# Patient Record
Sex: FEMALE | Race: OTHER | HISPANIC OR LATINO | Employment: FULL TIME | URBAN - METROPOLITAN AREA
[De-identification: names, ages, dates, MRNs, and addresses within clinical notes are randomized per-mention and may not be internally consistent; named-entity substitution may affect disease eponyms.]

---

## 2023-01-11 PROBLEM — J33.9 NASAL POLYP: Status: ACTIVE | Noted: 2023-01-11

## 2023-01-11 PROBLEM — J30.9 ALLERGIC RHINITIS: Status: ACTIVE | Noted: 2023-01-11

## 2023-01-11 PROBLEM — R10.13 DYSPEPSIA: Status: ACTIVE | Noted: 2023-01-11

## 2023-01-25 ENCOUNTER — HOSPITAL ENCOUNTER (OUTPATIENT)
Dept: ULTRASOUND IMAGING | Facility: HOSPITAL | Age: 53
Discharge: HOME | End: 2023-01-25
Attending: FAMILY MEDICINE

## 2023-01-25 DIAGNOSIS — R10.13 DYSPEPSIA: ICD-10-CM

## 2023-02-08 ENCOUNTER — OFFICE VISIT (OUTPATIENT)
Dept: FAMILY MEDICINE CLINIC | Facility: CLINIC | Age: 53
End: 2023-02-08

## 2023-02-08 VITALS
TEMPERATURE: 96.8 F | BODY MASS INDEX: 32.81 KG/M2 | WEIGHT: 192.2 LBS | OXYGEN SATURATION: 97 % | HEIGHT: 64 IN | SYSTOLIC BLOOD PRESSURE: 134 MMHG | DIASTOLIC BLOOD PRESSURE: 92 MMHG | RESPIRATION RATE: 17 BRPM | HEART RATE: 90 BPM

## 2023-02-08 DIAGNOSIS — J34.2 DEVIATED NASAL SEPTUM: ICD-10-CM

## 2023-02-08 DIAGNOSIS — E78.2 MIXED HYPERLIPIDEMIA: ICD-10-CM

## 2023-02-08 DIAGNOSIS — I71.21 ANEURYSM OF ASCENDING AORTA WITHOUT RUPTURE: Primary | ICD-10-CM

## 2023-02-08 DIAGNOSIS — R42 DIZZINESS: ICD-10-CM

## 2023-02-08 DIAGNOSIS — K76.0 HEPATIC STEATOSIS: ICD-10-CM

## 2023-02-08 DIAGNOSIS — I10 ESSENTIAL HYPERTENSION: ICD-10-CM

## 2023-02-08 DIAGNOSIS — R10.13 DYSPEPSIA: ICD-10-CM

## 2023-02-08 DIAGNOSIS — J30.9 ALLERGIC RHINITIS, UNSPECIFIED SEASONALITY, UNSPECIFIED TRIGGER: ICD-10-CM

## 2023-02-08 PROBLEM — J33.9 NASAL POLYP: Status: RESOLVED | Noted: 2023-01-11 | Resolved: 2023-02-08

## 2023-02-08 RX ORDER — DICYCLOMINE HYDROCHLORIDE 10 MG/1
10 CAPSULE ORAL
Qty: 30 CAPSULE | Refills: 1 | Status: SHIPPED | OUTPATIENT
Start: 2023-02-08

## 2023-02-08 RX ORDER — MECLIZINE HYDROCHLORIDE 25 MG/1
25 TABLET ORAL 3 TIMES DAILY PRN
Qty: 30 TABLET | Refills: 3 | Status: SHIPPED | OUTPATIENT
Start: 2023-02-08

## 2023-02-08 RX ORDER — EZETIMIBE 10 MG/1
10 TABLET ORAL DAILY
Qty: 30 TABLET | Refills: 1 | Status: SHIPPED | OUTPATIENT
Start: 2023-02-08

## 2023-02-08 RX ORDER — AMLODIPINE BESYLATE 5 MG/1
5 TABLET ORAL DAILY
Qty: 90 TABLET | Refills: 1 | Status: SHIPPED | OUTPATIENT
Start: 2023-02-08

## 2023-02-08 NOTE — ASSESSMENT & PLAN NOTE
-Epigastric pain shortly after food intake  -Right upper quadrant ultrasound was negative for gallstone etiology  -No significant improvement with Protonix  -Recommend obtaining H  pylori urea breath test(hold PPI 2 weeks prior to testing)  -We will refer to GI for persistent symptoms

## 2023-02-08 NOTE — PROGRESS NOTES
Name: Nelsy Malcolm      : 1970      MRN: [id number]  Encounter Provider: Tim Jang MD  Encounter Date: 2023   Encounter department: [address] [place] [address]     1  Aneurysm of ascending aorta without rupture  Assessment & Plan:  -Stable  -Echo back in  dilated aortic root and proximal ascending aorta measuring 4 1 cm   -Repeat surveillance in 2023  -Continue statin for lipid control      2  Essential hypertension  Assessment & Plan:  Blood pressure suboptimal today at 134/92  Continue amlodipine and metoprolol  Continue Dash diet  Recommend ambulatory blood pressure monitoring    Orders:  -     amLODIPine (NORVASC) 5 mg tablet; Take 1 tablet (5 mg total) by mouth daily    3  Mixed hyperlipidemia  Assessment & Plan:  -Significant fasting LDL of 216 with total cholesterol 290  -Extensive dietary counseling provided to patient  -Continue atorvastatin 80 mg  -We will add Zetia at 10 mg with a fasting LDL goal of less than 70    Orders:  -     ezetimibe (ZETIA) 10 mg tablet; Take 1 tablet (10 mg total) by mouth daily  -     Lipid Panel with Direct LDL reflex; Future; Expected date: 2023    4  Dyspepsia  Assessment & Plan:  -Epigastric pain shortly after food intake  -Right upper quadrant ultrasound was negative for gallstone etiology  -No significant improvement with Protonix  -Recommend obtaining H  pylori urea breath test(hold PPI 2 weeks prior to testing)  -We will refer to GI for persistent symptoms    Orders:  -     dicyclomine (BENTYL) 10 mg capsule; Take 1 capsule (10 mg total) by mouth 4 (four) times a day (before meals and at bedtime)    5  Dizziness  -     meclizine (ANTIVERT) 25 mg tablet; Take 1 tablet (25 mg total) by mouth 3 (three) times a day as needed for dizziness    6  Deviated nasal septum  Assessment & Plan:  Follows with ENT outpatient      7   Allergic rhinitis, unspecified seasonality, unspecified trigger  Assessment & Plan:  Continue intranasal antihistamine   Follow-up with ENT outpatient        8  Hepatic steatosis  Assessment & Plan:  Recent right upper quadrant ultrasound showed hepatic steatosis  Her LFTs are within normal limits  Recommend healthy diet, exercise and weight loss           Subjective      64-year-old female with a past medical history of hyperlipidemia, prediabetes, obesity and ascending aortic aneurysm who presents today for follow-up on her medical conditions  Patient continues to experience epigastric abdominal pain that is worsened with food intake  She states that her symptoms is also associated with bloating  She denies any fever, chills, nausea, vomiting, melena, hematemesis, and hematochezia  She was sent to get H  pylori test but unfortunately the test was not done  She did had recent labs done which was reviewed with her  Her cholesterol is significantly elevated  Patient confirmed that her testing was done under fasting condition  She reports that she has some work to do as far as her diet  She still eats a lot of fried food  Review of Systems   Constitutional: Negative for chills, diaphoresis, fatigue and fever  HENT: Positive for congestion  Negative for ear pain, postnasal drip, rhinorrhea, sinus pain, sneezing, sore throat, trouble swallowing and voice change  Eyes: Negative for visual disturbance  Respiratory: Negative for cough, shortness of breath and wheezing  Cardiovascular: Negative for chest pain, palpitations and leg swelling  Gastrointestinal: Positive for abdominal pain  Negative for abdominal distention, blood in stool, constipation, diarrhea and vomiting  Genitourinary: Negative for dysuria, hematuria and urgency  Musculoskeletal: Negative for arthralgias  Skin: Negative for rash  Neurological: Negative for syncope, weakness, numbness and headaches  Hematological: Negative for adenopathy     Psychiatric/Behavioral: Negative for agitation  Current Outpatient Medications on File Prior to Visit   Medication Sig   • atorvastatin (LIPITOR) 80 mg tablet Take 1 tablet (80 mg total) by mouth daily   • azelastine (ASTELIN) 0 1 % nasal spray 1 spray into each nostril 2 (two) times a day Use in each nostril as directed   • Cholecalciferol (VITAMIN D3 PO) Take by mouth in the morning 4/1/22 pt   • lidocaine (LMX) 4 % cream Apply topically as needed for mild pain   • loratadine (CLARITIN) 10 mg tablet Take 1 tablet (10 mg total) by mouth daily   • metFORMIN (GLUCOPHAGE-XR) 500 mg 24 hr tablet Take 500 mg by mouth daily with breakfast 4/1/22 Pt reports using daily with breakfast     • metoprolol tartrate (LOPRESSOR) 25 mg tablet Take 0 5 tablets (12 5 mg total) by mouth every 12 (twelve) hours   • pantoprazole (PROTONIX) 40 mg tablet Take 1 tablet (40 mg total) by mouth daily   • [DISCONTINUED] amLODIPine (NORVASC) 5 mg tablet Take 1 tablet (5 mg total) by mouth daily   • [DISCONTINUED] meclizine (ANTIVERT) 25 mg tablet Take 1 tablet (25 mg total) by mouth 3 (three) times a day as needed for dizziness       Objective     /92 (BP Location: Left arm, Patient Position: Sitting, Cuff Size: Standard)   Pulse 90   Temp (!) 96 8 °F (36 °C) (Temporal)   Resp 17   Ht 5' 4" (1 626 m)   Wt 87 2 kg (192 lb 3 2 oz)   LMP 12/15/2018 (Approximate) Comment: Hx of tubal ligation  SpO2 97%   BMI 32 99 kg/m²     Physical Exam  Vitals and nursing note reviewed  Constitutional:       General: She is not in acute distress  Appearance: Normal appearance  She is well-developed  She is not ill-appearing, toxic-appearing or diaphoretic  HENT:      Head: Normocephalic and atraumatic  Right Ear: External ear normal       Left Ear: External ear normal       Nose: Nose normal       Mouth/Throat:      Mouth: Mucous membranes are moist       Pharynx: No oropharyngeal exudate or posterior oropharyngeal erythema  Eyes:      General: No scleral icterus  Right eye: No discharge  Left eye: No discharge  Extraocular Movements: Extraocular movements intact  Conjunctiva/sclera: Conjunctivae normal    Neck:      Thyroid: No thyromegaly  Vascular: No JVD  Trachea: No tracheal deviation  Cardiovascular:      Rate and Rhythm: Normal rate and regular rhythm  Pulses:           Dorsalis pedis pulses are 2+ on the right side and 2+ on the left side  Posterior tibial pulses are 2+ on the right side and 2+ on the left side  Heart sounds: Normal heart sounds  No murmur heard  No friction rub  No gallop  Pulmonary:      Effort: Pulmonary effort is normal  No respiratory distress  Breath sounds: Normal breath sounds  No stridor  No wheezing or rhonchi  Abdominal:      General: Bowel sounds are normal  There is no distension  Palpations: Abdomen is soft  There is no mass  Tenderness: There is abdominal tenderness in the epigastric area  There is no guarding or rebound  Hernia: No hernia is present  Musculoskeletal:         General: Normal range of motion  Cervical back: Normal range of motion and neck supple  Right lower leg: No edema  Left lower leg: No edema  Feet:      Right foot:      Skin integrity: No ulcer, skin breakdown, erythema, warmth, callus or dry skin  Left foot:      Skin integrity: No ulcer, skin breakdown, erythema, warmth, callus or dry skin  Skin:     General: Skin is warm  Capillary Refill: Capillary refill takes less than 2 seconds  Findings: No lesion or rash  Neurological:      General: No focal deficit present  Mental Status: She is alert and oriented to person, place, and time  Cranial Nerves: No cranial nerve deficit  Motor: No weakness or abnormal muscle tone        Coordination: Coordination normal       Gait: Gait normal    Psychiatric:         Mood and Affect: Mood normal          Behavior: Behavior normal        Rick Jenae Rueda MD

## 2023-02-08 NOTE — ASSESSMENT & PLAN NOTE
-Stable  -Echo back in 2021 dilated aortic root and proximal ascending aorta measuring 4 1 cm   -Repeat surveillance in 5/2023  -Continue statin for lipid control

## 2023-02-08 NOTE — ASSESSMENT & PLAN NOTE
-Significant fasting LDL of 216 with total cholesterol 290  -Extensive dietary counseling provided to patient  -Continue atorvastatin 80 mg  -We will add Zetia at 10 mg with a fasting LDL goal of less than 70

## 2023-02-08 NOTE — ASSESSMENT & PLAN NOTE
Recent right upper quadrant ultrasound showed hepatic steatosis  Her LFTs are within normal limits  Recommend healthy diet, exercise and weight loss

## 2023-02-09 ENCOUNTER — APPOINTMENT (OUTPATIENT)
Dept: LAB | Facility: HOSPITAL | Age: 53
End: 2023-02-09

## 2023-02-09 DIAGNOSIS — J30.9 ALLERGIC RHINITIS, UNSPECIFIED SEASONALITY, UNSPECIFIED TRIGGER: Primary | ICD-10-CM

## 2023-02-09 DIAGNOSIS — J30.9 SPASMODIC RHINORRHEA: ICD-10-CM

## 2023-02-09 DIAGNOSIS — E11.9 DIABETES MELLITUS WITHOUT COMPLICATION (HCC): ICD-10-CM

## 2023-02-09 LAB
BUN SERPL-MCNC: 16 MG/DL (ref 5–25)
CREAT SERPL-MCNC: 0.96 MG/DL (ref 0.6–1.2)
GFR SERPL CREATININE-BSD FRML MDRD: 68 ML/MIN/1.73SQ M

## 2023-02-12 ENCOUNTER — HOSPITAL ENCOUNTER (EMERGENCY)
Facility: HOSPITAL | Age: 53
Discharge: HOME | End: 2023-02-12
Attending: EMERGENCY MEDICINE

## 2023-02-12 ENCOUNTER — APPOINTMENT (EMERGENCY)
Dept: CT IMAGING | Facility: HOSPITAL | Age: 53
End: 2023-02-12

## 2023-02-12 VITALS
RESPIRATION RATE: 16 BRPM | TEMPERATURE: 98.3 F | DIASTOLIC BLOOD PRESSURE: 82 MMHG | OXYGEN SATURATION: 99 % | BODY MASS INDEX: 32.35 KG/M2 | HEART RATE: 66 BPM | SYSTOLIC BLOOD PRESSURE: 136 MMHG | WEIGHT: 188.49 LBS

## 2023-02-12 DIAGNOSIS — R10.9 ABDOMINAL PAIN: Primary | ICD-10-CM

## 2023-02-12 LAB
ALBUMIN SERPL BCP-MCNC: 4.3 G/DL (ref 3.5–5)
ALP SERPL-CCNC: 121 U/L (ref 34–104)
ALT SERPL W P-5'-P-CCNC: 17 U/L (ref 7–52)
ANION GAP SERPL CALCULATED.3IONS-SCNC: 8 MMOL/L (ref 4–13)
AST SERPL W P-5'-P-CCNC: 15 U/L (ref 13–39)
BASOPHILS # BLD AUTO: 0.06 THOUSANDS/ÂΜL (ref 0–0.1)
BASOPHILS NFR BLD AUTO: 1 % (ref 0–1)
BILIRUB SERPL-MCNC: 0.29 MG/DL (ref 0.2–1)
BILIRUB UR QL STRIP: NEGATIVE
BUN SERPL-MCNC: 15 MG/DL (ref 5–25)
CALCIUM SERPL-MCNC: 9.2 MG/DL (ref 8.4–10.2)
CARDIAC TROPONIN I PNL SERPL HS: 3 NG/L
CHLORIDE SERPL-SCNC: 106 MMOL/L (ref 96–108)
CLARITY UR: CLEAR
CO2 SERPL-SCNC: 27 MMOL/L (ref 21–32)
COLOR UR: YELLOW
CREAT SERPL-MCNC: 0.88 MG/DL (ref 0.6–1.3)
EOSINOPHIL # BLD AUTO: 0.04 THOUSAND/ÂΜL (ref 0–0.61)
EOSINOPHIL NFR BLD AUTO: 1 % (ref 0–6)
ERYTHROCYTE [DISTWIDTH] IN BLOOD BY AUTOMATED COUNT: 13.6 % (ref 11.6–15.1)
GFR SERPL CREATININE-BSD FRML MDRD: 76 ML/MIN/1.73SQ M
GLUCOSE SERPL-MCNC: 106 MG/DL (ref 65–140)
GLUCOSE UR STRIP-MCNC: NEGATIVE MG/DL
HCT VFR BLD AUTO: 39.8 % (ref 34.8–46.1)
HGB BLD-MCNC: 12.5 G/DL (ref 11.5–15.4)
HGB UR QL STRIP.AUTO: NEGATIVE
IMM GRANULOCYTES # BLD AUTO: 0.04 THOUSAND/UL (ref 0–0.2)
IMM GRANULOCYTES NFR BLD AUTO: 1 % (ref 0–2)
KETONES UR STRIP-MCNC: NEGATIVE MG/DL
LEUKOCYTE ESTERASE UR QL STRIP: NEGATIVE
LIPASE SERPL-CCNC: 47 U/L (ref 11–82)
LYMPHOCYTES # BLD AUTO: 1.96 THOUSANDS/ÂΜL (ref 0.6–4.47)
LYMPHOCYTES NFR BLD AUTO: 28 % (ref 14–44)
MCH RBC QN AUTO: 27.9 PG (ref 26.8–34.3)
MCHC RBC AUTO-ENTMCNC: 31.4 G/DL (ref 31.4–37.4)
MCV RBC AUTO: 89 FL (ref 82–98)
MONOCYTES # BLD AUTO: 0.45 THOUSAND/ÂΜL (ref 0.17–1.22)
MONOCYTES NFR BLD AUTO: 6 % (ref 4–12)
NEUTROPHILS # BLD AUTO: 4.43 THOUSANDS/ÂΜL (ref 1.85–7.62)
NEUTS SEG NFR BLD AUTO: 63 % (ref 43–75)
NITRITE UR QL STRIP: NEGATIVE
NRBC BLD AUTO-RTO: 0 /100 WBCS
PH UR STRIP.AUTO: 5.5 [PH] (ref 4.5–8)
PLATELET # BLD AUTO: 286 THOUSANDS/UL (ref 149–390)
PMV BLD AUTO: 9.5 FL (ref 8.9–12.7)
POTASSIUM SERPL-SCNC: 3.5 MMOL/L (ref 3.5–5.3)
PROT SERPL-MCNC: 7.5 G/DL (ref 6.4–8.4)
PROT UR STRIP-MCNC: NEGATIVE MG/DL
RBC # BLD AUTO: 4.48 MILLION/UL (ref 3.81–5.12)
SODIUM SERPL-SCNC: 141 MMOL/L (ref 135–147)
SP GR UR STRIP.AUTO: >=1.03 (ref 1–1.03)
UROBILINOGEN UR QL STRIP.AUTO: 0.2 E.U./DL
WBC # BLD AUTO: 6.98 THOUSAND/UL (ref 4.31–10.16)

## 2023-02-12 RX ORDER — SUCRALFATE 1 G/1
1 TABLET ORAL ONCE
Status: COMPLETED | OUTPATIENT
Start: 2023-02-12 | End: 2023-02-12

## 2023-02-12 RX ORDER — FAMOTIDINE 20 MG/1
20 TABLET, FILM COATED ORAL 2 TIMES DAILY
Qty: 30 TABLET | Refills: 0 | Status: SHIPPED | OUTPATIENT
Start: 2023-02-12

## 2023-02-12 RX ORDER — ONDANSETRON 2 MG/ML
4 INJECTION INTRAMUSCULAR; INTRAVENOUS ONCE
Status: COMPLETED | OUTPATIENT
Start: 2023-02-12 | End: 2023-02-12

## 2023-02-12 RX ORDER — SUCRALFATE 1 G/1
1 TABLET ORAL
Qty: 60 TABLET | Refills: 0 | Status: SHIPPED | OUTPATIENT
Start: 2023-02-12 | End: 2023-02-27

## 2023-02-12 RX ORDER — FAMOTIDINE 20 MG/1
20 TABLET, FILM COATED ORAL ONCE
Status: COMPLETED | OUTPATIENT
Start: 2023-02-12 | End: 2023-02-12

## 2023-02-12 RX ADMIN — SUCRALFATE 1 G: 1 TABLET ORAL at 15:59

## 2023-02-12 RX ADMIN — IOHEXOL 100 ML: 350 INJECTION, SOLUTION INTRAVENOUS at 17:47

## 2023-02-12 RX ADMIN — ONDANSETRON 4 MG: 2 INJECTION INTRAMUSCULAR; INTRAVENOUS at 16:18

## 2023-02-12 RX ADMIN — FAMOTIDINE 20 MG: 20 TABLET ORAL at 15:59

## 2023-02-12 RX ADMIN — IOHEXOL 18 ML: 300 INJECTION, SOLUTION INTRAVENOUS at 17:47

## 2023-02-12 RX ADMIN — SODIUM CHLORIDE 1000 ML: 0.9 INJECTION, SOLUTION INTRAVENOUS at 16:16

## 2023-02-12 NOTE — DISCHARGE INSTRUCTIONS
Stop taking Protonix (medication prescribed by your family physician)  Start taking Pepcid twice a day and Carafate four times a day (30 minutes before meals and bedtime)  Prescriptions have been provided  Follow up with GI as you will likely need an EGD for further evaluation

## 2023-02-12 NOTE — ED PROVIDER NOTES
History  Chief Complaint   Patient presents with   • Abdominal Pain     Pt c/o generalized abd  Pain for the past x 1 week  Pt states pain increases after she eats  Pt denies cp/sob/n/v/d or fevers     A 78-year-old female with past medical history of ascending aortic aneurysm, breast cancer s/p radiation, diabetes, hyperlipidemia, hypertension and s/p gastric sleeve (2018); presents with mid epigastric abdominal pain that has gotten progressively worse for the past 2-3 weeks  Patient states pain occurs after eating, will last for a few hours before resolving  Pain is associated with abdominal distention and bloating  Patient reports nausea but denies vomiting  She has been having regular, nonbloody bowel movements  She also denies melena  Patient otherwise denies fever, chills, chest pain, shortness of breath, dysuria, peripheral edema and rashes  Patient was evaluated by her PCP for the symptoms, undergoing a right upper quadrant ultrasound (see results below) and started on Protonix however patient has not gained relief with the medication  She was recently referred to GI for further evaluation  Assessment and plan: Epigastric abdominal pain, postprandial in nature  Patient failing outpatient treatment with Protonix  Concern for gastritis vs PUD  We will check lab work for anemia, SUKHJINDER and electrolyte abnormality  Due to location of symptoms and comorbid conditions, will obtain one time troponin and EKG  Given prior bariatric surgery, will obtain CT scan for further evaluation of intra-abdominal pathology  Will treat symptomatically  History provided by:  Patient and medical records    RU US (1/25/23)  1 ) Hepatic steatosis  2 ) Remainder of the examination is normal     Prior to Admission Medications   Prescriptions Last Dose Informant Patient Reported? Taking?    Cholecalciferol (VITAMIN D3 PO)   Yes Yes   Sig: Take by mouth in the morning 4/1/22 pt   amLODIPine (NORVASC) 5 mg tablet   No Yes   Sig: Take 1 tablet (5 mg total) by mouth daily   atorvastatin (LIPITOR) 80 mg tablet   No No   Sig: Take 1 tablet (80 mg total) by mouth daily   azelastine (ASTELIN) 0 1 % nasal spray   No Yes   Si spray into each nostril 2 (two) times a day Use in each nostril as directed   dicyclomine (BENTYL) 10 mg capsule   No Yes   Sig: Take 1 capsule (10 mg total) by mouth 4 (four) times a day (before meals and at bedtime)   ezetimibe (ZETIA) 10 mg tablet   No Yes   Sig: Take 1 tablet (10 mg total) by mouth daily   lidocaine (LMX) 4 % cream   No Yes   Sig: Apply topically as needed for mild pain   loratadine (CLARITIN) 10 mg tablet More than a month  No No   Sig: Take 1 tablet (10 mg total) by mouth daily   meclizine (ANTIVERT) 25 mg tablet Past Month  No Yes   Sig: Take 1 tablet (25 mg total) by mouth 3 (three) times a day as needed for dizziness   metFORMIN (GLUCOPHAGE-XR) 500 mg 24 hr tablet   Yes Yes   Sig: Take 500 mg by mouth daily with breakfast 22 Pt reports using daily with breakfast     metoprolol tartrate (LOPRESSOR) 25 mg tablet   No Yes   Sig: Take 0 5 tablets (12 5 mg total) by mouth every 12 (twelve) hours   pantoprazole (PROTONIX) 40 mg tablet   No Yes   Sig: Take 1 tablet (40 mg total) by mouth daily      Facility-Administered Medications: None       Past Medical History:   Diagnosis Date   • Anemia     22 Pt reports hx of anemia - no current issues at this time    • Anesthesia     22 Pt reports her mother had reaction to anesthesia - reports her mother "didnt wake up for two weeks"    • Aortic aneurysm ([facility] [address] )    • Breast cancer ([facility] [address] ) 2017    left side   • Depression     22 Hx of depression - resolved per pt at this time    • Diabetes mellitus ([facility] [address] )    • History of chemotherapy 2017    chest radiation   • History of COVID-19     22 Pt reports COVID in    • Hyperlipidemia    • Hypertension    • Left wrist pain 2019   • Migraine    • Obesity     Post medical management with Phentermine/topiramate    • Prediabetes    • Sleep apnea     Hx of sleep apnea - using CPAP in the past but since gastric surgery no further issues    • Tendinitis     4/1/22 Pt reports right hand tendinitis        Past Surgical History:   Procedure Laterality Date   • ABDOMINOPLASTY N/A 4/6/2022    Procedure: ABDOMINOPLASTY;  Surgeon: Josh Davenport MD;  Location: [address] [place] [address];  Service: Plastics   • BREAST BIOPSY Left 12/06/2016    stereo   • BREAST LUMPECTOMY W/ NEEDLE LOCALIZATION Left 01/20/2017    RADIATION,HORMONE THERAPY  (DCIS LEFT BREAST)   • COLONOSCOPY     • GASTRECTOMY SLEEVE LAPAROSCOPIC     • ND EXCISION SKIN ABD INFRAUMBILICAL PANNICULECTOMY N/A 4/6/2022    Procedure: LIPECTOMY;  Surgeon: Josh Davenport MD;  Location: [address] [place] [address];  Service: Plastics   • ND MASTOPEXY Bilateral 10/6/2022    Procedure: MASTOPEXY BREAST;  Surgeon: Josh Davenport MD;  Location: [address] [place] [address];  Service: Plastics   • TUBAL LIGATION         Family History   Problem Relation Age of Onset   • Hyperlipidemia Mother    • Diabetes Mother    • Hypertension Mother    • Colon cancer Father    • Liver cancer Father    • Hypertension Sister    • Hypertension Brother    • No Known Problems Daughter    • No Known Problems Maternal Grandmother    • No Known Problems Maternal Grandfather    • Skin cancer Paternal Grandmother    • Lymphoma Paternal Grandfather      I have reviewed and agree with the history as documented      E-Cigarette/Vaping   • E-Cigarette Use Never User    • Comments Denies any former or current use       E-Cigarette/Vaping Substances     Social History     Tobacco Use   • Smoking status: Never   • Smokeless tobacco: Never   • Tobacco comments:     Denies former or current use   Vaping Use   • Vaping Use: Never used   Substance Use Topics   • Alcohol use: Not Currently     Comment: Pt reports former rare wine use - no current use at this time    • Drug use: Never     Comment: Denies any former or current use Review of Systems   Gastrointestinal: Positive for abdominal distention, abdominal pain and nausea  All other systems reviewed and are negative  Physical Exam  Physical Exam  General Appearance: alert and oriented, nad, non toxic appearing  Skin:  Warm, dry, intact  No cyanosis  HEENT: Atraumatic, normocephalic  No eye drainage  Normal hearing  Moist mucous membranes  Neck: Supple, trachea midline  Cardiac: RRR; no murmurs, rub, gallops  No pedal edema, 2+ pulses  Pulmonary: lungs CTAB; no wheezes, rales, rhonchi  Gastrointestinal: abdomen soft, epigastric to mid abdominal tenderness, nondistended; no guarding or rebound tenderness; good bowel sounds, no mass or bruits  Extremities:  No deformities    No calf tenderness, no clubbing  Neuro:  no focal motor or sensory deficits, CN 2-12 grossly intact  Psych:  Normal mood and affect, normal judgement and insight      Vital Signs  ED Triage Vitals [02/12/23 1417]   Temperature Pulse Respirations Blood Pressure SpO2   98 3 °F (36 8 °C) 93 18 146/68 100 %      Temp Source Heart Rate Source Patient Position - Orthostatic VS BP Location FiO2 (%)   Oral Monitor Sitting Right arm --      Pain Score       8           Vitals:    02/12/23 1417 02/12/23 1624 02/12/23 1836   BP: 146/68 131/83 136/82   Pulse: 93 77 66   Patient Position - Orthostatic VS: Sitting  Lying         Visual Acuity      ED Medications  Medications   sodium chloride 0 9 % bolus 1,000 mL (0 mL Intravenous Stopped 2/12/23 1837)   ondansetron (ZOFRAN) injection 4 mg (4 mg Intravenous Given 2/12/23 1618)   famotidine (PEPCID) tablet 20 mg (20 mg Oral Given 2/12/23 1559)   sucralfate (CARAFATE) tablet 1 g (1 g Oral Given 2/12/23 1559)   iohexol (OMNIPAQUE) 350 MG/ML injection (SINGLE-DOSE) 100 mL (100 mL Intravenous Given 2/12/23 1747)   iohexol (OMNIPAQUE) 300 mg/mL injection 18 mL (18 mL Oral Given 2/12/23 1747)       Diagnostic Studies  Results Reviewed     Procedure Component Value Units Date/Time    HS Troponin 0hr (reflex protocol) [[id number]]  (Normal) Collected: 02/12/23 1615    Lab Status: Final result Specimen: Blood from Arm, Right Updated: 02/12/23 1651     hs TnI 0hr 3 ng/L     Comprehensive metabolic panel [[id number]]  (Abnormal) Collected: 02/12/23 1615    Lab Status: Final result Specimen: Blood from Arm, Right Updated: 02/12/23 1649     Sodium 141 mmol/L      Potassium 3 5 mmol/L      Chloride 106 mmol/L      CO2 27 mmol/L      ANION GAP 8 mmol/L      BUN 15 mg/dL      Creatinine 0 88 mg/dL      Glucose 106 mg/dL      Calcium 9 2 mg/dL      AST 15 U/L      ALT 17 U/L      Alkaline Phosphatase 121 U/L      Total Protein 7 5 g/dL      Albumin 4 3 g/dL      Total Bilirubin 0 29 mg/dL      eGFR 76 ml/min/1 73sq m     Narrative:      [place] guidelines for Chronic Kidney Disease (CKD):   •  Stage 1 with normal or high GFR (GFR > 90 mL/min/1 73 square meters)  •  Stage 2 Mild CKD (GFR = 60-89 mL/min/1 73 square meters)  •  Stage 3A Moderate CKD (GFR = 45-59 mL/min/1 73 square meters)  •  Stage 3B Moderate CKD (GFR = 30-44 mL/min/1 73 square meters)  •  Stage 4 Severe CKD (GFR = 15-29 mL/min/1 73 square meters)  •  Stage 5 End Stage CKD (GFR <15 mL/min/1 73 square meters)  Note: GFR calculation is accurate only with a steady state creatinine    Lipase [[id number]]  (Normal) Collected: 02/12/23 1615    Lab Status: Final result Specimen: Blood from Arm, Right Updated: 02/12/23 1649     Lipase 47 u/L     CBC and differential [[id number]] Collected: 02/12/23 1615    Lab Status: Final result Specimen: Blood from Arm, Right Updated: 02/12/23 1622     WBC 6 98 Thousand/uL      RBC 4 48 Million/uL      Hemoglobin 12 5 g/dL      Hematocrit 39 8 %      MCV 89 fL      MCH 27 9 pg      MCHC 31 4 g/dL      RDW 13 6 %      MPV 9 5 fL      Platelets 697 Thousands/uL      nRBC 0 /100 WBCs      Neutrophils Relative 63 %      Immat GRANS % 1 %      Lymphocytes Relative 28 % Monocytes Relative 6 %      Eosinophils Relative 1 %      Basophils Relative 1 %      Neutrophils Absolute 4 43 Thousands/µL      Immature Grans Absolute 0 04 Thousand/uL      Lymphocytes Absolute 1 96 Thousands/µL      Monocytes Absolute 0 45 Thousand/µL      Eosinophils Absolute 0 04 Thousand/µL      Basophils Absolute 0 06 Thousands/µL     Urine Macroscopic, POC [[id number]] Collected: 02/12/23 1604    Lab Status: Final result Specimen: Urine Updated: 02/12/23 1606     Color, UA Yellow     Clarity, UA Clear     pH, UA 5 5     Leukocytes, UA Negative     Nitrite, UA Negative     Protein, UA Negative mg/dl      Glucose, UA Negative mg/dl      Ketones, UA Negative mg/dl      Urobilinogen, UA 0 2 E U /dl      Bilirubin, UA Negative     Occult Blood, UA Negative     Specific Gravity, UA >=1 030    Narrative:      CLINITEK RESULT                 CT abdomen pelvis with contrast   Final Result by Alex ePtersen MD (02/12 1807)      No acute abdominal or pelvic pathology  Status post sleeve gastrectomy with no evidence of bowel obstruction or inflammation  The gastric sleeve is not well distended, however otherwise within normal limits  Normal appendix  Additional findings as above  Workstation performed: [id number]                    Procedures  Procedures   ECG 12 Lead Documentation  Date/Time: today/date: 2/13/2023  Performed by: Vanna Estrada    ECG reviewed by me, the ED Provider: yes    Patient location:  ED   Previous ECG:  Compared to current, no change   Rate:  77  ECG rate assessment: normal    Rhythm: sinus rhythm    Ectopy:  none    QRS axis:  Normal  Intervals: normal   Q waves: None   ST segments:  Nonspecific changes  T waves: nonspecific changes      Impression: NSR with nonspecific ST and T wave changes          ED Course  ED Course as of 02/13/23 2049   Sun Feb 12, 2023   1700 Labs WNL   1728 Pt feeling better following pepcid and carafate  Updated on lab results    Pending CTAP    1825 CT abdomen pelvis with contrast  1 ) No acute abdominal or pelvic pathology  2 ) Status post sleeve gastrectomy with no evidence of bowel obstruction or inflammation  The gastric sleeve is not well distended, however otherwise within normal limits  Normal appendix  65 Work up within normal limits  Suspect underlying gastritis/PUD  Will discharge with pepcid and carafate, GI follow up for likely EGD             HEART Risk Score    Flowsheet Row Most Recent Value   Heart Score Risk Calculator    History 0 Filed at: 02/12/2023 1653   ECG 1 Filed at: 02/12/2023 1653   Age 1 Filed at: 02/12/2023 1653   Risk Factors 1 Filed at: 02/12/2023 1653   Troponin 0 Filed at: 02/12/2023 1653   HEART Score 3 Filed at: 02/12/2023 1653                        SBIRT 22yo+    Flowsheet Row Most Recent Value   SBIRT (23 yo +)    In order to provide better care to our patients, we are screening all of our patients for alcohol and drug use  Would it be okay to ask you these screening questions? No Filed at: 02/12/2023 1512                    MDM    Disposition  Final diagnoses:   Abdominal pain     Time reflects when diagnosis was documented in both MDM as applicable and the Disposition within this note     Time User Action Codes Description Comment    2/12/2023  6:26 PM Quirino Lennox Add [R10 9] Abdominal pain       ED Disposition     ED Disposition   Discharge    Condition   Stable    Date/Time   Sun Feb 12, 2023  6:26 PM    [facility] [place] discharge to home/self care                 Follow-up Information     Follow up With Specialties Details Why Contact Info Additional [facility] Schedule an appointment as soon as possible for a visit  For re-evaluation [address]  [address] [facility] [address]  Chuck Falk [facility] [place], [address], [address], [facility], [place], [address]   [phone]          Discharge Medication List as of 2/12/2023  6:30 PM      START taking these medications    Details   famotidine (PEPCID) 20 mg tablet Take 1 tablet (20 mg total) by mouth 2 (two) times a day, Starting Sun 2/12/2023, Print      sucralfate (CARAFATE) 1 g tablet Take 1 tablet (1 g total) by mouth 4 (four) times a day (before meals and at bedtime) for 15 days, Starting Sun 2/12/2023, Until Mon 2/27/2023, Print         CONTINUE these medications which have NOT CHANGED    Details   amLODIPine (NORVASC) 5 mg tablet Take 1 tablet (5 mg total) by mouth daily, Starting Wed 2/8/2023, Normal      azelastine (ASTELIN) 0 1 % nasal spray 1 spray into each nostril 2 (two) times a day Use in each nostril as directed, Starting Wed 1/11/2023, Normal      Cholecalciferol (VITAMIN D3 PO) Take by mouth in the morning 4/1/22 pt, Historical Med      dicyclomine (BENTYL) 10 mg capsule Take 1 capsule (10 mg total) by mouth 4 (four) times a day (before meals and at bedtime), Starting Wed 2/8/2023, Normal      ezetimibe (ZETIA) 10 mg tablet Take 1 tablet (10 mg total) by mouth daily, Starting Wed 2/8/2023, Normal      lidocaine (LMX) 4 % cream Apply topically as needed for mild pain, Starting Sun 1/2/2022, Normal      meclizine (ANTIVERT) 25 mg tablet Take 1 tablet (25 mg total) by mouth 3 (three) times a day as needed for dizziness, Starting Wed 2/8/2023, Normal      metFORMIN (GLUCOPHAGE-XR) 500 mg 24 hr tablet Take 500 mg by mouth daily with breakfast 4/1/22 Pt reports using daily with breakfast  , Starting u 6/10/2021, Historical Med      metoprolol tartrate (LOPRESSOR) 25 mg tablet Take 0 5 tablets (12 5 mg total) by mouth every 12 (twelve) hours, Starting Mon 10/24/2022, Normal      atorvastatin (LIPITOR) 80 mg tablet Take 1 tablet (80 mg total) by mouth daily, Starting Mon 10/10/2022, Until Sun 1/8/2023, Normal      loratadine (CLARITIN) 10 mg tablet Take 1 tablet (10 mg total) by mouth daily, Starting Wed 1/11/2023, Normal         STOP taking these medications       pantoprazole (PROTONIX) 40 mg tablet Comments:   Reason for Stopping:                   PDMP Review     None          ED Provider  Electronically Signed by           Vito Dee DO  02/13/23 2049

## 2023-02-12 NOTE — Clinical Note
Juju Ino was seen and treated in our emergency department on 2/12/2023  Diagnosis:     Mihaela Knott  may return to work on return date  She may return on this date: 02/14/2023         If you have any questions or concerns, please don't hesitate to call        Torin    ______________________________           _______________          _______________  Hospital Representative                              Date                                Time

## 2023-02-13 LAB
ATRIAL RATE: 77 BPM
P AXIS: 54 DEGREES
PR INTERVAL: 162 MS
QRS AXIS: 44 DEGREES
QRSD INTERVAL: 88 MS
QT INTERVAL: 404 MS
QTC INTERVAL: 457 MS
T WAVE AXIS: 44 DEGREES
VENTRICULAR RATE: 77 BPM

## 2023-02-14 LAB
A ALTERNATA IGE QN: <0.1 KUA/I
A FUMIGATUS IGE QN: <0.1 KUA/I
BERMUDA GRASS IGE QN: <0.1 KUA/I
BOXELDER IGE QN: <0.1 KUA/I
C HERBARUM IGE QN: <0.1 KUA/I
CAT DANDER IGE QN: <0.1 KUA/I
CMN PIGWEED IGE QN: <0.1 KUA/I
COMMON RAGWEED IGE QN: <0.1 KUA/I
COTTONWOOD IGE QN: <0.1 KUA/I
D FARINAE IGE QN: 0.31 KUA/I
D PTERONYSS IGE QN: 0.4 KUA/I
DOG DANDER IGE QN: <0.1 KUA/I
LONDON PLANE IGE QN: <0.1 KUA/I
MOUSE URINE PROT IGE QN: <0.1 KUA/I
MT JUNIPER IGE QN: <0.1 KUA/I
MUGWORT IGE QN: <0.1 KUA/I
P NOTATUM IGE QN: <0.1 KUA/I
ROACH IGE QN: 0.11 KUA/I
SHEEP SORREL IGE QN: <0.1 KUA/I
SILVER BIRCH IGE QN: <0.1 KUA/I
TIMOTHY IGE QN: <0.1 KUA/I
TOTAL IGE SMQN RAST: 87.1 KU/L (ref 0–113)
WALNUT IGE QN: <0.1 KUA/I
WHITE ASH IGE QN: <0.1 KUA/I
WHITE ELM IGE QN: <0.1 KUA/I
WHITE MULBERRY IGE QN: <0.1 KUA/I
WHITE OAK IGE QN: <0.1 KUA/I

## 2023-04-19 NOTE — H&P (VIEW-ONLY)
[facility] - Outpatient Consultation  Jitendra Parrish 47 y o  female MRN: [id number]  Encounter: [id number]          ASSESSMENT AND PLAN:      1  Abdominal pain  2  Periumbilical abdominal pain  3  Bilious vomiting with nausea  - omeprazole (PriLOSEC) 40 MG capsule; Take 1 capsule (40 mg total) by mouth daily  Dispense: 30 capsule; Refill: 5  Trial of PPI for symptom relief  EGD to evaluate for PUD, rule out h  Pylori infection      - EGD; Future  - omeprazole (PriLOSEC) 40 MG capsule; Take 1 capsule (40 mg total) by mouth daily  Dispense: 30 capsule; Refill: 5      4  Aneurysm of ascending aorta without rupture (HCC)  She was noticed to have AAA increasing in size to 4 7 cm  Refer to vascular  - Ambulatory Referral to Vascular Surgery; Future    ______________________________________________________________________    HPI: 56-year-old female referred by emergency room for evaluation of abdominal pain  Patient reported she has periumbilical pain in the past 1 months  Pain usually starts after she eats  She cannot associate specific food with her abdominal pain  Pain usually last for half an hour and will dissipate  Nothing will make the pain feel better  She also has nausea vomiting when she has the severe abdominal pain  She reported overall regular formed bowel movement  When her pain is severe she has diarrhea but usually resolves on its own  She went to the emergency room with findings of normal blood counts and normal liver and kidney function in the emergency room  On the CAT scan she was found to have nonobstructing urethral stones and an increasing size ascending aorta aneurysm  She denies family history of stomach cancer  She denies overall weight loss  Her abdominal ultrasound in January 2023 showed no gallstone  She has fatty liver  She denies smoking or alcohol abuse        REVIEW OF SYSTEMS:    CONSTITUTIONAL: Denies any fever, chills, rigors, and "weight loss  HEENT: No earache or tinnitus  Denies hearing loss or visual disturbances  CARDIOVASCULAR: No chest pain or palpitations  RESPIRATORY: Denies any cough, hemoptysis, shortness of breath or dyspnea on exertion  GASTROINTESTINAL: As noted in the History of Present Illness  GENITOURINARY: No problems with urination  Denies any hematuria or dysuria  NEUROLOGIC: No dizziness or vertigo, denies headaches  MUSCULOSKELETAL: Denies any muscle or joint pain  SKIN: Denies skin rashes or itching  ENDOCRINE: Denies excessive thirst  Denies intolerance to heat or cold  PSYCHOSOCIAL: Denies depression or anxiety  Denies any recent memory loss         Historical Information   Past Medical History:   Diagnosis Date   • Anemia     4/1/22 Pt reports hx of anemia - no current issues at this time    • Anesthesia     4/1/22 Pt reports her mother had reaction to anesthesia - reports her mother \"didnt wake up for two weeks\"    • Aortic aneurysm [facility])    • Breast cancer ([facility] [address] ) 01/20/2017    left side   • Depression     4/1/22 Hx of depression - resolved per pt at this time    • [facility]    • History of chemotherapy 2017    chest radiation   • History of COVID-19     4/1/22 Pt reports COVID in 2021   • Hyperlipidemia    • Hypertension    • Left wrist pain 7/22/2019   • Migraine    • Obesity     Post medical management with Phentermine/topiramate    • Prediabetes    • Sleep apnea     Hx of sleep apnea - using CPAP in the past but since gastric surgery no further issues    • Tendinitis     4/1/22 Pt reports right hand tendinitis      Past Surgical History:   Procedure Laterality Date   • ABDOMINOPLASTY N/A 4/6/2022    Procedure: ABDOMINOPLASTY;  Surgeon: Angélica Soto MD;  Location: [address] [place] [address];  Service: Plastics   • BREAST BIOPSY Left 12/06/2016    stereo   • BREAST LUMPECTOMY W/ NEEDLE LOCALIZATION Left 01/20/2017    RADIATION,HORMONE THERAPY  (DCIS LEFT BREAST)   • COLONOSCOPY     • GASTRECTOMY " SLEEVE LAPAROSCOPIC     • AK EXCISION SKIN ABD INFRAUMBILICAL PANNICULECTOMY N/A 4/6/2022    Procedure: LIPECTOMY;  Surgeon: Lady Herve MD;  Location: [address] [place] [address] OR;  Service: Plastics   • AK MASTOPEXY Bilateral 10/6/2022    Procedure: MASTOPEXY BREAST;  Surgeon: Lady Herve MD;  Location: [address] [place] [address] OR;  Service: Plastics   • TUBAL LIGATION       Social History   Social History     Substance and Sexual Activity   Alcohol Use Not Currently    Comment: Pt reports former rare wine use - no current use at this time      Social History     Substance and Sexual Activity   Drug Use Never    Comment: Denies any former or current use      Social History     Tobacco Use   Smoking Status Never   Smokeless Tobacco Never   Tobacco Comments    Denies former or current use     Family History   Problem Relation Age of Onset   • Hyperlipidemia Mother    • Diabetes Mother    • Hypertension Mother    • Colon cancer Father    • Liver cancer Father    • Hypertension Sister    • Hypertension Brother    • No Known Problems Daughter    • No Known Problems Maternal Grandmother    • No Known Problems Maternal Grandfather    • Skin cancer Paternal Grandmother    • Lymphoma Paternal Grandfather        Meds/Allergies       Current Outpatient Medications:   •  acetaminophen (TYLENOL) 500 mg tablet  •  amLODIPine (NORVASC) 5 mg tablet  •  azelastine (ASTELIN) 0 1 % nasal spray  •  buPROPion (WELLBUTRIN XL) 150 mg 24 hr tablet  •  Cholecalciferol (VITAMIN D3 PO)  •  dicyclomine (BENTYL) 10 mg capsule  •  ezetimibe (ZETIA) 10 mg tablet  •  famotidine (PEPCID) 20 mg tablet  •  ibuprofen (MOTRIN) 600 mg tablet  •  lidocaine (LMX) 4 % cream  •  loratadine (CLARITIN) 10 mg tablet  •  meclizine (ANTIVERT) 25 mg tablet  •  metFORMIN (GLUCOPHAGE-XR) 500 mg 24 hr tablet  •  metoprolol tartrate (LOPRESSOR) 25 mg tablet  •  omeprazole (PriLOSEC) 40 MG capsule  •  ondansetron (ZOFRAN-ODT) 4 mg disintegrating tablet  •  oxyCODONE (Roxicodone) 5 immediate "release tablet  •  atorvastatin (LIPITOR) 80 mg tablet  •  methylPREDNISolone 4 MG tablet therapy pack  •  sucralfate (CARAFATE) 1 g tablet  •  tamsulosin (FLOMAX) 0 4 mg  •  topiramate (TOPAMAX) 50 MG tablet    No Known Allergies        Objective     Blood pressure 124/70, pulse 96, height 5' 4\" (1 626 m), weight 85 5 kg (188 lb 6 4 oz), last menstrual period 12/15/2018, SpO2 99 %, not currently breastfeeding  Body mass index is 32 34 kg/m²  PHYSICAL EXAM:      General Appearance:   Alert, cooperative, no distress   HEENT:   Normocephalic, atraumatic, anicteric      Neck:  Supple, symmetrical, trachea midline   Lungs:   Clear to auscultation bilaterally; no rales, rhonchi or wheezing; respirations unlabored    Heart[de-identified]   Regular rate and rhythm; no murmur, rub, or gallop  Abdomen:   Soft, non-tender, non-distended; normal bowel sounds; no masses, no organomegaly    Genitalia:   Deferred    Rectal:   Deferred    Extremities:  No cyanosis, clubbing or edema    Pulses:  2+ and symmetric    Skin:  No jaundice, rashes, or lesions    Lymph nodes:  No palpable cervical lymphadenopathy        Lab Results:   No visits with results within 1 Day(s) from this visit     Latest known visit with results is:   Admission on 04/14/2023, Discharged on 04/14/2023   Component Date Value   • WBC 04/14/2023 6 27    • RBC 04/14/2023 4 67    • Hemoglobin 04/14/2023 13 2    • Hematocrit 04/14/2023 40 7    • MCV 04/14/2023 87    • MCH 04/14/2023 28 3    • MCHC 04/14/2023 32 4    • RDW 04/14/2023 13 2    • MPV 04/14/2023 9 9    • Platelets 61/14/2802 353    • nRBC 04/14/2023 0    • Neutrophils Relative 04/14/2023 43    • Immat GRANS % 04/14/2023 1    • Lymphocytes Relative 04/14/2023 47 (H)    • Monocytes Relative 04/14/2023 7    • Eosinophils Relative 04/14/2023 1    • Basophils Relative 04/14/2023 1    • Neutrophils Absolute 04/14/2023 2 67    • Immature Grans Absolute 04/14/2023 0 05    • Lymphocytes Absolute 04/14/2023 2 95    • " Monocytes Absolute 04/14/2023 0 46    • Eosinophils Absolute 04/14/2023 0 09    • Basophils Absolute 04/14/2023 0 05    • Sodium 04/14/2023 138    • Potassium 04/14/2023 3 4 (L)    • Chloride 04/14/2023 103    • CO2 04/14/2023 24    • ANION GAP 04/14/2023 11    • BUN 04/14/2023 14    • Creatinine 04/14/2023 0 97    • Glucose 04/14/2023 118    • Calcium 04/14/2023 10 0    • AST 04/14/2023 22    • ALT 04/14/2023 24    • Alkaline Phosphatase 04/14/2023 123 (H)    • Total Protein 04/14/2023 8 1    • Albumin 04/14/2023 4 6    • Total Bilirubin 04/14/2023 0 49    • eGFR 04/14/2023 67    • Color, UA 04/14/2023 Yellow    • Clarity, UA 04/14/2023 Clear    • pH, UA 04/14/2023 5 0    • Leukocytes, UA 04/14/2023 Trace (A)    • Nitrite, UA 04/14/2023 Negative    • Protein, UA 04/14/2023 Negative    • Glucose, UA 04/14/2023 Negative    • Ketones, UA 04/14/2023 Negative    • Urobilinogen, UA 04/14/2023 0 2    • Bilirubin, UA 04/14/2023 Negative    • Occult Blood, UA 04/14/2023 Large (A)    • Specific Gravity, UA 04/14/2023 >=1 030    • RBC, UA 04/14/2023 20-30 (A)    • WBC, UA 04/14/2023 2-4    • Epithelial Cells 04/14/2023 Occasional    • Bacteria, UA 04/14/2023 Occasional    • hs TnI 0hr 04/14/2023 3    • Ventricular Rate 04/14/2023 59    • Atrial Rate 04/14/2023 59    • AL Interval 04/14/2023 162    • QRSD Interval 04/14/2023 88    • QT Interval 04/14/2023 466    • QTC Interval 04/14/2023 461    • P Axis 04/14/2023 67    • QRS Axis 04/14/2023 53    • T Wave Axis 04/14/2023 66    • hs TnI 2hr 04/14/2023 3    • Delta 2hr hsTnI 04/14/2023 0    • Ventricular Rate 04/14/2023 89    • Atrial Rate 04/14/2023 89    • AL Interval 04/14/2023 164    • QRSD Interval 04/14/2023 84    • QT Interval 04/14/2023 352    • QTC Interval 04/14/2023 428    • P Axis 04/14/2023 62    • QRS Axis 04/14/2023 52    • T Wave Axis 04/14/2023 57          Radiology Results:   CT chest abdomen pelvis w contrast    Result Date: 4/14/2023  Narrative: CT CHEST, ABDOMEN AND PELVIS WITH IV CONTRAST INDICATION: Right flank pain, history of aortic aneurysm  COMPARISON:  CT abdomen pelvis 2/12/2023, CT chest 5/21/2022 TECHNIQUE: CT examination of the chest, abdomen and pelvis was performed  Multiplanar 2D reformatted images were created from the source data  Please note the study was not ordered as a dissection study, therefore noncontrast imaging was not performed  Radiation dose length product (DLP) for this visit:  653 mGy-cm  This examination, like all CT scans performed in the [facility], was performed utilizing techniques to minimize radiation dose exposure, including the use of iterative reconstruction and automated exposure control  IV Contrast:  100 mL of iohexol (OMNIPAQUE) Enteric Contrast: Enteric contrast was not administered  FINDINGS: CHEST LUNGS:  4 mm nodular density seen along the undersurface right minor fissure is unchanged, likely benign intrapulmonary lymph node  Calcified left lower lobe granuloma  Minor dependent hypoventilatory changes without confluent infiltrates  No central endobronchial lesions  PLEURA:  Unremarkable  HEART/GREAT VESSELS: 4 7 cm ascending aortic aneurysm  No evidence of dissection  The mid to distal aortic arch and descending thoracic aorta are normal caliber  MEDIASTINUM AND JUAN A:  Unremarkable  Small calcified lymph node left hilum  CHEST WALL AND LOWER NECK:  Unremarkable  ABDOMEN LIVER/BILIARY TREE:  Liver is diffusely decreased in density consistent with fatty change  No CT evidence of suspicious hepatic mass  Normal hepatic contours  No biliary dilatation  GALLBLADDER:  No calcified gallstones  No pericholecystic inflammatory change  SPLEEN:  Unremarkable  PANCREAS:  Unremarkable  ADRENAL GLANDS:  Unremarkable  KIDNEYS/URETERS:  3 mm right ureteral calculus at the UVJ causing mild hydroureteronephrosis  Mild right perinephric edema, may be reactive  No patchy enhancement to suggest pyelonephritis  The left kidney is unremarkable  STOMACH AND BOWEL:  Sleeve gastrectomy  The small bowel is normal caliber  No focal inflammatory changes or fluid collections are identified  APPENDIX:  No findings to suggest appendicitis  ABDOMINOPELVIC CAVITY:  No ascites  No pneumoperitoneum  No lymphadenopathy  VESSELS:  Atherosclerotic changes abdominal aorta which is normal caliber  PELVIS REPRODUCTIVE ORGANS:  Unremarkable for patient's age  URINARY BLADDER:  Unremarkable  ABDOMINAL WALL/INGUINAL REGIONS:  Ventral anterior pelvic wall scarring  OSSEOUS STRUCTURES:  No acute fracture or destructive osseous lesion  Mild multiple degenerative changes of the spine  Grade 1 spondylolisthesis L4 on L5 secondary to facet arthropathy  Impression: 1  Mild right hydroureteronephrosis secondary to a 3 mm calculus at the UVJ  2  4 7 cm ascending aortic aneurysm, increased from 4 4 cm previously  No evidence of dissection  Management recommendation is for nonemergent cardiothoracic surgery consultation  3  Fatty liver   Workstation performed: [id number]

## 2023-04-21 RX ORDER — SODIUM CHLORIDE 9 MG/ML
125 INJECTION, SOLUTION INTRAVENOUS CONTINUOUS
Status: CANCELLED | OUTPATIENT
Start: 2023-04-21

## 2023-04-24 ENCOUNTER — HOSPITAL ENCOUNTER (OUTPATIENT)
Dept: GASTROENTEROLOGY | Facility: MEDICAL CENTER | Age: 53
Setting detail: OUTPATIENT SURGERY
Discharge: HOME | End: 2023-04-24
Attending: INTERNAL MEDICINE

## 2023-04-24 ENCOUNTER — ANESTHESIA EVENT (OUTPATIENT)
Dept: GASTROENTEROLOGY | Facility: MEDICAL CENTER | Age: 53
End: 2023-04-24

## 2023-04-24 ENCOUNTER — ANESTHESIA (OUTPATIENT)
Dept: GASTROENTEROLOGY | Facility: MEDICAL CENTER | Age: 53
End: 2023-04-24

## 2023-04-24 ENCOUNTER — TELEPHONE (OUTPATIENT)
Dept: UROLOGY | Facility: MEDICAL CENTER | Age: 53
End: 2023-04-24

## 2023-04-24 VITALS
DIASTOLIC BLOOD PRESSURE: 81 MMHG | OXYGEN SATURATION: 97 % | HEART RATE: 68 BPM | SYSTOLIC BLOOD PRESSURE: 119 MMHG | HEIGHT: 64 IN | BODY MASS INDEX: 32.18 KG/M2 | RESPIRATION RATE: 20 BRPM | TEMPERATURE: 97.5 F | WEIGHT: 188.49 LBS

## 2023-04-24 DIAGNOSIS — R10.33 PERIUMBILICAL ABDOMINAL PAIN: ICD-10-CM

## 2023-04-24 RX ORDER — PROPOFOL 10 MG/ML
INJECTION, EMULSION INTRAVENOUS AS NEEDED
Status: DISCONTINUED | OUTPATIENT
Start: 2023-04-24 | End: 2023-04-24

## 2023-04-24 RX ORDER — SODIUM CHLORIDE 9 MG/ML
125 INJECTION, SOLUTION INTRAVENOUS CONTINUOUS
Status: DISCONTINUED | OUTPATIENT
Start: 2023-04-24 | End: 2023-04-28 | Stop reason: HOSPADM

## 2023-04-24 RX ORDER — LIDOCAINE HYDROCHLORIDE 20 MG/ML
INJECTION, SOLUTION EPIDURAL; INFILTRATION; INTRACAUDAL; PERINEURAL AS NEEDED
Status: DISCONTINUED | OUTPATIENT
Start: 2023-04-24 | End: 2023-04-24

## 2023-04-24 RX ADMIN — PROPOFOL 120 MG: 10 INJECTION, EMULSION INTRAVENOUS at 09:54

## 2023-04-24 RX ADMIN — SODIUM CHLORIDE 125 ML/HR: 0.9 INJECTION, SOLUTION INTRAVENOUS at 09:36

## 2023-04-24 RX ADMIN — PROPOFOL 30 MG: 10 INJECTION, EMULSION INTRAVENOUS at 09:56

## 2023-04-24 RX ADMIN — LIDOCAINE HYDROCHLORIDE 100 MG: 20 INJECTION, SOLUTION EPIDURAL; INFILTRATION; INTRACAUDAL; PERINEURAL at 09:54

## 2023-04-24 RX ADMIN — PROPOFOL 20 MG: 10 INJECTION, EMULSION INTRAVENOUS at 10:00

## 2023-04-24 RX ADMIN — PROPOFOL 30 MG: 10 INJECTION, EMULSION INTRAVENOUS at 09:58

## 2023-04-24 NOTE — ANESTHESIA PREPROCEDURE EVALUATION
Procedure:  EGD    Relevant Problems   ANESTHESIA (within normal limits)      CARDIO   (+) Ascending aortic aneurysm (HCC)   (+) Essential hypertension   (+) Mixed hyperlipidemia      GI/HEPATIC   (+) Hepatic steatosis      MUSCULOSKELETAL   (+) Chronic midline low back pain without sciatica      NEURO/PSYCH   (+) Chronic midline low back pain without sciatica   (+) History of diabetes mellitus      Nervous and Auditory   (+) Benign paroxysmal positional vertigo due to bilateral vestibular disorder      Other   (+) Ductal carcinoma in situ (DCIS) of breast   (+) Obesity   (+) Status post laparoscopic sleeve gastrectomy        Physical Exam    Airway    Mallampati score: II  TM Distance: <3 FB  Neck ROM: full     Dental   No notable dental hx     Cardiovascular  Rhythm: regular, Rate: normal, Cardiovascular exam normal    Pulmonary  Pulmonary exam normal Breath sounds clear to auscultation,     Other Findings        Anesthesia Plan  ASA Score- 2     Anesthesia Type- IV sedation with anesthesia with ASA Monitors  Additional Monitors:   Airway Plan:           Plan Factors-    Chart reviewed  EKG reviewed  Existing labs reviewed  Patient summary reviewed  Patient is not a current smoker  Induction- intravenous  Postoperative Plan-     Informed Consent- Anesthetic plan and risks discussed with patient

## 2023-04-24 NOTE — ANESTHESIA POSTPROCEDURE EVALUATION
"Post-Op Assessment Note    CV Status:  Stable    Pain management: adequate     Mental Status:  Alert and awake   Hydration Status:  Euvolemic   PONV Controlled:  Controlled   Airway Patency:  Patent   Two or more mitigation strategies used for obstructive sleep apnea   Post Op Vitals Reviewed: Yes      Staff: Anesthesiologist         No notable events documented      BP      Temp      Pulse     Resp      SpO2      /81   Pulse 68   Temp 97 5 °F (36 4 °C) (Temporal)   Resp 20   Ht 5' 4\" (1 626 m)   Wt 85 5 kg (188 lb 7 9 oz)   LMP 12/15/2018 (Approximate) Comment: Hx of tubal ligation  SpO2 97%   BMI 32 35 kg/m²     "

## 2023-04-24 NOTE — TELEPHONE ENCOUNTER
Patient seen by Marshall Dickson at [address] [facility]    Patient called stating she was at ER 04/14/23 with left flank pain and she is to see Urology  She is having some pain   She has a ct scan and it showed     Mild right hydroureteronephrosis secondary to a 3 mm calculus at the UVJ  She would like to know how to proceed          She can be reached at [phone]

## 2023-04-25 ENCOUNTER — TELEPHONE (OUTPATIENT)
Dept: CARDIOLOGY CLINIC | Facility: CLINIC | Age: 53
End: 2023-04-25

## 2023-04-25 NOTE — TELEPHONE ENCOUNTER
Patient called yesterday, states she was seen in the ER and they advised that her aortic aneurysm was larger and wanted her to see a thoracic surgeon  She scheduled for 6/8/23, asking if this appointment is ok? Spoke with Dr Sánchez Prasad, states that appointment is fine, any symptoms she should go to ER  Left message for patient, advised above recommendations

## 2023-05-15 ENCOUNTER — TELEPHONE (OUTPATIENT)
Dept: GASTROENTEROLOGY | Facility: MEDICAL CENTER | Age: 53
End: 2023-05-15

## 2023-05-15 DIAGNOSIS — A04.8 H. PYLORI INFECTION: Primary | ICD-10-CM

## 2023-05-15 RX ORDER — TETRACYCLINE HYDROCHLORIDE 500 MG/1
500 CAPSULE ORAL EVERY 6 HOURS
Qty: 56 CAPSULE | Refills: 0 | Status: SHIPPED | OUTPATIENT
Start: 2023-05-15 | End: 2023-05-29

## 2023-05-15 RX ORDER — METRONIDAZOLE 250 MG/1
250 TABLET ORAL EVERY 6 HOURS
Qty: 56 TABLET | Refills: 0 | Status: SHIPPED | OUTPATIENT
Start: 2023-05-15 | End: 2023-05-29

## 2023-05-15 RX ORDER — BISMUTH SUBSALICYLATE 262 MG/1
262 TABLET, CHEWABLE ORAL EVERY 6 HOURS
Qty: 30 TABLET | Refills: 0 | Status: SHIPPED | OUTPATIENT
Start: 2023-05-15 | End: 2023-05-29

## 2023-05-15 RX ORDER — OMEPRAZOLE 40 MG/1
40 CAPSULE, DELAYED RELEASE ORAL
Qty: 28 CAPSULE | Refills: 0 | Status: SHIPPED | OUTPATIENT
Start: 2023-05-15 | End: 2023-05-29

## 2023-05-15 NOTE — TELEPHONE ENCOUNTER
----- Message from Antonio Hobbs sent at 5/15/2023 12:31 PM EDT -----    ----- Message -----  From: Rivka Alberto MD  Sent: 5/15/2023   9:11 AM EDT  To: [facility]    Please call patient :  Biopsies showed H  Pylori infection  Please have the clinical team start patient on H  PYlori treatment and follow up with stool test and then with Dr Jl Saenz  Thank you

## 2023-05-15 NOTE — TELEPHONE ENCOUNTER
Pt NKA, pt already taking omeprazole 40 mg daily, pt takes a statin medication  Quad therapy & stool test ordered for patient and sent to pharmacy  [facility] for patient to call back to review treatment instructions:    -Omeprazole 40 mg (acid reducing) two times daily for 14 days  Take 20-30 minutes prior to eating  It is more effective on an empty stomach    -Tetracycline 500 mg (antibiotic) four times daily for 14 days  Take at same time as Metronidazole (Flagyl)  -Metronidazole 250 mg (antibiotic) four times daily for 14 days  Take at same time as tetracycline  -Pepto bismol 262 mg (chewable tablet form) four times daily for 14 days  Take approx 15-20 minutes before the antibiotics to decrease stomach upset  Please keep in mind:  -Do NOT start your medication until you have all four of them  It is important all medication is started together at the same time  -You can take antibiotic medication together in the morning, afternoon, evening, and bedtime with food to decrease stomach upset  -Please stop the omeprazole you currently take and only take the one we have sent in to your pharmacy  -No alcohol for 14 days while being treated  It is common to experience lack of appetite and diarrhea during treatment  It is important to stay hydrated by drinking plenty of liquids (at least 64 oz daily) such as water, Gatorade, juice  You can manage the symptoms of nausea and diarrhea by following diet recommendations for symptom management  Clear liquids and advancing depending on symptoms to a BLAND diet  Do not eat anything fried, fatty, greasy or spicy  Retesting:  -one month after FINISHING your treatment, please complete a H pylori stool test  You can go to any [facility]'s lab to  the supplies  They will instruct you how to complete stool test  Once complete, drop stool test back off at the lab  It is important you are off omeprazole two weeks prior to ensure accuracy of results

## 2023-05-15 NOTE — LETTER
May 19, 2023     [address]    Dear Jimi Part,     Your stomach biopsies showed H  Pylori infection  We have prescribed a specific course of treatment for this  Please review the instructions and call [phone] with any questions or concerns  I have listed below the H  Pylori treatment regimen that we sent to your pharmacy    -Omeprazole 40 mg (acid reducing) two times daily for 14 days  Take 20-30 minutes prior to eating  It is more effective on an empty stomach    -Tetracycline 500 mg (antibiotic) four times daily for 14 days  Take at same time as Metronidazole (Flagyl)  -Metronidazole 250 mg (antibiotic) four times daily for 14 days  Take at same time as tetracycline  -Pepto bismol 262 mg (chewable tablet form) four times daily for 14 days  Take approx 15-20 minutes before the antibiotics to decrease stomach upset  Please keep in mind:  -Do NOT start your medication until you have all four of them  It is important all medication is started together at the same time  -You can take antibiotic medication together in the morning, afternoon, evening, and bedtime with food to decrease stomach upset  -Please stop the omeprazole you currently take and only take the one we have sent in to your pharmacy  -No alcohol for 14 days while being treated  It is common to experience lack of appetite and diarrhea during treatment  It is important to stay hydrated by drinking plenty of liquids (at least 64 oz daily) such as water, Gatorade, juice  You can manage the symptoms of nausea and diarrhea by following diet recommendations for symptom management  Clear liquids and advancing depending on symptoms to a BLAND diet  Do not eat anything fried, fatty, greasy or spicy  Retesting:  -one month after FINISHING your treatment, please complete a H pylori stool test  You can go to any [facility]'s lab to  the supplies   They will instruct you how to complete stool test  Once complete, drop stool test back off at the lab  It is important you are off omeprazole two weeks prior to ensure accuracy of results     Thank you,   Amos Sepulveda, [facility]

## 2023-05-16 ENCOUNTER — TELEPHONE (OUTPATIENT)
Dept: GASTROENTEROLOGY | Facility: MEDICAL CENTER | Age: 53
End: 2023-05-16

## 2023-05-19 NOTE — TELEPHONE ENCOUNTER
LVM #2 advising patient to call back for H pylori treatment instruction, advised of prescriptions sent to her pharmacy  Letter created and sent

## 2023-05-23 ENCOUNTER — OFFICE VISIT (OUTPATIENT)
Dept: FAMILY MEDICINE CLINIC | Facility: CLINIC | Age: 53
End: 2023-05-23

## 2023-05-23 VITALS
DIASTOLIC BLOOD PRESSURE: 78 MMHG | WEIGHT: 188 LBS | HEIGHT: 64 IN | TEMPERATURE: 97.7 F | BODY MASS INDEX: 32.1 KG/M2 | OXYGEN SATURATION: 98 % | RESPIRATION RATE: 16 BRPM | HEART RATE: 104 BPM | SYSTOLIC BLOOD PRESSURE: 120 MMHG

## 2023-05-23 DIAGNOSIS — E55.9 VITAMIN D DEFICIENCY: ICD-10-CM

## 2023-05-23 DIAGNOSIS — K29.70 HELICOBACTER PYLORI GASTRITIS: ICD-10-CM

## 2023-05-23 DIAGNOSIS — I10 ESSENTIAL HYPERTENSION: Primary | ICD-10-CM

## 2023-05-23 DIAGNOSIS — E78.2 MIXED HYPERLIPIDEMIA: ICD-10-CM

## 2023-05-23 DIAGNOSIS — B96.81 HELICOBACTER PYLORI GASTRITIS: ICD-10-CM

## 2023-05-23 RX ORDER — EZETIMIBE 10 MG/1
10 TABLET ORAL DAILY
Qty: 90 TABLET | Refills: 1 | Status: SHIPPED | OUTPATIENT
Start: 2023-05-23

## 2023-05-23 RX ORDER — MELATONIN
1000 DAILY
Qty: 90 TABLET | Refills: 1 | Status: SHIPPED | OUTPATIENT
Start: 2023-05-23

## 2023-05-23 NOTE — ASSESSMENT & PLAN NOTE
-Chronic intermittent epigastric pain and bloating  -Recently diagnosed with H  Pylori  -Patient evaluated by GI and started on treatment  -Patient has not started antibiotics and PPI yet  -Recommend completing antibiotics and PPI for 14 days

## 2023-05-23 NOTE — ASSESSMENT & PLAN NOTE
Well-controlled  Blood pressure goal less than 140/90  Continue amlodipine and metoprolol  DASH diet discussed with patient

## 2023-05-23 NOTE — PROGRESS NOTES
Name: Delphine Jaimes      : 1970      MRN: [id number]  Encounter Provider: Chapincito Hawk MD  Encounter Date: 2023   Encounter department: [address] [place] [address]     1  Essential hypertension  Assessment & Plan:  Well-controlled  Blood pressure goal less than 140/90  Continue amlodipine and metoprolol  DASH diet discussed with patient    Orders:  -     Hemoglobin A1C; Future    2  Mixed hyperlipidemia  Assessment & Plan:  Significant family history of hypercholesterol with mom/sister -  -Her most recent  and total cholesterol of 290  -Extensive dietary counseling provided  -Zetia added for additional LDL lowering  -Goal of LDL less than 70  -Continue max dose of atorvastatin 80 mg    Orders:  -     ezetimibe (ZETIA) 10 mg tablet; Take 1 tablet (10 mg total) by mouth daily  -     Lipid Panel with Direct LDL reflex; Future    3  Helicobacter pylori gastritis  Assessment & Plan:  -Chronic intermittent epigastric pain and bloating  -Recently diagnosed with H  Pylori  -Patient evaluated by GI and started on treatment  -Patient has not started antibiotics and PPI yet  -Recommend completing antibiotics and PPI for 14 days      4  Vitamin D deficiency  -     cholecalciferol (VITAMIN D3) 1,000 units tablet; Take 1 tablet (1,000 Units total) by mouth in the morning 22 pt         Subjective      60-year-old female with a past medical history of hyperlipidemia, prediabetes, hypertension, and ascending aortic aneurysm who presents today for follow-up on her medical conditions  Patient reports that she recently had surgery for de Quervain's tendinitis  She is making good recovery  She was recently diagnosed with H  pylori for her gastritis  She has been experiencing chronic epigastric pain and abdominal bloating  Patient was provided antibiotics and PPI for which she has not yet picked up from the pharmacy    She has been trying to make some modifications to her diet to lower her cholesterol level  She has a strong family history of high cholesterol in her mother and sister  Review of Systems   Constitutional: Negative for chills, diaphoresis, fatigue and fever  HENT: Negative for congestion, ear pain, postnasal drip, rhinorrhea, sinus pain, sneezing, sore throat, trouble swallowing and voice change  Eyes: Negative for visual disturbance  Respiratory: Negative for cough, shortness of breath and wheezing  Cardiovascular: Negative for chest pain, palpitations and leg swelling  Gastrointestinal: Positive for abdominal pain  Negative for abdominal distention, blood in stool, constipation, diarrhea and vomiting  Genitourinary: Negative for dysuria, hematuria and urgency  Musculoskeletal: Negative for arthralgias  Skin: Negative for rash  Neurological: Negative for syncope, weakness, numbness and headaches  Hematological: Negative for adenopathy  Psychiatric/Behavioral: Negative for agitation         Current Outpatient Medications on File Prior to Visit   Medication Sig   • amLODIPine (NORVASC) 5 mg tablet Take 1 tablet (5 mg total) by mouth daily   • atorvastatin (LIPITOR) 80 mg tablet Take 1 tablet (80 mg total) by mouth daily   • famotidine (PEPCID) 20 mg tablet Take 1 tablet (20 mg total) by mouth 2 (two) times a day   • ibuprofen (MOTRIN) 600 mg tablet Take 1 tablet (600 mg total) by mouth every 6 (six) hours as needed for mild pain   • loratadine (CLARITIN) 10 mg tablet Take 1 tablet (10 mg total) by mouth daily   • metFORMIN (GLUCOPHAGE-XR) 500 mg 24 hr tablet Take 500 mg by mouth daily with breakfast 4/1/22 Pt reports using daily with breakfast     • [DISCONTINUED] Cholecalciferol (VITAMIN D3 PO) Take by mouth in the morning 4/1/22 pt   • acetaminophen (TYLENOL) 500 mg tablet Take 1 tablet (500 mg total) by mouth every 6 (six) hours as needed for mild pain   • azelastine (ASTELIN) 0 1 % nasal spray 1 spray into each "nostril 2 (two) times a day Use in each nostril as directed   • bismuth subsalicylate (PEPTO BISMOL) 262 MG chewable tablet Chew 1 tablet (262 mg total) every 6 (six) hours for 14 days   • dicyclomine (BENTYL) 10 mg capsule Take 1 capsule (10 mg total) by mouth 4 (four) times a day (before meals and at bedtime)   • lidocaine (LMX) 4 % cream Apply topically as needed for mild pain   • meclizine (ANTIVERT) 25 mg tablet Take 1 tablet (25 mg total) by mouth 3 (three) times a day as needed for dizziness   • metoprolol tartrate (LOPRESSOR) 25 mg tablet Take 0 5 tablets (12 5 mg total) by mouth every 12 (twelve) hours   • metroNIDAZOLE (FLAGYL) 250 mg tablet Take 1 tablet (250 mg total) by mouth every 6 (six) hours for 14 days   • omeprazole (PriLOSEC) 40 MG capsule Take 1 capsule (40 mg total) by mouth daily   • omeprazole (PriLOSEC) 40 MG capsule Take 1 capsule (40 mg total) by mouth 2 (two) times a day before meals for 14 days   • ondansetron (ZOFRAN-ODT) 4 mg disintegrating tablet Take 1 tablet (4 mg total) by mouth every 6 (six) hours as needed for nausea or vomiting   • tetracycline (ACHROMYCIN,SUMYCIN) 500 MG capsule Take 1 capsule (500 mg total) by mouth every 6 (six) hours for 14 days   • [DISCONTINUED] buPROPion (WELLBUTRIN XL) 150 mg 24 hr tablet Take 150 mg by mouth daily   • [DISCONTINUED] methylPREDNISolone 4 MG tablet therapy pack Follow package directions (Patient not taking: Reported on 4/19/2023)   • [DISCONTINUED] tamsulosin (FLOMAX) 0 4 mg Take 1 capsule (0 4 mg total) by mouth daily with dinner for 10 days (Patient not taking: Reported on 4/19/2023)   • [DISCONTINUED] topiramate (TOPAMAX) 50 MG tablet Take 50 mg by mouth 2 (two) times a day (Patient not taking: Reported on 4/19/2023)       Objective     /78 (BP Location: Left arm, Patient Position: Sitting, Cuff Size: Large)   Pulse 104   Temp 97 7 °F (36 5 °C) (Temporal)   Resp 16   Ht 5' 4\" (1 626 m)   Wt 85 3 kg (188 lb)   LMP 12/15/2018 " (Approximate) Comment: Hx of tubal ligation  SpO2 98%   BMI 32 27 kg/m²     Physical Exam  Vitals and nursing note reviewed  Constitutional:       General: She is not in acute distress  Appearance: Normal appearance  She is well-developed  She is not ill-appearing, toxic-appearing or diaphoretic  HENT:      Head: Normocephalic and atraumatic  Right Ear: External ear normal       Left Ear: External ear normal       Nose: Nose normal       Mouth/Throat:      Mouth: Mucous membranes are moist       Pharynx: No oropharyngeal exudate or posterior oropharyngeal erythema  Eyes:      General: No scleral icterus  Right eye: No discharge  Left eye: No discharge  Extraocular Movements: Extraocular movements intact  Conjunctiva/sclera: Conjunctivae normal    Neck:      Thyroid: No thyromegaly  Vascular: No JVD  Trachea: No tracheal deviation  Cardiovascular:      Rate and Rhythm: Normal rate and regular rhythm  Pulses:           Dorsalis pedis pulses are 2+ on the right side and 2+ on the left side  Posterior tibial pulses are 2+ on the right side and 2+ on the left side  Heart sounds: Normal heart sounds  No murmur heard  No friction rub  No gallop  Pulmonary:      Effort: Pulmonary effort is normal  No respiratory distress  Breath sounds: Normal breath sounds  No stridor  No wheezing or rhonchi  Abdominal:      General: Bowel sounds are normal  There is no distension  Palpations: Abdomen is soft  There is no mass  Tenderness: There is abdominal tenderness (Epigastric) in the epigastric area  There is no guarding or rebound  Hernia: No hernia is present  Musculoskeletal:         General: Normal range of motion  Cervical back: Normal range of motion and neck supple  Feet:      Right foot:      Skin integrity: No ulcer, skin breakdown, erythema, warmth, callus or dry skin        Left foot:      Skin integrity: No ulcer, skin breakdown, erythema, warmth, callus or dry skin  Skin:     General: Skin is warm  Capillary Refill: Capillary refill takes less than 2 seconds  Findings: No rash  Neurological:      General: No focal deficit present  Mental Status: She is alert and oriented to person, place, and time  Cranial Nerves: No cranial nerve deficit  Motor: No weakness or abnormal muscle tone        Gait: Gait normal    Psychiatric:         Mood and Affect: Mood normal          Behavior: Behavior normal        Laverne Adair MD

## 2023-05-23 NOTE — ASSESSMENT & PLAN NOTE
Significant family history of hypercholesterol with mom/sister -  -Her most recent  and total cholesterol of 290  -Extensive dietary counseling provided  -Zetia added for additional LDL lowering  -Goal of LDL less than 70  -Continue max dose of atorvastatin 80 mg

## 2023-05-25 ENCOUNTER — HOSPITAL ENCOUNTER (OUTPATIENT)
Dept: CT IMAGING | Facility: HOSPITAL | Age: 53
End: 2023-05-25
Attending: INTERNAL MEDICINE

## 2023-05-25 ENCOUNTER — OFFICE VISIT (OUTPATIENT)
Dept: UROLOGY | Facility: CLINIC | Age: 53
End: 2023-05-25

## 2023-05-25 VITALS
BODY MASS INDEX: 31.58 KG/M2 | WEIGHT: 185 LBS | HEART RATE: 106 BPM | OXYGEN SATURATION: 98 % | SYSTOLIC BLOOD PRESSURE: 112 MMHG | DIASTOLIC BLOOD PRESSURE: 84 MMHG | HEIGHT: 64 IN

## 2023-05-25 DIAGNOSIS — N20.1 RIGHT URETERAL STONE: Primary | ICD-10-CM

## 2023-05-25 DIAGNOSIS — N30.90 CYSTITIS: ICD-10-CM

## 2023-05-25 DIAGNOSIS — I71.21 ASCENDING AORTIC ANEURYSM (HCC): ICD-10-CM

## 2023-05-25 LAB
SL AMB  POCT GLUCOSE, UA: NORMAL
SL AMB LEUKOCYTE ESTERASE,UA: NORMAL
SL AMB POCT BILIRUBIN,UA: NORMAL
SL AMB POCT BLOOD,UA: NORMAL
SL AMB POCT CLARITY,UA: CLEAR
SL AMB POCT COLOR,UA: YELLOW
SL AMB POCT KETONES,UA: NORMAL
SL AMB POCT NITRITE,UA: NORMAL
SL AMB POCT PH,UA: 5
SL AMB POCT SPECIFIC GRAVITY,UA: 1.02
SL AMB POCT URINE PROTEIN: NORMAL
SL AMB POCT UROBILINOGEN: 0.2

## 2023-05-25 RX ORDER — OXYCODONE HYDROCHLORIDE 5 MG/1
5 TABLET ORAL EVERY 6 HOURS PRN
Qty: 5 TABLET | Refills: 0 | Status: SHIPPED | OUTPATIENT
Start: 2023-05-25

## 2023-05-25 RX ORDER — TAMSULOSIN HYDROCHLORIDE 0.4 MG/1
0.4 CAPSULE ORAL
Qty: 30 CAPSULE | Refills: 0 | Status: SHIPPED | OUTPATIENT
Start: 2023-05-25

## 2023-05-25 NOTE — PATIENT INSTRUCTIONS
Dietary Management of Kidney Stone Disease    The dietary recommendations for most people who make kidney stones (especially the most common calcium oxalate stones) are uncomplicated and are not too tedious or bland  Most importantly, the following recommendations also promote better health for a variety of reasons  FLUIDS:  The single most important change for the majority patients is the need to greatly increase fluid intake  You should at least produce two liters (about two quarts) of urine each day  Depending on the heat outdoors and your level of physical activity, this usually means consuming ten, 10 ounce glasses (100 ounces) of fluid per day  Water is always a good choice, but other drinks including tea, coffee, soda, and juice are also allowed as long as no one beverage becomes the sole source of fluid  CALCIUM:  There is excellent evidence that calcium should not be avoided, but instead moderated  A range of 600 to 1,100 mg of calcium per day, especially consumed at meals is probably a reasonable target  (i e  2-3 dairy servings per day) This might include small servings of yogurt, milk or ice cream   This amount helps avoid over-absorption of oxalate from the digestive tract and also allows for healthy bone maintenance  SODIUM (SALT): Too much salt in your diet (both from the shaker and in the prepared foods that we buy) is bad for your blood pressure, bad for your heart, and also increases the amount of calcium in your urine  A reasonable sodium restriction to 2,000-2,500mg/day (about the amount in one teaspoon) is an excellent target  You should get into the habit of reading the “Nutrients” labels on all the foods that you eat and watch out for the foods that have a high sodium content (snack foods, smoked or processed foods, caned foods)  Fresh and frozen foods usually have the least amount of sodium      PROTEIN:  High protein diets from animal meat (beef, chicken, pork, fish) also increases the rate of kidney stone formation and is equally unhealthy for your heart  All patients should moderate their meat intake to 3-7 ounces per day, and particularly stay away from red meat protein  OXALATE:  Most stone-formers should avoid heavy intake of oxalate-rich foods  These include green roughage (spinach, mustard, kale), strawberries, chocolate, tea, iced tea, and nuts  In addition, heavy, excess doses of Vitamin C can also produce surges in urinary oxalate levels and should be avoided  ** THESE FOODS ARE HIGH IN OXALATE - TRY TO LIMIT HOW MUCH OF THESE YOU EAT:  Coke and Pepsi  Nuts  Dark Leafy Greens:     Spinach and Kale, Rhubarb, Chard  Asparagus  Beets  Sweet potatoes   Blueberries, Strawberries   Dark tea (Green tea is okay)  Tofu      DRINK 3 QUARTS (96 Oz ) LIQUIDS EACH DAY - ALL LIQUIDS COUNT        ADD LEMON JUICE 3 OZ  DAILY - Fresh squeezed or lemon juice concentrate - Not MinuteMaid, [place]  [place] ([facility] [place], Crystal Lite, etc         ** Recipe for SHI'S OLDYAS TYME LEMONADE:         One ounce of lemon juice, glass of water, sweetener of your choice    Coffee is okay! Cranberry juice is good to prevent infections, but does not help for stones  BARE-BONES RECOMMENDATIONS:  Fluids, fluids, fluids  Low salt diet (your primary care doctor will love you)  Moderate red meat intake  Moderate calcium (dairy products), especially with meals

## 2023-05-25 NOTE — PROGRESS NOTES
"  Assessment and plan:     Right ureteral stone  · 3 mm right UVJ calculus with mild hydronephrosis on imaging 4/14/2023  · Schedule CT stone study for ongoing flank pain  · Reviewed AUA dietary recommendations and hydration goals  · Follow-up 1 year, sooner pending results of imaging  · We discussed surgical removal of the kidney stone if there is still present, will order CT 1 week prior to OR if she requires surgical intervention for ongoing ureteral calculi  · ER precautions for fevers or severe pain      HEMANT Rogel    History of Present Illness     Lynette Josey is a 47 y o  female known to our practice for history of cystitis  She presented to the emergency department 4/14/2023 for right-sided flank pain radiating to the right abdomen  She was also having nausea and vomiting  She reports intermittent ongoing right flank pain  She last had pain on Sunday  She has not seen a stone pass  She had prior kidney stones, her last one was 1 year ago  She reports passing the stone  She sometimes feels like she has to urinate but cannot  She is not on flomax, she has h  Pylori and is on 2 antibiotics  She also recently had surgery to her right wrist  Sometimes she only drinks 1-2 bottles of water  When she works she cannot drink water      Laboratory     Lab Results   Component Value Date    BUN 14 04/14/2023    CREATININE 0 97 04/14/2023       No components found for: \"GFR\"    Lab Results   Component Value Date    CALCIUM 10 0 04/14/2023     04/14/2023    CO2 24 04/14/2023    GLUCOSE 105 (H) 05/11/2018    K 3 4 (L) 04/14/2023     05/11/2018       Lab Results   Component Value Date    HCT 40 7 04/14/2023    HGB 13 2 04/14/2023    MCV 87 04/14/2023     04/14/2023    WBC 6 27 04/14/2023       No results found for: \"PSA\"    Recent Results (from the past 1 hour(s))   POCT urine dip    Collection Time: 05/25/23  3:21 PM   Result Value Ref Range    LEUKOCYTE ESTERASE,UA -     NITRITE,UA -  " SL AMB POCT UROBILINOGEN 0 2     POCT URINE PROTEIN -      PH,UA 5 0     BLOOD,UA -     SPECIFIC GRAVITY,UA 1 020     KETONES,UA -     BILIRUBIN,UA -     GLUCOSE, UA -      COLOR,UA yellow     CLARITY,UA clear        @RESULT(URINEMICROSCOPIC)@    @RESULT(URINECULTURE)@    Radiology   CT CHEST, ABDOMEN AND PELVIS WITH IV CONTRAST 4/14/2023     INDICATION: Right flank pain, history of aortic aneurysm      COMPARISON:  CT abdomen pelvis 2/12/2023, CT chest 5/21/2022     TECHNIQUE: CT examination of the chest, abdomen and pelvis was performed  Multiplanar 2D reformatted images were created from the source data  Please note the study was not ordered as a dissection study, therefore noncontrast imaging was not performed      Radiation dose length product (DLP) for this visit:  653 mGy-cm  This examination, like all CT scans performed in the [facility], was performed utilizing techniques to minimize radiation dose exposure, including the use of iterative   reconstruction and automated exposure control      IV Contrast:  100 mL of iohexol (OMNIPAQUE)  Enteric Contrast: Enteric contrast was not administered      FINDINGS:     CHEST     LUNGS:  4 mm nodular density seen along the undersurface right minor fissure is unchanged, likely benign intrapulmonary lymph node  Calcified left lower lobe granuloma        Minor dependent hypoventilatory changes without confluent infiltrates      No central endobronchial lesions      PLEURA:  Unremarkable      HEART/GREAT VESSELS: 4 7 cm ascending aortic aneurysm  No evidence of dissection  The mid to distal aortic arch and descending thoracic aorta are normal caliber      MEDIASTINUM AND JUAN A:  Unremarkable  Small calcified lymph node left hilum      CHEST WALL AND LOWER NECK:  Unremarkable      ABDOMEN     LIVER/BILIARY TREE:  Liver is diffusely decreased in density consistent with fatty change  No CT evidence of suspicious hepatic mass  Normal hepatic contours  No biliary dilatation      GALLBLADDER:  No calcified gallstones  No pericholecystic inflammatory change      SPLEEN:  Unremarkable      PANCREAS:  Unremarkable      ADRENAL GLANDS:  Unremarkable      KIDNEYS/URETERS:  3 mm right ureteral calculus at the UVJ causing mild hydroureteronephrosis  Mild right perinephric edema, may be reactive  No patchy enhancement to suggest pyelonephritis      The left kidney is unremarkable      STOMACH AND BOWEL:  Sleeve gastrectomy      The small bowel is normal caliber      No focal inflammatory changes or fluid collections are identified      APPENDIX:  No findings to suggest appendicitis      ABDOMINOPELVIC CAVITY:  No ascites  No pneumoperitoneum  No lymphadenopathy      VESSELS:  Atherosclerotic changes abdominal aorta which is normal caliber      PELVIS     REPRODUCTIVE ORGANS:  Unremarkable for patient's age      URINARY BLADDER:  Unremarkable      ABDOMINAL WALL/INGUINAL REGIONS:  Ventral anterior pelvic wall scarring      OSSEOUS STRUCTURES:  No acute fracture or destructive osseous lesion  Mild multiple degenerative changes of the spine  Grade 1 spondylolisthesis L4 on L5 secondary to facet arthropathy         IMPRESSION:     1  Mild right hydroureteronephrosis secondary to a 3 mm calculus at the UVJ      2  4 7 cm ascending aortic aneurysm, increased from 4 4 cm previously  No evidence of dissection  Management recommendation is for nonemergent cardiothoracic surgery consultation      3  Fatty liver  Review of Systems     Review of Systems   Constitutional: Negative for activity change, appetite change, chills, fatigue, fever and unexpected weight change  HENT: Negative for facial swelling  Eyes: Negative for discharge  Respiratory: Negative  Negative for cough and shortness of breath  Cardiovascular: Negative for chest pain and leg swelling  Gastrointestinal: Negative    Negative for abdominal distention, abdominal pain, constipation, diarrhea, nausea and vomiting  Endocrine: Negative  Genitourinary: Positive for flank pain  Negative for decreased urine volume, difficulty urinating, dysuria, enuresis, frequency, genital sores, hematuria and urgency  Musculoskeletal: Negative for back pain and myalgias  Skin: Negative for pallor and rash  Allergic/Immunologic: Negative  Negative for immunocompromised state  Neurological: Negative for facial asymmetry and speech difficulty  Psychiatric/Behavioral: Negative for agitation and confusion  Allergies     No Known Allergies    Physical Exam     Physical Exam  Vitals reviewed  Constitutional:       General: She is not in acute distress  Appearance: Normal appearance  She is not ill-appearing, toxic-appearing or diaphoretic  HENT:      Head: Normocephalic and atraumatic  Eyes:      General: No scleral icterus  Cardiovascular:      Rate and Rhythm: Normal rate  Pulmonary:      Effort: Pulmonary effort is normal  No respiratory distress  Abdominal:      General: Abdomen is flat  There is no distension  Palpations: Abdomen is soft  Tenderness: There is abdominal tenderness  There is right CVA tenderness  There is no left CVA tenderness, guarding or rebound  Musculoskeletal:         General: No swelling  Cervical back: Normal range of motion  Skin:     General: Skin is warm and dry  Coloration: Skin is not jaundiced or pale  Findings: No rash  Neurological:      General: No focal deficit present  Mental Status: She is alert and oriented to person, place, and time  Gait: Gait normal    Psychiatric:         Mood and Affect: Mood normal          Behavior: Behavior normal          Thought Content:  Thought content normal          Judgment: Judgment normal          Vital Signs     Vitals:    05/25/23 1513   BP: 112/84   BP Location: Left arm   Patient Position: Sitting   Cuff Size: Adult   Pulse: (!) 106   SpO2: 98%   Weight: 83 9 kg (185 lb)   Height: "5' 4\" (1 626 m)       Current Medications       Current Outpatient Medications:   •  acetaminophen (TYLENOL) 500 mg tablet, Take 1 tablet (500 mg total) by mouth every 6 (six) hours as needed for mild pain, Disp: 60 tablet, Rfl: 0  •  amLODIPine (NORVASC) 5 mg tablet, Take 1 tablet (5 mg total) by mouth daily, Disp: 90 tablet, Rfl: 1  •  azelastine (ASTELIN) 0 1 % nasal spray, 1 spray into each nostril 2 (two) times a day Use in each nostril as directed, Disp: 30 mL, Rfl: 1  •  bismuth subsalicylate (PEPTO BISMOL) 262 MG chewable tablet, Chew 1 tablet (262 mg total) every 6 (six) hours for 14 days, Disp: 30 tablet, Rfl: 0  •  cholecalciferol (VITAMIN D3) 1,000 units tablet, Take 1 tablet (1,000 Units total) by mouth in the morning 4/1/22 pt, Disp: 90 tablet, Rfl: 1  •  dicyclomine (BENTYL) 10 mg capsule, Take 1 capsule (10 mg total) by mouth 4 (four) times a day (before meals and at bedtime), Disp: 30 capsule, Rfl: 1  •  ezetimibe (ZETIA) 10 mg tablet, Take 1 tablet (10 mg total) by mouth daily, Disp: 90 tablet, Rfl: 1  •  famotidine (PEPCID) 20 mg tablet, Take 1 tablet (20 mg total) by mouth 2 (two) times a day, Disp: 30 tablet, Rfl: 0  •  ibuprofen (MOTRIN) 600 mg tablet, Take 1 tablet (600 mg total) by mouth every 6 (six) hours as needed for mild pain, Disp: 60 tablet, Rfl: 0  •  lidocaine (LMX) 4 % cream, Apply topically as needed for mild pain, Disp: 30 g, Rfl: 0  •  loratadine (CLARITIN) 10 mg tablet, Take 1 tablet (10 mg total) by mouth daily, Disp: 30 tablet, Rfl: 0  •  meclizine (ANTIVERT) 25 mg tablet, Take 1 tablet (25 mg total) by mouth 3 (three) times a day as needed for dizziness, Disp: 30 tablet, Rfl: 3  •  metoprolol tartrate (LOPRESSOR) 25 mg tablet, Take 0 5 tablets (12 5 mg total) by mouth every 12 (twelve) hours, Disp: 90 tablet, Rfl: 1  •  metroNIDAZOLE (FLAGYL) 250 mg tablet, Take 1 tablet (250 mg total) by mouth every 6 (six) hours for 14 days, Disp: 56 tablet, Rfl: 0  •  omeprazole (PriLOSEC) " "40 MG capsule, Take 1 capsule (40 mg total) by mouth daily, Disp: 30 capsule, Rfl: 5  •  omeprazole (PriLOSEC) 40 MG capsule, Take 1 capsule (40 mg total) by mouth 2 (two) times a day before meals for 14 days, Disp: 28 capsule, Rfl: 0  •  ondansetron (ZOFRAN-ODT) 4 mg disintegrating tablet, Take 1 tablet (4 mg total) by mouth every 6 (six) hours as needed for nausea or vomiting, Disp: 20 tablet, Rfl: 0  •  tamsulosin (FLOMAX) 0 4 mg, Take 1 capsule (0 4 mg total) by mouth daily with dinner, Disp: 30 capsule, Rfl: 0  •  tetracycline (ACHROMYCIN,SUMYCIN) 500 MG capsule, Take 1 capsule (500 mg total) by mouth every 6 (six) hours for 14 days, Disp: 56 capsule, Rfl: 0  •  atorvastatin (LIPITOR) 80 mg tablet, Take 1 tablet (80 mg total) by mouth daily, Disp: 90 tablet, Rfl: 2  •  metFORMIN (GLUCOPHAGE-XR) 500 mg 24 hr tablet, Take 500 mg by mouth daily with breakfast 4/1/22 Pt reports using daily with breakfast   (Patient not taking: Reported on 5/25/2023), Disp: , Rfl:     Active Problems     Patient Active Problem List   Diagnosis   • Ductal carcinoma in situ (DCIS) of breast   • Essential hypertension   • Obesity   • Uterine fibroid   • History of diabetes mellitus   • Status post laparoscopic sleeve gastrectomy   • Chronic midline low back pain without sciatica   • Ascending aortic aneurysm (HCC)   • Annual physical exam   • Benign paroxysmal positional vertigo due to bilateral vestibular disorder   • Mixed hyperlipidemia   • Sprain of right wrist   • Lipodystrophy   • Vitamin D deficiency   • Ptosis of breast   • Dyspepsia   • Allergic rhinitis   • Deviated nasal septum   • Hepatic steatosis   • Helicobacter pylori gastritis   • Right ureteral stone       Past Medical History     Past Medical History:   Diagnosis Date   • Anemia     4/1/22 Pt reports hx of anemia - no current issues at this time    • Anesthesia     4/1/22 Pt reports her mother had reaction to anesthesia - reports her mother \"didnt wake up for two " "weeks\"    • Aortic aneurysm ([facility] )    • [facility] ) 01/20/2017    left side   • Depression     4/1/22 Hx of depression - resolved per pt at this time    • Diabetes mellitus ([facility] )    • GERD (gastroesophageal reflux disease)    • History of chemotherapy 2017    chest radiation   • History of COVID-19     4/1/22 Pt reports COVID in 2021   • Hyperlipidemia    • Hypertension    • Left wrist pain 07/22/2019   • Migraine    • Obesity     Post medical management with Phentermine/topiramate    • Prediabetes    • Sleep apnea     Hx of sleep apnea - using CPAP in the past but since gastric surgery no further issues    • Tendinitis     4/1/22 Pt reports right hand tendinitis        Surgical History     Past Surgical History:   Procedure Laterality Date   • ABDOMINOPLASTY N/A 4/6/2022    Procedure: ABDOMINOPLASTY;  Surgeon: Jhonny Esquivel MD;  Location: [address] [place] [address];  Service: Plastics   • BREAST BIOPSY Left 12/06/2016    stereo   • BREAST LUMPECTOMY W/ NEEDLE LOCALIZATION Left 01/20/2017    RADIATION,HORMONE THERAPY  (DCIS LEFT BREAST)   • COLONOSCOPY     • GASTRECTOMY SLEEVE LAPAROSCOPIC     • MD EXCISION SKIN ABD INFRAUMBILICAL PANNICULECTOMY N/A 4/6/2022    Procedure: LIPECTOMY;  Surgeon: Jhonny Esquivel MD;  Location: [address] [place] [address] OR;  Service: Plastics   • MD MASTOPEXY Bilateral 10/6/2022    Procedure: MASTOPEXY BREAST;  Surgeon: Jhonny Esquivel MD;  Location: [address] [place] [address];  Service: Plastics   • TUBAL LIGATION         Family History     Family History   Problem Relation Age of Onset   • Hyperlipidemia Mother    • Diabetes Mother    • Hypertension Mother    • Colon cancer Father    • Liver cancer Father    • Hypertension Sister    • Hypertension Brother    • No Known Problems Daughter    • No Known Problems Maternal Grandmother    • No Known Problems Maternal Grandfather    • Skin cancer Paternal Grandmother    • Lymphoma Paternal Grandfather        Social History     Social History     Social History     Tobacco Use " Smoking Status Never   Smokeless Tobacco Never   Tobacco Comments    Denies former or current use       Past Surgical History:   Procedure Laterality Date   • ABDOMINOPLASTY N/A 4/6/2022    Procedure: ABDOMINOPLASTY;  Surgeon: Zoya Araujo MD;  Location: [address] [place] [address];  Service: Plastics   • BREAST BIOPSY Left 12/06/2016    stereo   • BREAST LUMPECTOMY W/ NEEDLE LOCALIZATION Left 01/20/2017    RADIATION,HORMONE THERAPY  (DCIS LEFT BREAST)   • COLONOSCOPY     • GASTRECTOMY SLEEVE LAPAROSCOPIC     • WA EXCISION SKIN ABD INFRAUMBILICAL PANNICULECTOMY N/A 4/6/2022    Procedure: LIPECTOMY;  Surgeon: Zoya Araujo MD;  Location: [address] [place] [address];  Service: Plastics   • WA MASTOPEXY Bilateral 10/6/2022    Procedure: MASTOPEXY BREAST;  Surgeon: Zoya Araujo MD;  Location: [address] [place] [address];  Service: Plastics   • TUBAL LIGATION           The following portions of the patient's history were reviewed and updated as appropriate: allergies, current medications, past family history, past medical history, past social history, past surgical history and problem list    Please note :  Voice dictation software has been used to create this document  There may be inadvertent transcription errors      [id number] [id number] [place]

## 2023-05-25 NOTE — ASSESSMENT & PLAN NOTE
· 3 mm right UVJ calculus with mild hydronephrosis on imaging 4/14/2023  · Schedule CT stone study for ongoing flank pain  · Reviewed AUA dietary recommendations and hydration goals  · Follow-up 1 year, sooner pending results of imaging  · We discussed surgical removal of the kidney stone if there is still present, will order CT 1 week prior to OR if she requires surgical intervention for ongoing ureteral calculi  · ER precautions for fevers or severe pain

## 2023-05-28 LAB — BACTERIA UR CULT: ABNORMAL

## 2023-05-31 ENCOUNTER — TELEPHONE (OUTPATIENT)
Dept: UROLOGY | Facility: CLINIC | Age: 53
End: 2023-05-31

## 2023-05-31 DIAGNOSIS — N30.90 CYSTITIS: Primary | ICD-10-CM

## 2023-05-31 RX ORDER — SULFAMETHOXAZOLE AND TRIMETHOPRIM 800; 160 MG/1; MG/1
1 TABLET ORAL EVERY 12 HOURS SCHEDULED
Qty: 14 TABLET | Refills: 0 | Status: SHIPPED | OUTPATIENT
Start: 2023-05-31 | End: 2023-06-08

## 2023-05-31 NOTE — RESULT ENCOUNTER NOTE
Please let patient know urine culture is coming back positive  Bactrim was sent to her pharmacy    She should hydrate with at least 2 L of water a day

## 2023-05-31 NOTE — TELEPHONE ENCOUNTER
Tried calling patient but was unable to leave a VM due to the patient's voicebox being full  Will try calling again later  ----- Message from [address] [place] [address] sent at 5/31/2023  1:01 PM EDT -----  Please let patient know urine culture is coming back positive  Bactrim was sent to her pharmacy    She should hydrate with at least 2 L of water a day

## 2023-06-01 NOTE — TELEPHONE ENCOUNTER
Tried calling patient but was unable to leave a VM due to the patient's voicebox being full  Will try calling again later

## 2023-06-02 NOTE — TELEPHONE ENCOUNTER
I have not tried the [facility] because they are not on the communication form

## 2023-06-02 NOTE — TELEPHONE ENCOUNTER
Please call patient and/or her emergency contact and ask that pt return our call to review her care  Thanks    When pt returns call please notify her that the [facility] are the pharmacy for her and for review of CT results (she is scheduled to have CT scan tonight at 7pm)

## 2023-06-05 NOTE — PROGRESS NOTES
Cardiology Office Note  MD Toro Cruz MD Herman Lowenstein, DO, MD Ivana Laguna DO, Guillermina Mcgarry DO, [facility] [place]  ----------------------------------------------------------------  [address] [place] Qu[place]ga [birth year] 47 y o  female MRN: [id number]  Unit/Bed#:  Encounter: [id number]      History of Present Illness: It was a pleasure to see Prince Henry in the office today for follow up CV evaluation  She has a past medical history of hypertension, dyslipidemia, ascending aortic aneurysm most recently at 4 3 cm in October 2020 and left-sided breast cancer with radiation therapy it is been in remission since 2017  She is here today to establish care  Since January 2021, the patient has been experiencing a chest discomfort in the middle of her chest   The discomfort is sharp in nature radiating into her left neck and jaw  She occasionally has palpitations, but denied any lightheadedness or loss of consciousness  She has no personal history of cardiovascular disease of which she is aware  She does state that her mother has had heart artery disease in her 76s  Since our encounter in February 2021, the patient has had no further episodes of chest discomfort or palpitations  Overall, she has felt back to her usual state of health  She underwent echocardiogram and stress test in April 2021  Echocardiogram demonstrated normal left ventricular function without any severe valvular disease  The stress test was found to be negative for myocardial ischemia with good overall functional capacity  Due to the patient's known history of thoracic aortic aneurysm, she was sent for CT scan of the chest to evaluate progression in May 2023  Scan demonstrated aorta was now 4 5 cm  She is here today to discuss the results  She denies any chest pain, pressure, tightness or squeezing    She denies any significant "shortness of breath, lower extremity swelling, orthopnea or paroxysmal nocturnal dyspnea  Denies lightheadedness or dizziness  Review of Systems:  Review of Systems   Constitutional: Negative for decreased appetite, fever, weight gain and weight loss  HENT: Negative for congestion and sore throat  Eyes: Negative for visual disturbance  Cardiovascular: Negative for chest pain, dyspnea on exertion, leg swelling, near-syncope and palpitations  Respiratory: Negative for cough and shortness of breath  Hematologic/Lymphatic: Negative for bleeding problem  Skin: Negative for rash  Musculoskeletal: Negative for myalgias and neck pain  Gastrointestinal: Negative for abdominal pain and nausea  Neurological: Negative for light-headedness and weakness  Psychiatric/Behavioral: Negative for depression         Past Medical History:   Diagnosis Date   • Anemia     4/1/22 Pt reports hx of anemia - no current issues at this time    • Anesthesia     4/1/22 Pt reports her mother had reaction to anesthesia - reports her mother \"didnt wake up for two weeks\"    • Aortic aneurysm [facility])    • [facility] ) 01/20/2017    left side   • Depression     4/1/22 Hx of depression - resolved per pt at this time    • Diabetes mellitus ([facility] )    • GERD (gastroesophageal reflux disease)    • History of chemotherapy 2017    chest radiation   • History of COVID-19     4/1/22 Pt reports COVID in 2021   • Hyperlipidemia    • Hypertension    • Left wrist pain 07/22/2019   • Migraine    • Obesity     Post medical management with Phentermine/topiramate    • Prediabetes    • Sleep apnea     Hx of sleep apnea - using CPAP in the past but since gastric surgery no further issues    • Tendinitis     4/1/22 Pt reports right hand tendinitis        Past Surgical History:   Procedure Laterality Date   • ABDOMINOPLASTY N/A 04/06/2022    Procedure: ABDOMINOPLASTY;  Surgeon: Jorgito Mahmood MD;  Location:  MAIN OR;  Service: Plastics   • " BREAST BIOPSY Left 12/06/2016    stereo   • BREAST LUMPECTOMY W/ NEEDLE LOCALIZATION Left 01/20/2017    RADIATION,HORMONE THERAPY  (DCIS LEFT BREAST)   • COLONOSCOPY     • GASTRECTOMY SLEEVE LAPAROSCOPIC     • TN EXCISION SKIN ABD INFRAUMBILICAL PANNICULECTOMY N/A 04/06/2022    Procedure: LIPECTOMY;  Surgeon: Kwasi Muniz MD;  Location:  MAIN OR;  Service: Plastics   • TN MASTOPEXY Bilateral 10/06/2022    Procedure: MASTOPEXY BREAST;  Surgeon: Kwasi Muniz MD;  Location: [facility] MAIN OR;  Service: Plastics   • TUBAL LIGATION     • WRIST SURGERY Right        Social History     Socioeconomic History   • Marital status: Single     Spouse name: Not on file   • Number of children: Not on file   • Years of education: Not on file   • Highest education level: Not on file   Occupational History   • Not on file   Tobacco Use   • Smoking status: Never   • Smokeless tobacco: Never   • Tobacco comments:     Denies former or current use   Vaping Use   • Vaping Use: Never used   Substance and Sexual Activity   • Alcohol use: Not Currently     Comment: Pt reports former rare wine use - no current use at this time    • Drug use: Never     Comment: Denies any former or current use    • Sexual activity: Not Currently     Partners: Male     Birth control/protection: Female Sterilization     Comment: Denies any chest pain or shortness of breath with activity   Other Topics Concern   • Not on file   Social History Narrative   • Not on file     Social Determinants of Health     Financial Resource Strain: Low Risk  (5/2/2022)    Overall Financial Resource Strain (CARDIA)    • Difficulty of Paying Living Expenses: Not hard at all   Food Insecurity: No Food Insecurity (5/2/2022)    Hunger Vital Sign    • Worried About Running Out of Food in the Last Year: Never true    • Ran Out of Food in the Last Year: Never true   Transportation Needs: No Transportation Needs (5/2/2022)    PRAPARE - Transportation    • Lack of Transportation (Medical): No    • Lack of Transportation (Non-Medical):  No   Physical Activity: Not on file   Stress: Not on file   Social Connections: Not on file   Intimate Partner Violence: Not on file   Housing Stability: Not on file       Family History   Problem Relation Age of Onset   • Hyperlipidemia Mother    • Diabetes Mother    • Hypertension Mother    • Colon cancer Father    • Liver cancer Father    • Hypertension Sister    • Hypertension Brother    • No Known Problems Daughter    • No Known Problems Maternal Grandmother    • No Known Problems Maternal Grandfather    • Skin cancer Paternal Grandmother    • Lymphoma Paternal Grandfather        No Known Allergies      Current Outpatient Medications:   •  acetaminophen (TYLENOL) 500 mg tablet, Take 1 tablet (500 mg total) by mouth every 6 (six) hours as needed for mild pain, Disp: 60 tablet, Rfl: 0  •  amLODIPine (NORVASC) 10 mg tablet, Take 1 tablet (10 mg total) by mouth daily, Disp: 90 tablet, Rfl: 3  •  atorvastatin (LIPITOR) 80 mg tablet, Take 1 tablet (80 mg total) by mouth daily, Disp: 90 tablet, Rfl: 2  •  azelastine (ASTELIN) 0 1 % nasal spray, 1 spray into each nostril 2 (two) times a day Use in each nostril as directed, Disp: 30 mL, Rfl: 1  •  cholecalciferol (VITAMIN D3) 1,000 units tablet, Take 1 tablet (1,000 Units total) by mouth in the morning 4/1/22 pt, Disp: 90 tablet, Rfl: 1  •  dicyclomine (BENTYL) 10 mg capsule, Take 1 capsule (10 mg total) by mouth 4 (four) times a day (before meals and at bedtime), Disp: 30 capsule, Rfl: 1  •  ezetimibe (ZETIA) 10 mg tablet, Take 1 tablet (10 mg total) by mouth daily, Disp: 90 tablet, Rfl: 1  •  famotidine (PEPCID) 20 mg tablet, Take 1 tablet (20 mg total) by mouth 2 (two) times a day, Disp: 30 tablet, Rfl: 0  •  FeroSul 325 (65 Fe) MG tablet, , Disp: , Rfl:   •  ibuprofen (MOTRIN) 600 mg tablet, Take 1 tablet (600 mg total) by mouth every 6 (six) hours as needed for mild pain, Disp: 60 tablet, Rfl: 0  •  lidocaine (LMX) 4 % "cream, Apply topically as needed for mild pain, Disp: 30 g, Rfl: 0  •  loratadine (CLARITIN) 10 mg tablet, Take 1 tablet (10 mg total) by mouth daily, Disp: 30 tablet, Rfl: 0  •  meclizine (ANTIVERT) 25 mg tablet, Take 1 tablet (25 mg total) by mouth 3 (three) times a day as needed for dizziness, Disp: 30 tablet, Rfl: 3  •  metFORMIN (GLUCOPHAGE-XR) 500 mg 24 hr tablet, Take 500 mg by mouth daily with breakfast 4/1/22 Pt reports using daily with breakfast , Disp: , Rfl:   •  metoprolol tartrate (LOPRESSOR) 25 mg tablet, Take 0 5 tablets (12 5 mg total) by mouth every 12 (twelve) hours, Disp: 90 tablet, Rfl: 1  •  metroNIDAZOLE (METROGEL) 0 75 % vaginal gel, Insert 1 application   into the vagina daily at bedtime for 5 days, Disp: 5 g, Rfl: 0  •  omeprazole (PriLOSEC) 40 MG capsule, Take 1 capsule (40 mg total) by mouth daily, Disp: 30 capsule, Rfl: 5  •  ondansetron (ZOFRAN-ODT) 4 mg disintegrating tablet, Take 1 tablet (4 mg total) by mouth every 6 (six) hours as needed for nausea or vomiting, Disp: 20 tablet, Rfl: 0  •  oxyCODONE (Roxicodone) 5 immediate release tablet, Take 1 tablet (5 mg total) by mouth every 6 (six) hours as needed for moderate pain or severe pain Max Daily Amount: 20 mg, Disp: 5 tablet, Rfl: 0  •  sulfamethoxazole-trimethoprim (BACTRIM DS) 800-160 mg per tablet, Take 1 tablet by mouth every 12 (twelve) hours for 7 days, Disp: 14 tablet, Rfl: 0  •  tamsulosin (FLOMAX) 0 4 mg, Take 1 capsule (0 4 mg total) by mouth daily with dinner, Disp: 30 capsule, Rfl: 0  •  omeprazole (PriLOSEC) 40 MG capsule, Take 1 capsule (40 mg total) by mouth 2 (two) times a day before meals for 14 days, Disp: 28 capsule, Rfl: 0    Vitals:    06/08/23 0800   BP: 152/98   Pulse: 72   SpO2: 98%   Weight: 83 9 kg (185 lb)   Height: 5' 4\" (1 626 m)       PHYSICAL EXAMINATION:  Gen: Awake, Alert, NAD  Head/eyes: AT/NC, pupils equal and round, Anicteric  ENT: mmm  Neck: Supple, No elevated JVP, trachea midline  Resp: CTA " bilaterally no w/r/r  CV: RRR +S1, S2, No m/r/g  Abd: Soft, obese, NT/ND + BS  Ext: no LE edema bilaterally  Neuro: Follows commands, moves all extermities  Psych: Appropriate affect, happy mood, pleasant attitude, non-combative  Skin: warm; no rash, erythema or venous stasis changes on exposed skin    --------------------------------------------------------------------------------  TREADMILL STRESS  No results found for this or any previous visit    --------------------------------------------------------------------------------  NUCLEAR STRESS TEST: No results found for this or any previous visit  No results found for this or any previous visit     --------------------------------------------------------------------------------  CATH:  No results found for this or any previous visit   --------------------------------------------------------------------------------  ECHO:   Results for orders placed during the hospital encounter of 19   Echo complete with contrast if indicated    Adiel Curtis [address] [place] [address]  [phone]    Transthoracic Echocardiogram  2D, M-mode, Doppler, and Color Doppler    Study date:  2019    Patient: Nadya Rainey  MR number: [id number]  Account number: [de-identified]  : 1970  Age: 51 years  Gender: Female  Status: Outpatient  Location: [address] [place] [address] [facility] [place]  Height: 63 in  Weight: 186 6 lb  BP: 134/ 86 mmHg    Indications: Ascending aortic aneurysm seen on CT 6/3/2019; proximal ascending aorta not visualized on CT      Diagnoses: I71 2 - Thoracic aortic aneurysm, without rupture    Sonographer:  José Antonio Villagomez BS, RDCS, RVT, RDMS  Interpreting Physician:  Freddie Collier MD  Primary Physician:  Zion Francisco  Referring Physician:  Ana Vanegas PA-C  Group:  [facility]  Cardiology Fellow:  Justina Willett DO    SUMMARY    LEFT VENTRICLE:  Systolic function was normal  Ejection fraction was estimated to be 60 %  Although no diagnostic regional wall motion abnormality was identified, this possibility cannot be completely excluded on the basis of this study  Wall thickness was mildly increased  The changes were consistent with concentric remodeling (increased wall thickness with normal wall mass)  MITRAL VALVE:  There was trace regurgitation  AORTIC VALVE:  There was trace regurgitation  TRICUSPID VALVE:  There was mild regurgitation  Pulmonary artery systolic pressure was within the normal range  AORTA:  The root exhibited normal size  There was mild dilatation of the ascending aorta  The ascending aortic AP dimension was 41 mm  HISTORY: PRIOR HISTORY: Dm, HTN  Ascending aortic aneurysm seen on CT 6/3/2019 done for headache  PROCEDURE: The study was performed in the [address] [facility] [place]  This was a routine study  The transthoracic approach was used  The study included complete 2D imaging, M-mode, complete spectral Doppler, and color Doppler  Images were obtained from the parasternal, apical, subcostal, and suprasternal notch acoustic windows  Image quality was good  LEFT VENTRICLE: Size was normal  Systolic function was normal  Ejection fraction was estimated to be 60 %  Although no diagnostic regional wall motion abnormality was identified, this possibility cannot be completely excluded on the basis  of this study  Wall thickness was mildly increased  The changes were consistent with concentric remodeling (increased wall thickness with normal wall mass)  DOPPLER: Left ventricular diastolic function parameters were normal     RIGHT VENTRICLE: The size was normal  Systolic function was normal  Wall thickness was normal     LEFT ATRIUM: Size was normal     RIGHT ATRIUM: Size was normal     MITRAL VALVE: Valve structure was normal  There was normal leaflet separation  DOPPLER: The transmitral velocity was within the normal range  There was no evidence for stenosis  There was trace regurgitation  AORTIC VALVE: The valve was trileaflet  Leaflets exhibited normal thickness and normal cuspal separation  DOPPLER: Transaortic velocity was within the normal range  There was no evidence for stenosis  There was trace regurgitation  TRICUSPID VALVE: The valve structure was normal  There was normal leaflet separation  DOPPLER: The transtricuspid velocity was within the normal range  There was no evidence for stenosis  There was mild regurgitation  Pulmonary artery  systolic pressure was within the normal range  PULMONIC VALVE: Leaflets exhibited normal thickness, no calcification, and normal cuspal separation  DOPPLER: The transpulmonic velocity was within the normal range  There was no significant regurgitation  PERICARDIUM: There was no pericardial effusion  AORTA: The root exhibited normal size  There was mild dilatation of the ascending aorta  SYSTEMIC VEINS: IVC: The inferior vena cava was normal in size and course  Respirophasic changes were normal     PULMONARY VEINS: DOPPLER: Doppler flow pattern was normal in the pulmonary vein(s)      MEASUREMENT TABLES    2D MEASUREMENTS  Aorta   (Reference normals)  AAo AP diam   41 mm   (--)    SYSTEM MEASUREMENT TABLES    2D  %FS: 34 78 %  Ao Diam: 3 39 cm  EDV(Teich): 73 08 ml  EF(Cube): 72 26 %  EF(Teich): 64 48 %  ESV(Cube): 18 75 ml  ESV(Teich): 25 96 ml  IVSd: 1 11 cm  LA Area: 14 4 cm2  LA Diam: 3 3 cm  LVEDV MOD A4C: 61 33 ml  LVEF MOD A4C: 60 09 %  LVESV MOD A4C: 24 48 ml  LVIDd: 4 07 cm  LVIDs: 2 66 cm  LVLd A4C: 7 98 cm  LVLs A4C: 6 63 cm  LVPWd: 1 1 cm  RA Area: 13 7 cm2  RV Diam : 2 83 cm  SI(Cube): 25 97 ml/m2  SI(Teich): 25 07 ml/m2  SV MOD A4C: 36 85 ml  SV(Cube): 48 83 ml  SV(Teich): 47 13 ml    CW  AV Vmax: 1 46 m/s  AV maxP 53 mmHg  TR Vmax: 2 08 m/s  TR maxP 25 mmHg    MM  TAPSE: 1 81 cm    PW  E': 0 12 m/s  E/E': 6 09  LVOT Vmax: 1 1 m/s  LVOT maxP 83 "mmHg  MV A [place]: 0 69 m/s  MV Dec [place]: 2 83 m/s2  MV DecT: 267 76 ms  MV E [place]: 0 74 m/s  MV E/A Ratio: 1 07    [facility] Accredited Echocardiography Laboratory    Prepared and electronically signed by    Shayla Gan MD  Signed 17-Jun-2019 15:30:15       No results found for this or any previous visit   --------------------------------------------------------------------------------  HOLTER  No results found for this or any previous visit  No results found for this or any previous visit   --------------------------------------------------------------------------------  CAROTIDS  No results found for this or any previous visit    --------------------------------------------------------------------------------  ECGs:  No results found for this visit on 06/08/23  Lab Results   Component Value Date    HCT 40 7 04/14/2023    HGB 13 2 04/14/2023    MCV 87 04/14/2023     04/14/2023    WBC 6 27 04/14/2023      Lab Results   Component Value Date    BUN 14 04/14/2023    CALCIUM 10 0 04/14/2023     04/14/2023    CO2 24 04/14/2023    CREATININE 0 97 04/14/2023    GLUC 118 04/14/2023    K 3 4 (L) 04/14/2023    SODIUM 138 04/14/2023      Lab Results   Component Value Date    HGBA1C 5 5 05/25/2022      No results found for: \"CHOL\"  Lab Results   Component Value Date    HDL 42 (L) 01/16/2023    HDL 53 05/25/2022    HDL 45 (L) 03/03/2022     Lab Results   Component Value Date    LDLCALC 216 (H) 01/16/2023    LDLCALC 207 (H) 05/25/2022    LDLCALC 201 (H) 03/03/2022     Lab Results   Component Value Date    TRIG 161 (H) 01/16/2023    TRIG 133 05/25/2022    TRIG 108 03/03/2022     No results found for: \"CHOLHDL\"   Lab Results   Component Value Date    INR 0 89 09/13/2022    INR 0 98 03/03/2022    INR 0 91 06/19/2019    PROTIME 12 3 09/13/2022    PROTIME 12 6 03/03/2022    PROTIME 12 4 06/19/2019        1   Aneurysm of ascending aorta without rupture [facility])  -     Ambulatory referral to Cardiovascular " Surgery; Future    2  Abnormal ECG    3  Essential hypertension  -     amLODIPine (NORVASC) 10 mg tablet; Take 1 tablet (10 mg total) by mouth daily    4  Dyslipidemia        IMPRESSION:  · Precordial chest pain atypical for angina  · Abnormal ECG  · Exercise stress test is negative for myocardial ischemia, ET 9 min, 91% MPHR, 10 1 METs, resting hypertensive BP, April 2021  · Ascending aortic aneurysm 4 4 cm by CT chest, May 2022  · 4 3 cm by CT chest, October 2020  · 4 5 cm by CT chest, May 2023  · Hypertension  · LVEF 55%, abnormal septal motion, grade 1 diastolic dysfunction, AV sclerosis, trace MR/TR, mild ascending aortic and aortic root dilatation 4 1 cm, April 2021  · Dyslipidemia with probable familial hypercholesterolemia w/  mg/dL, HDL 42 mg/dL,  mg/dL, January 2023  · Prediabetes  · Aortic atherosclerosis by CT chest, May 2022  · ASCVD 2 5%  April 2021  · Left sized breast CA with radiation therapy in remission, 2017  · Obesity    PLAN:  It was a pleasure to see Fidelina Cifuentes in the office today for follow-up CV evaluation  She is here today to review the results of her CT scan of the chest   Her CT scan demonstrated there is been some mild progression of her ascending aortic dilatation now up to 4 5 cm  I have shared with her this news  She has no chest pain concerning for angina and no signs or symptoms of heart failure  She examines to be euvolemic in the office today  Blood pressure remains elevated with systolic pressure in the 632Q on repeat  She is tolerating her current medications without any reported adverse effects  She does admit to some dietary indiscretion with both foods high in salt and cholesterol  She has been able to perform greater than 4 METS on a daily basis without significant exertional symptoms  Based on her clinical presentation, I have the following recommendations:    1    Due to her history of atherosclerosis along with prediabetes, hypertension, recommend the "initiation of [place] for lipid lowering as her LDL is 260 mg/dL despite the use of maximum dose high intensity statin and Zetia  We will have the office see if she is able to have the medication by authorization  Otherwise, we will consider Repatha/Praluent  2   Continue high intensity statin and Zetia  Goal LDL is less than 70 mg/dL  LDL has been trending upward despite both these medication  3   Recommend heart healthy diet low in sodium and carbohydrate  Would recommend a diet including the Mediterranean diet to help with lipid lowering  4   Increase amlodipine to 10 mg daily for improved blood pressure control  Continue metoprolol 25 mg twice daily  5   We will bring her back in 2 weeks for a nursing blood pressure check to reevaluate her blood pressure on the increased dose of amlodipine  6   I will give her a referral for cardiothoracic surgery with her now moderate aortic dilatation for comanagement/screening  7   We will follow-up with her for blood pressure check and then in 6 months to reevaluate her cholesterol  As always, please do not hesitate to call with any questions  Portions of the record may have been created with voice recognition software  Occasional wrong word or \"sound a like\" substitutions may have occurred due to the inherent limitations of voice recognition software  Read the chart carefully and recognize, using context, where substitutions have occurred      Signed: Hazel Chowdary DO, FACC, ALVIN, FACP  "

## 2023-06-05 NOTE — TELEPHONE ENCOUNTER
"Patient's voicemail box is full and cannot leave a message  Voicemail left with emergency contact to have Florencia please contact the office  Patient \"no show\" for CT scheduled for Friday 6/2/2023    "

## 2023-06-05 NOTE — TELEPHONE ENCOUNTER
Pt called I did inform her of the antibiotics  She did  get the antibiotic    She is going to call central scheduling to reschedule the CT

## 2023-06-06 ENCOUNTER — ANNUAL EXAM (OUTPATIENT)
Dept: GYNECOLOGY | Facility: CLINIC | Age: 53
End: 2023-06-06
Payer: COMMERCIAL

## 2023-06-06 VITALS — DIASTOLIC BLOOD PRESSURE: 96 MMHG | SYSTOLIC BLOOD PRESSURE: 140 MMHG

## 2023-06-06 DIAGNOSIS — D05.12 DUCTAL CARCINOMA IN SITU (DCIS) OF LEFT BREAST: ICD-10-CM

## 2023-06-06 DIAGNOSIS — B96.89 BV (BACTERIAL VAGINOSIS): ICD-10-CM

## 2023-06-06 DIAGNOSIS — R30.0 BURNING WITH URINATION: ICD-10-CM

## 2023-06-06 DIAGNOSIS — B37.31 VAGINAL YEAST INFECTION: ICD-10-CM

## 2023-06-06 DIAGNOSIS — Z98.84 STATUS POST LAPAROSCOPIC SLEEVE GASTRECTOMY: ICD-10-CM

## 2023-06-06 DIAGNOSIS — B96.81 HELICOBACTER PYLORI GASTRITIS: ICD-10-CM

## 2023-06-06 DIAGNOSIS — Z98.890 HISTORY OF BILATERAL BREAST REDUCTION SURGERY: ICD-10-CM

## 2023-06-06 DIAGNOSIS — Z98.51 STATUS POST TUBAL LIGATION: ICD-10-CM

## 2023-06-06 DIAGNOSIS — Z86.39 HISTORY OF DIABETES MELLITUS: ICD-10-CM

## 2023-06-06 DIAGNOSIS — E78.2 MIXED HYPERLIPIDEMIA: ICD-10-CM

## 2023-06-06 DIAGNOSIS — Z01.419 WOMEN'S ANNUAL ROUTINE GYNECOLOGICAL EXAMINATION: ICD-10-CM

## 2023-06-06 DIAGNOSIS — N76.0 BV (BACTERIAL VAGINOSIS): ICD-10-CM

## 2023-06-06 DIAGNOSIS — K29.70 HELICOBACTER PYLORI GASTRITIS: ICD-10-CM

## 2023-06-06 LAB
SL AMB  POCT GLUCOSE, UA: NORMAL
SL AMB LEUKOCYTE ESTERASE,UA: NORMAL
SL AMB POCT BILIRUBIN,UA: NORMAL
SL AMB POCT BLOOD,UA: NORMAL
SL AMB POCT CLARITY,UA: CLEAR
SL AMB POCT COLOR,UA: YELLOW
SL AMB POCT KETONES,UA: NORMAL
SL AMB POCT NITRITE,UA: NORMAL
SL AMB POCT PH,UA: 5
SL AMB POCT SPECIFIC GRAVITY,UA: 1.03
SL AMB POCT URINE PROTEIN: NORMAL
SL AMB POCT UROBILINOGEN: 0.2

## 2023-06-06 PROCEDURE — 99396 PREV VISIT EST AGE 40-64: CPT | Performed by: OBSTETRICS & GYNECOLOGY

## 2023-06-06 PROCEDURE — 81002 URINALYSIS NONAUTO W/O SCOPE: CPT | Performed by: OBSTETRICS & GYNECOLOGY

## 2023-06-06 RX ORDER — FERROUS SULFATE 325(65) MG
TABLET ORAL
COMMUNITY
Start: 2023-05-31

## 2023-06-06 RX ORDER — METRONIDAZOLE 7.5 MG/G
1 GEL VAGINAL
Qty: 5 G | Refills: 0 | Status: SHIPPED | OUTPATIENT
Start: 2023-06-06 | End: 2023-06-11

## 2023-06-06 RX ORDER — FLUCONAZOLE 150 MG/1
150 TABLET ORAL ONCE
Qty: 1 TABLET | Refills: 1 | Status: SHIPPED | OUTPATIENT
Start: 2023-06-06 | End: 2023-06-06

## 2023-06-06 NOTE — PROGRESS NOTES
Assessment   Annual well woman exam  Asymptomatic menopausal state  History of ductal carcinoma in situ, left breast  Status postlumpectomy left breast  Status post bilateral breast reduction  Status post tubal ligation  Status post abdominoplasty  History of mixed hyperlipidemia  Status post laparoscopic sleeve gastrectomy  History of a sending aortic aneurysm  Helicobacter pyloric gastritis, on double antibiotics  Bacterial vaginosis  Vaginal irritation        Plan   [employer] vaginal for BV  Fluconazole for vaginal yeast infection  Normal Pap smear in , next Pap due in    All questions answered  Subjective here for annual exam     Em Suazo is a 47 y o  female  4 para 4 who presents for annual exam   She no longer has menstrual cycles, asymptomatic menopausal state  Does complain of vaginal irritation and dysuria  UA CNS obtained  Wet mount positive for clue cells consistent with BV  Vaginal inflammation will be treated with fluconazole  The patient reports that there is not domestic violence in her life  Current contraception: post menopausal status  History of abnormal Pap smear: no  Family history of uterine or ovarian cancer: no  Regular self breast exam: yes  History of abnormal mammogram: no  Family history of breast cancer: no  History of abnormal lipids: no  Menstrual History:  OB History        4    Para   4    Term   4            AB        Living   4       SAB        IAB        Ectopic        Multiple        Live Births   3                Menarche age: 16  Patient's last menstrual period was 12/15/2018 (approximate)  Review of Systems  Pertinent items are noted in HPI        Objective no acute distress   /96 (Patient Position: Sitting, Cuff Size: Standard)   LMP 12/15/2018 (Approximate) Comment: Hx of tubal ligation    General:   alert and oriented, in no acute distress, alert, appears stated age and cooperative   Heart: regular rate and rhythm, S1, S2 normal, no murmur, click, rub or gallop   Lungs: clear to auscultation bilaterally   Abdomen: soft, non-tender, without masses or organomegaly   Vulva: normal, Bartholin's, Urethra, [place]'s normal, female escutcheon   Vagina: normal mucosa, thin grey discharge, wet prep done, clue cells present consistent with bacterial vaginosis  Cervix: anteverted, multiparous appearance, no cervical motion tenderness and no lesions   Uterus: normal size, anteverted, mobile, non-tender, normal shape and consistency   Adnexa: normal adnexa and no mass, fullness, tenderness   Bilateral breast exam in the sitting and supine position with chaperone present, no visible or palpable breast lesions identified  No breast masses noted  No supraclavicular or axillary lymphadenopathy noted  No nipple discharge  Reviewed self-breast exam techniques     Rectal exam,  deferred

## 2023-06-08 ENCOUNTER — OFFICE VISIT (OUTPATIENT)
Dept: CARDIOLOGY CLINIC | Facility: CLINIC | Age: 53
End: 2023-06-08
Payer: COMMERCIAL

## 2023-06-08 VITALS
HEART RATE: 72 BPM | SYSTOLIC BLOOD PRESSURE: 152 MMHG | HEIGHT: 64 IN | DIASTOLIC BLOOD PRESSURE: 98 MMHG | OXYGEN SATURATION: 98 % | WEIGHT: 185 LBS | BODY MASS INDEX: 31.58 KG/M2

## 2023-06-08 DIAGNOSIS — E78.01 FAMILIAL HYPERCHOLESTEROLEMIA: ICD-10-CM

## 2023-06-08 DIAGNOSIS — I10 ESSENTIAL HYPERTENSION: Primary | ICD-10-CM

## 2023-06-08 DIAGNOSIS — E78.5 DYSLIPIDEMIA: ICD-10-CM

## 2023-06-08 DIAGNOSIS — I10 ESSENTIAL HYPERTENSION: ICD-10-CM

## 2023-06-08 DIAGNOSIS — R94.31 ABNORMAL ECG: ICD-10-CM

## 2023-06-08 DIAGNOSIS — I71.21 ANEURYSM OF ASCENDING AORTA WITHOUT RUPTURE (HCC): Primary | ICD-10-CM

## 2023-06-08 PROCEDURE — 99214 OFFICE O/P EST MOD 30 MIN: CPT | Performed by: INTERNAL MEDICINE

## 2023-06-08 RX ORDER — AMLODIPINE BESYLATE 10 MG/1
10 TABLET ORAL DAILY
Qty: 90 TABLET | Refills: 3 | Status: SHIPPED | OUTPATIENT
Start: 2023-06-08 | End: 2023-09-06

## 2023-06-10 ENCOUNTER — HOSPITAL ENCOUNTER (OUTPATIENT)
Dept: CT IMAGING | Facility: HOSPITAL | Age: 53
Discharge: HOME | End: 2023-06-10
Payer: COMMERCIAL

## 2023-06-10 DIAGNOSIS — N20.1 RIGHT URETERAL STONE: ICD-10-CM

## 2023-06-10 PROCEDURE — 74176 CT ABD & PELVIS W/O CONTRAST: CPT

## 2023-06-10 PROCEDURE — G1004 CDSM NDSC: HCPCS

## 2023-06-22 ENCOUNTER — NURSE TRIAGE (OUTPATIENT)
Age: 53
End: 2023-06-22

## 2023-06-22 NOTE — TELEPHONE ENCOUNTER
Regarding: SYMPTOM CALL  ----- Message from Kayla Velazco sent at 6/22/2023  3:52 PM EDT -----  Pt calling stating she finished her medication and upon finishing she is experiencing stomach pain and a lot of bloating  Pt asking if she can be prescribed medication for her bloating

## 2023-06-22 NOTE — TELEPHONE ENCOUNTER
Reason for Disposition  • Second attempt to contact caller AND no contact made  Phone number verified      Protocols used: NO CONTACT OR DUPLICATE CONTACT CALL-ADULT-[place]

## 2023-06-23 ENCOUNTER — TELEPHONE (OUTPATIENT)
Dept: CARDIAC SURGERY | Facility: CLINIC | Age: 53
End: 2023-06-23

## 2023-06-26 ENCOUNTER — HOSPITAL ENCOUNTER (OUTPATIENT)
Dept: MAMMOGRAPHY | Facility: CLINIC | Age: 53
Discharge: HOME | End: 2023-06-26
Payer: COMMERCIAL

## 2023-06-26 DIAGNOSIS — Z12.31 ENCOUNTER FOR SCREENING MAMMOGRAM FOR MALIGNANT NEOPLASM OF BREAST: ICD-10-CM

## 2023-06-26 PROCEDURE — 77063 BREAST TOMOSYNTHESIS BI: CPT

## 2023-06-26 PROCEDURE — 77067 SCR MAMMO BI INCL CAD: CPT

## 2023-07-20 ENCOUNTER — TELEPHONE (OUTPATIENT)
Dept: FAMILY MEDICINE CLINIC | Facility: CLINIC | Age: 53
End: 2023-07-20

## 2023-07-20 NOTE — TELEPHONE ENCOUNTER
07/20/23    first attempt to contact patient. no answer    Could not leave message    Mail Box Full      If pt contact office, please assist pt with rescheduling 08/23/23 appt, pcp won’t be available.

## 2023-07-28 ENCOUNTER — TELEPHONE (OUTPATIENT)
Dept: OTHER | Facility: OTHER | Age: 53
End: 2023-07-28

## 2023-07-28 NOTE — TELEPHONE ENCOUNTER
07/28/23    second % FINAL attempt to contact patient. no answer    Could not leave message     Mail Box Full     Appt Canceled       If pt contact office, please assist pt with rescheduling 08/23/23 appt, pcp won’t be available.        Note: letter to contact office and reschedule appt has been sent

## 2023-07-30 ENCOUNTER — APPOINTMENT (EMERGENCY)
Dept: CT IMAGING | Facility: HOSPITAL | Age: 53
End: 2023-07-30
Payer: COMMERCIAL

## 2023-07-30 ENCOUNTER — HOSPITAL ENCOUNTER (EMERGENCY)
Facility: HOSPITAL | Age: 53
Discharge: HOME | End: 2023-07-30
Attending: EMERGENCY MEDICINE | Admitting: EMERGENCY MEDICINE
Payer: COMMERCIAL

## 2023-07-30 VITALS
HEART RATE: 65 BPM | BODY MASS INDEX: 31.47 KG/M2 | RESPIRATION RATE: 20 BRPM | WEIGHT: 184.3 LBS | DIASTOLIC BLOOD PRESSURE: 77 MMHG | TEMPERATURE: 97.6 F | SYSTOLIC BLOOD PRESSURE: 128 MMHG | OXYGEN SATURATION: 100 % | HEIGHT: 64 IN

## 2023-07-30 DIAGNOSIS — R07.9 CHEST PAIN WITH LOW RISK FOR CARDIAC ETIOLOGY: Primary | ICD-10-CM

## 2023-07-30 LAB
ANION GAP SERPL CALCULATED.3IONS-SCNC: 7 MMOL/L
ATRIAL RATE: 69 BPM
BASOPHILS # BLD AUTO: 0.05 THOUSANDS/ÂΜL (ref 0–0.1)
BASOPHILS NFR BLD AUTO: 1 % (ref 0–1)
BUN SERPL-MCNC: 13 MG/DL (ref 5–25)
CALCIUM SERPL-MCNC: 9.9 MG/DL (ref 8.4–10.2)
CARDIAC TROPONIN I PNL SERPL HS: <2 NG/L
CHLORIDE SERPL-SCNC: 104 MMOL/L (ref 96–108)
CO2 SERPL-SCNC: 28 MMOL/L (ref 21–32)
CREAT SERPL-MCNC: 1.03 MG/DL (ref 0.6–1.3)
EOSINOPHIL # BLD AUTO: 0.06 THOUSAND/ÂΜL (ref 0–0.61)
EOSINOPHIL NFR BLD AUTO: 1 % (ref 0–6)
ERYTHROCYTE [DISTWIDTH] IN BLOOD BY AUTOMATED COUNT: 13 % (ref 11.6–15.1)
GFR SERPL CREATININE-BSD FRML MDRD: 62 ML/MIN/1.73SQ M
GLUCOSE SERPL-MCNC: 104 MG/DL (ref 65–140)
HCT VFR BLD AUTO: 42.4 % (ref 34.8–46.1)
HGB BLD-MCNC: 13.4 G/DL (ref 11.5–15.4)
IMM GRANULOCYTES # BLD AUTO: 0.04 THOUSAND/UL (ref 0–0.2)
IMM GRANULOCYTES NFR BLD AUTO: 1 % (ref 0–2)
LYMPHOCYTES # BLD AUTO: 2.04 THOUSANDS/ÂΜL (ref 0.6–4.47)
LYMPHOCYTES NFR BLD AUTO: 33 % (ref 14–44)
MCH RBC QN AUTO: 28 PG (ref 26.8–34.3)
MCHC RBC AUTO-ENTMCNC: 31.6 G/DL (ref 31.4–37.4)
MCV RBC AUTO: 89 FL (ref 82–98)
MONOCYTES # BLD AUTO: 0.52 THOUSAND/ÂΜL (ref 0.17–1.22)
MONOCYTES NFR BLD AUTO: 8 % (ref 4–12)
NEUTROPHILS # BLD AUTO: 3.46 THOUSANDS/ÂΜL (ref 1.85–7.62)
NEUTS SEG NFR BLD AUTO: 56 % (ref 43–75)
NRBC BLD AUTO-RTO: 0 /100 WBCS
P AXIS: 38 DEGREES
PLATELET # BLD AUTO: 298 THOUSANDS/UL (ref 149–390)
PMV BLD AUTO: 9.5 FL (ref 8.9–12.7)
POTASSIUM SERPL-SCNC: 3.8 MMOL/L (ref 3.5–5.3)
PR INTERVAL: 152 MS
QRS AXIS: 32 DEGREES
QRSD INTERVAL: 90 MS
QT INTERVAL: 410 MS
QTC INTERVAL: 439 MS
RBC # BLD AUTO: 4.78 MILLION/UL (ref 3.81–5.12)
SODIUM SERPL-SCNC: 139 MMOL/L (ref 135–147)
T WAVE AXIS: 28 DEGREES
VENTRICULAR RATE: 69 BPM
WBC # BLD AUTO: 6.17 THOUSAND/UL (ref 4.31–10.16)

## 2023-07-30 PROCEDURE — 99285 EMERGENCY DEPT VISIT HI MDM: CPT | Performed by: EMERGENCY MEDICINE

## 2023-07-30 PROCEDURE — 84484 ASSAY OF TROPONIN QUANT: CPT | Performed by: EMERGENCY MEDICINE

## 2023-07-30 PROCEDURE — 85025 COMPLETE CBC W/AUTO DIFF WBC: CPT | Performed by: EMERGENCY MEDICINE

## 2023-07-30 PROCEDURE — 74174 CTA ABD&PLVS W/CONTRAST: CPT

## 2023-07-30 PROCEDURE — 96361 HYDRATE IV INFUSION ADD-ON: CPT

## 2023-07-30 PROCEDURE — 99285 EMERGENCY DEPT VISIT HI MDM: CPT

## 2023-07-30 PROCEDURE — 80048 BASIC METABOLIC PNL TOTAL CA: CPT | Performed by: EMERGENCY MEDICINE

## 2023-07-30 PROCEDURE — G1004 CDSM NDSC: HCPCS

## 2023-07-30 PROCEDURE — 96360 HYDRATION IV INFUSION INIT: CPT

## 2023-07-30 PROCEDURE — 93005 ELECTROCARDIOGRAM TRACING: CPT

## 2023-07-30 PROCEDURE — 36415 COLL VENOUS BLD VENIPUNCTURE: CPT | Performed by: EMERGENCY MEDICINE

## 2023-07-30 PROCEDURE — 93010 ELECTROCARDIOGRAM REPORT: CPT

## 2023-07-30 PROCEDURE — 71275 CT ANGIOGRAPHY CHEST: CPT

## 2023-07-30 RX ORDER — NAPROXEN 500 MG/1
500 TABLET ORAL EVERY 12 HOURS PRN
Qty: 15 TABLET | Refills: 0 | Status: SHIPPED | OUTPATIENT
Start: 2023-07-30

## 2023-07-30 RX ADMIN — SODIUM CHLORIDE 1000 ML: 0.9 INJECTION, SOLUTION INTRAVENOUS at 10:56

## 2023-07-30 RX ADMIN — IOHEXOL 100 ML: 350 INJECTION, SOLUTION INTRAVENOUS at 11:35

## 2023-07-30 NOTE — ED PROVIDER NOTES
History  Chief Complaint   Patient presents with   • Chest Pain     Midsternal chest pain with left arm tingling starting last night at 250.      61-year-old female with history of upper cholesterolemia, breast cancer currently felt to be in remission since 2017, 4.5 cm ascending aortic aneurysm with most recent CT May 2023 presents to the ED with sharp right-sided chest pain. She states she had this since last night and its been constant. Deep breathing makes it worse. The pain radiates to the right neck but also she is experiencing some tingling sensation in the left arm without weakness. She states the pain does cause her to feel short of breath. No diaphoresis. She did recently see cardiology. She has no known coronary artery disease. She is supposed to follow-up with cardiothoracic surgery regarding monitoring her aneurysm but has not yet made that appointment.       History provided by:  Patient and medical records   used: No    Chest Pain  Pain location:  R chest  Pain quality: sharp    Pain radiates to:  Neck  Pain radiates to the back: no    Pain severity:  Unable to specify  Onset quality:  Gradual  Duration:  12 hours  Timing:  Constant  Progression:  Unchanged  Chronicity:  New  Context: breathing and at rest    Relieved by:  None tried  Worsened by:  Deep breathing  Ineffective treatments:  None tried  Associated symptoms: numbness (Left arm) and shortness of breath    Associated symptoms: no abdominal pain, no altered mental status, no anorexia, no anxiety, no back pain, no claudication, no cough, no diaphoresis, no dizziness, no fatigue, no fever, no headache, no lower extremity edema, no nausea, no near-syncope, no orthopnea, no palpitations, no PND, no syncope, not vomiting and no weakness    Risk factors: aortic disease and high cholesterol    Risk factors: no coronary artery disease, no prior DVT/PE, no smoking and no surgery        Prior to Admission Medications Prescriptions Last Dose Informant Patient Reported? Taking?    FeroSul 325 (65 Fe) MG tablet  Self Yes No   acetaminophen (TYLENOL) 500 mg tablet  Self No No   Sig: Take 1 tablet (500 mg total) by mouth every 6 (six) hours as needed for mild pain   amLODIPine (NORVASC) 10 mg tablet   No No   Sig: Take 1 tablet (10 mg total) by mouth daily   atorvastatin (LIPITOR) 80 mg tablet  Self No No   Sig: Take 1 tablet (80 mg total) by mouth daily   azelastine (ASTELIN) 0.1 % nasal spray  Self No No   Si spray into each nostril 2 (two) times a day Use in each nostril as directed   cholecalciferol (VITAMIN D3) 1,000 units tablet  Self No No   Sig: Take 1 tablet (1,000 Units total) by mouth in the morning 22 pt   dicyclomine (BENTYL) 10 mg capsule  Self No No   Sig: Take 1 capsule (10 mg total) by mouth 4 (four) times a day (before meals and at bedtime)   ezetimibe (ZETIA) 10 mg tablet  Self No No   Sig: Take 1 tablet (10 mg total) by mouth daily   famotidine (PEPCID) 20 mg tablet  Self No No   Sig: Take 1 tablet (20 mg total) by mouth 2 (two) times a day   ibuprofen (MOTRIN) 600 mg tablet  Self No No   Sig: Take 1 tablet (600 mg total) by mouth every 6 (six) hours as needed for mild pain   lidocaine (LMX) 4 % cream  Self No No   Sig: Apply topically as needed for mild pain   loratadine (CLARITIN) 10 mg tablet  Self No No   Sig: Take 1 tablet (10 mg total) by mouth daily   meclizine (ANTIVERT) 25 mg tablet  Self No No   Sig: Take 1 tablet (25 mg total) by mouth 3 (three) times a day as needed for dizziness   metFORMIN (GLUCOPHAGE-XR) 500 mg 24 hr tablet  Self Yes No   Sig: Take 500 mg by mouth daily with breakfast 22 Pt reports using daily with breakfast.   metoprolol tartrate (LOPRESSOR) 25 mg tablet  Self No No   Sig: Take 0.5 tablets (12.5 mg total) by mouth every 12 (twelve) hours   omeprazole (PriLOSEC) 40 MG capsule  Self No No   Sig: Take 1 capsule (40 mg total) by mouth daily   omeprazole (PriLOSEC) 40 MG capsule   No No   Sig: Take 1 capsule (40 mg total) by mouth 2 (two) times a day before meals for 14 days   ondansetron (ZOFRAN-ODT) 4 mg disintegrating tablet  Self No No   Sig: Take 1 tablet (4 mg total) by mouth every 6 (six) hours as needed for nausea or vomiting   oxyCODONE (Roxicodone) 5 immediate release tablet  Self No No   Sig: Take 1 tablet (5 mg total) by mouth every 6 (six) hours as needed for moderate pain or severe pain Max Daily Amount: 20 mg   tamsulosin (FLOMAX) 0.4 mg  Self No No   Sig: Take 1 capsule (0.4 mg total) by mouth daily with dinner      Facility-Administered Medications Last Administration Doses Remaining   Inclisiran Sodium (LEQVIO) subcutaneous injection 284 mg None recorded 1          Past Medical History:   Diagnosis Date   • Anemia     4/1/22 Pt reports hx of anemia - no current issues at this time    • Anesthesia     4/1/22 Pt reports her mother had reaction to anesthesia - reports her mother "didnt wake up for two weeks"    • Aortic aneurysm ([address])    • Breast cancer ([address]) 01/20/2017    left side   • Depression     4/1/22 Hx of depression - resolved per pt at this time    • Diabetes mellitus ([address])    • GERD (gastroesophageal reflux disease)    • History of chemotherapy 2017    chest radiation   • History of COVID-19     4/1/22 Pt reports COVID in 2021   • Hyperlipidemia    • Hypertension    • Left wrist pain 07/22/2019   • Migraine    • Obesity     Post medical management with Phentermine/topiramate    • Prediabetes    • Sleep apnea     Hx of sleep apnea - using CPAP in the past but since gastric surgery no further issues    • Tendinitis     4/1/22 Pt reports right hand tendinitis        Past Surgical History:   Procedure Laterality Date   • ABDOMINOPLASTY N/A 04/06/2022    Procedure: ABDOMINOPLASTY;  Surgeon: Renata Cruz MD;  Location: [address] [place] [address] MAIN OR;  Service: Plastics   • BREAST BIOPSY Left 12/06/2016    stereo   • BREAST LUMPECTOMY W/ NEEDLE LOCALIZATION Left 01/20/2017 RADIATION,HORMONE THERAPY  (DCIS LEFT BREAST)   • COLONOSCOPY     • GASTRECTOMY SLEEVE LAPAROSCOPIC     • MS EXCISION SKIN ABD INFRAUMBILICAL PANNICULECTOMY N/A 04/06/2022    Procedure: LIPECTOMY;  Surgeon: Roxann Hall MD;  Location: [address] [place] [address] OR;  Service: Plastics   • MS MASTOPEXY Bilateral 10/06/2022    Procedure: MASTOPEXY BREAST;  Surgeon: Roxann Hall MD;  Location: [address] [place] [address] OR;  Service: Plastics   • REDUCTION MAMMAPLASTY     • TUBAL LIGATION     • WRIST SURGERY Right        Family History   Problem Relation Age of Onset   • Hyperlipidemia Mother    • Diabetes Mother    • Hypertension Mother    • Colon cancer Father    • Liver cancer Father    • Hypertension Sister    • No Known Problems Sister    • No Known Problems Sister    • No Known Problems Daughter    • No Known Problems Daughter    • No Known Problems Daughter    • No Known Problems Maternal Grandmother    • No Known Problems Maternal Grandfather    • Skin cancer Paternal Grandmother    • Lymphoma Paternal Grandfather    • Hypertension Brother    • No Known Problems Maternal Aunt    • No Known Problems Maternal Aunt    • No Known Problems Maternal Aunt    • No Known Problems Maternal Aunt    • No Known Problems Paternal Aunt    • No Known Problems Paternal Aunt    • No Known Problems Paternal Aunt    • No Known Problems Paternal Aunt    • Breast cancer Neg Hx      I have reviewed and agree with the history as documented.     E-Cigarette/Vaping   • E-Cigarette Use Never User    • Comments Denies any former or current use       E-Cigarette/Vaping Substances     Social History     Tobacco Use   • Smoking status: Never   • Smokeless tobacco: Never   • Tobacco comments:     Denies former or current use   Vaping Use   • Vaping Use: Never used   Substance Use Topics   • Alcohol use: Not Currently     Comment: Pt reports former rare wine use - no current use at this time    • Drug use: Never     Comment: Denies any former or current use        Review of Systems   Constitutional: Negative. Negative for chills, diaphoresis, fatigue and fever. HENT: Negative. Negative for congestion, rhinorrhea and sore throat. Eyes: Negative. Negative for discharge, redness and itching. Respiratory: Positive for shortness of breath. Negative for apnea, cough, chest tightness and wheezing. Cardiovascular: Positive for chest pain. Negative for palpitations, orthopnea, claudication, leg swelling, syncope, PND and near-syncope. Gastrointestinal: Negative. Negative for abdominal pain, anorexia, nausea and vomiting. Endocrine: Negative. Genitourinary: Negative. Negative for flank pain, frequency and urgency. Musculoskeletal: Negative. Negative for back pain. Skin: Negative. Allergic/Immunologic: Negative. Neurological: Positive for numbness (Left arm). Negative for dizziness, tremors, seizures, syncope, facial asymmetry, speech difficulty, weakness, light-headedness and headaches. Hematological: Negative. All other systems reviewed and are negative. Physical Exam  Physical Exam  Vitals and nursing note reviewed. Constitutional:       General: She is awake. She is not in acute distress. Appearance: Normal appearance. She is well-developed and overweight. She is not ill-appearing, toxic-appearing or diaphoretic. HENT:      Head: Normocephalic and atraumatic. Right Ear: External ear normal.      Left Ear: External ear normal.      Nose: Nose normal.      Mouth/Throat:      Mouth: Mucous membranes are moist.      Pharynx: No oropharyngeal exudate. Eyes:      General: No scleral icterus. Right eye: No discharge. Left eye: No discharge. Conjunctiva/sclera: Conjunctivae normal.   Neck:      Thyroid: No thyromegaly. Vascular: No JVD. Trachea: No tracheal deviation. Cardiovascular:      Rate and Rhythm: Normal rate and regular rhythm. Heart sounds: Normal heart sounds. No murmur heard.      No friction rub. No gallop. Pulmonary:      Effort: Pulmonary effort is normal. No respiratory distress. Breath sounds: Normal breath sounds. No stridor. No wheezing, rhonchi or rales. Chest:      Chest wall: No tenderness. Abdominal:      General: Bowel sounds are normal. There is no distension. Palpations: Abdomen is soft. There is no mass. Tenderness: There is no abdominal tenderness. Hernia: No hernia is present. Musculoskeletal:         General: No tenderness or deformity. Normal range of motion. Cervical back: Normal range of motion and neck supple. Right lower leg: No edema. Left lower leg: No edema. Lymphadenopathy:      Cervical: No cervical adenopathy. Skin:     General: Skin is warm and dry. Coloration: Skin is not jaundiced or pale. Findings: No bruising, erythema, lesion or rash. Neurological:      General: No focal deficit present. Mental Status: She is alert and oriented to person, place, and time. Cranial Nerves: No cranial nerve deficit. Sensory: No sensory deficit. Motor: No weakness or abnormal muscle tone. Deep Tendon Reflexes: Reflexes are normal and symmetric. Psychiatric:         Mood and Affect: Mood normal.         Behavior: Behavior is cooperative.          Vital Signs  ED Triage Vitals   Temperature Pulse Respirations Blood Pressure SpO2   07/30/23 1206 07/30/23 1039 07/30/23 1039 07/30/23 1039 07/30/23 1039   97.6 °F (36.4 °C) 90 20 145/90 99 %      Temp Source Heart Rate Source Patient Position - Orthostatic VS BP Location FiO2 (%)   07/30/23 1206 07/30/23 1039 07/30/23 1039 07/30/23 1039 --   Oral Monitor Sitting Right arm       Pain Score       07/30/23 1036       8           Vitals:    07/30/23 1039 07/30/23 1206   BP: 145/90 128/77   Pulse: 90 65   Patient Position - Orthostatic VS: Sitting Lying         Visual Acuity      ED Medications  Medications   sodium chloride 0.9 % bolus 1,000 mL (1,000 mL Intravenous New Bag 7/30/23 1056)   iohexol (OMNIPAQUE) 350 MG/ML injection (SINGLE-DOSE) 100 mL (100 mL Intravenous Given 7/30/23 1135)       Diagnostic Studies  Results Reviewed     Procedure Component Value Units Date/Time    HS Troponin 0hr (reflex protocol) [[id number]]  (Normal) Collected: 07/30/23 1055    Lab Status: Final result Specimen: Blood from Arm, Right Updated: 07/30/23 1128     hs TnI 0hr <2 ng/L     Basic metabolic panel [[id number]] Collected: 07/30/23 1055    Lab Status: Final result Specimen: Blood from Arm, Right Updated: 07/30/23 1121     Sodium 139 mmol/L      Potassium 3.8 mmol/L      Chloride 104 mmol/L      CO2 28 mmol/L      ANION GAP 7 mmol/L      BUN 13 mg/dL      Creatinine 1.03 mg/dL      Glucose 104 mg/dL      Calcium 9.9 mg/dL      eGFR 62 ml/min/1.73sq m     Narrative:      [facility] guidelines for Chronic Kidney Disease (CKD):   •  Stage 1 with normal or high GFR (GFR > 90 mL/min/1.73 square meters)  •  Stage 2 Mild CKD (GFR = 60-89 mL/min/1.73 square meters)  •  Stage 3A Moderate CKD (GFR = 45-59 mL/min/1.73 square meters)  •  Stage 3B Moderate CKD (GFR = 30-44 mL/min/1.73 square meters)  •  Stage 4 Severe CKD (GFR = 15-29 mL/min/1.73 square meters)  •  Stage 5 End Stage CKD (GFR <15 mL/min/1.73 square meters)  Note: GFR calculation is accurate only with a steady state creatinine    CBC and differential [[id number]] Collected: 07/30/23 1055    Lab Status: Final result Specimen: Blood from Arm, Right Updated: 07/30/23 1103     WBC 6.17 Thousand/uL      RBC 4.78 Million/uL      Hemoglobin 13.4 g/dL      Hematocrit 42.4 %      MCV 89 fL      MCH 28.0 pg      MCHC 31.6 g/dL      RDW 13.0 %      MPV 9.5 fL      Platelets 669 Thousands/uL      nRBC 0 /100 WBCs      Neutrophils Relative 56 %      Immat GRANS % 1 %      Lymphocytes Relative 33 %      Monocytes Relative 8 %      Eosinophils Relative 1 %      Basophils Relative 1 %      Neutrophils Absolute 3.46 Thousands/µL Immature Grans Absolute 0.04 Thousand/uL      Lymphocytes Absolute 2.04 Thousands/µL      Monocytes Absolute 0.52 Thousand/µL      Eosinophils Absolute 0.06 Thousand/µL      Basophils Absolute 0.05 Thousands/µL                  CTA dissection protocol chest abdomen pelvis w wo contrast   Final Result by Serena Duran MD (07/30 1323)      Stable 4.5 cm fusiform aneurysm of the ascending thoracic aorta. No aortic dissection is identified. Workstation performed: [id number]                    Procedures  ECG 12 Lead Documentation Only    Date/Time: 7/30/2023 11:08 AM    Performed by: Goldie Leonardo DO  Authorized by: Goldie Leonardo DO    Indications / Diagnosis:  Cp  ECG reviewed by me, the ED Provider: yes    Patient location:  ED  Interpretation:     Interpretation: normal    Rate:     ECG rate:  69    ECG rate assessment: normal    Rhythm:     Rhythm: sinus rhythm    Ectopy:     Ectopy: none    Conduction:     Conduction: normal    ST segments:     ST segments:  Normal  T waves:     T waves: normal               ED Course  ED Course as of 07/30/23 1330   Sun Jul 30, 2023   1128 CBC, BMP within normal limits   1219 hs TnI 0hr: <2  Constant chest pain, Trop less than 2.  will cancel 2 and 4-hour troponins   1328 CTA dissection:Stable 4.5 cm fusiform aneurysm of the ascending thoracic aorta. No aortic dissection is identified. HEART Risk Score    Flowsheet Row Most Recent Value   Heart Score Risk Calculator    History 0 Filed at: 07/30/2023 1220   ECG 0 Filed at: 07/30/2023 1220   Age 1 Filed at: 07/30/2023 1220   Risk Factors 1 Filed at: 07/30/2023 1220   Troponin 0 Filed at: 07/30/2023 1220   HEART Score 2 Filed at: 07/30/2023 1220                        SBIRT 22yo+    Flowsheet Row Most Recent Value   Initial Alcohol Screen: US AUDIT-C     1. How often do you have a drink containing alcohol? 0 Filed at: 07/30/2023 1036   2.  How many drinks containing alcohol do you have on a typical day you are drinking? 0 Filed at: 07/30/2023 1036   3a. Male UNDER 65: How often do you have five or more drinks on one occasion? 0 Filed at: 07/30/2023 1036   3b. FEMALE Any Age, or MALE 65+: How often do you have 4 or more drinks on one occassion? 0 Filed at: 07/30/2023 1036   Audit-C Score 0 Filed at: 07/30/2023 1036   ALESSANDRO: How many times in the past year have you. .. Used an illegal drug or used a prescription medication for non-medical reasons? Never Filed at: 07/30/2023 1036                    Medical Decision Making  71-year-old female presents to the ED with sharp right-sided chest pain just right of the sternum radiating to the right neck. This has been ongoing since last evening and has been constant. She also is experiencing some tingling sensation to the left arm. The pain is worse with deep inspiration. She states she feels short of breath with the pain. No diaphoresis. No known cardiac disease. She does have an ascending aortic aneurysm last measured at 4.5 cm in May 2023. On exam she is alert in no acute distress. Vital signs are stable. Exam is normal.  Given her history and risk factors will do cardiac work-up but will also CT chest to rule out possibility of aneurysmal rupture, leaking or aortic dissection. PE would also be in the differential.    Amount and/or Complexity of Data Reviewed  Labs: ordered. Decision-making details documented in ED Course. Radiology: ordered. ECG/medicine tests: ordered and independent interpretation performed. Details: Normal sinus rhythm rate 69. Normal conduction. Normal ST-T waves. No ectopy. No STEMI.           Disposition  Final diagnoses:   Chest pain with low risk for cardiac etiology     Time reflects when diagnosis was documented in both MDM as applicable and the Disposition within this note     Time User Action Codes Description Comment    7/30/2023  1:29 PM Yarelis Bryant Add [R07.9] Chest pain with low risk for cardiac etiology       ED Disposition     ED Disposition   Discharge    Condition   Good    Date/Time   Sun Jul 30, 2023  1:29 PM    Comment   Nic Lewis discharge to home/self care. Follow-up Information     Follow up With Specialties Details Why Contact Milo Mondragon MD Family Medicine Schedule an appointment as soon as possible for a visit in 2 days If symptoms worsen [phone] [address]            Patient's Medications   Discharge Prescriptions    NAPROXEN (NAPROSYN) 500 MG TABLET    Take 1 tablet (500 mg total) by mouth every 12 (twelve) hours as needed for mild pain or moderate pain       Start Date: 7/30/2023 End Date: --       Order Dose: 500 mg       Quantity: 15 tablet    Refills: 0       No discharge procedures on file.     PDMP Review       Value Time User    PDMP Reviewed  Yes 5/25/2023  4:16 PM Danitza Urban, [address] [place] [address]          ED Provider  Electronically Signed by           Zina Sherwood DO  07/30/23 [address] [place], [place]  07/30/23 0768

## 2023-08-09 ENCOUNTER — OFFICE VISIT (OUTPATIENT)
Dept: GASTROENTEROLOGY | Facility: MEDICAL CENTER | Age: 53
End: 2023-08-09
Payer: COMMERCIAL

## 2023-08-09 VITALS
BODY MASS INDEX: 32.99 KG/M2 | HEART RATE: 95 BPM | DIASTOLIC BLOOD PRESSURE: 88 MMHG | TEMPERATURE: 99 F | SYSTOLIC BLOOD PRESSURE: 126 MMHG | WEIGHT: 192.2 LBS

## 2023-08-09 DIAGNOSIS — K76.0 FATTY LIVER: ICD-10-CM

## 2023-08-09 DIAGNOSIS — A04.8 H. PYLORI INFECTION: Primary | ICD-10-CM

## 2023-08-09 PROCEDURE — 99213 OFFICE O/P EST LOW 20 MIN: CPT | Performed by: NURSE PRACTITIONER

## 2023-08-09 NOTE — PROGRESS NOTES
[facility] - Outpatient Follow-up Note  Meredith Trejo 47 y.o. female MRN: [id number]  Encounter: [id number]          ASSESSMENT AND PLAN:      1.  H. pylori infection    EGD was done 4/20/2023. Prior sleeve gastrectomy, possible small hiatal hernia. Biopsies were positive for H. pylori. Patient was treated with quad therapy. She completed all of treatment. Denies missing any doses. She completed therapy about 1 month ago. Currently taking omeprazole 40 mg twice daily and Pepcid 20 mg twice daily. She reports she is feeling better since she completed the antibiotics but still has occasional epigastric discomfort after eating. Denies any nausea or vomiting. BMs are brown and formed daily. Denies any melena or hematochezia. No weight loss. -Stool for H. pylori off PPI for 2 weeks  -Will contact patient with results  -Follow-up in office in 2 to 3 months  -Continue antireflux diet        ______________________________________________________________________    SUBJECTIVE: 43-year-old female here for follow-up. Last seen by Dr. HELLER/79/26 for periumbilical pain and nausea and vomiting. Patient was started on omeprazole 40 mg daily. EGD was recommended. EGD was done 4/20/2023. Prior sleeve gastrectomy, possible small hiatal hernia. Biopsies were positive for H. pylori. Patient was treated with quad therapy. She completed all of treatment. Denies missing any doses. She completed therapy about 1 month ago. Currently taking omeprazole 40 mg twice daily and Pepcid 20 mg twice daily. She reports she is feeling better since she completed the antibiotics but still has occasional epigastric discomfort after eating. BMs are brown and formed daily. Denies any melena or hematochezia. No weight loss. Prior EGD/colonoscopy   EGD-4/20/2023-prior sleeve gastrectomy, possible small hiatal biopsies positive for H. pylori. REVIEW OF SYSTEMS IS OTHERWISE NEGATIVE.   10 point review of systems negative other than per HPI      Historical Information   Past Medical History:   Diagnosis Date   • Anemia     4/1/22 Pt reports hx of anemia - no current issues at this time    • Anesthesia     4/1/22 Pt reports her mother had reaction to anesthesia - reports her mother "didnt wake up for two weeks"    • Aortic aneurysm [facility])    • Breast cancer ([address]) 01/20/2017    left side   • Depression     4/1/22 Hx of depression - resolved per pt at this time    • Diabetes mellitus ([address])    • GERD (gastroesophageal reflux disease)    • History of chemotherapy 2017    chest radiation   • History of COVID-19     4/1/22 Pt reports COVID in 2021   • Hyperlipidemia    • Hypertension    • Left wrist pain 07/22/2019   • Migraine    • Obesity     Post medical management with Phentermine/topiramate    • Prediabetes    • Sleep apnea     Hx of sleep apnea - using CPAP in the past but since gastric surgery no further issues    • Tendinitis     4/1/22 Pt reports right hand tendinitis      Past Surgical History:   Procedure Laterality Date   • ABDOMINOPLASTY N/A 04/06/2022    Procedure: ABDOMINOPLASTY;  Surgeon: Pal Chavez MD;  [facility] [address] [place] [place] [address];  Service: Plastics   • BREAST BIOPSY Left 12/06/2016    stereo   • BREAST LUMPECTOMY W/ NEEDLE LOCALIZATION Left 01/20/2017    RADIATION,HORMONE THERAPY  (DCIS LEFT BREAST)   • COLONOSCOPY     • GASTRECTOMY SLEEVE LAPAROSCOPIC     • ND EXCISION SKIN ABD INFRAUMBILICAL PANNICULECTOMY N/A 04/06/2022    Procedure: LIPECTOMY;  Surgeon: Pal Chavez MD;  [facility] [address] [place] [place] [address];  Service: Plastics   • ND MASTOPEXY Bilateral 10/06/2022    Procedure: MASTOPEXY BREAST;  Surgeon: Pal Chavez MD;  [facility] [address] [place] [place] [address];  Service: Plastics   • REDUCTION MAMMAPLASTY     • TUBAL LIGATION     • WRIST SURGERY Right      Social History   Social History     Substance and Sexual Activity   Alcohol Use Yes    Comment: occasionally     Social History     Substance and Sexual Activity Drug Use Never    Comment: Denies any former or current use      Social History     Tobacco Use   Smoking Status Never   Smokeless Tobacco Never   Tobacco Comments    Denies former or current use     Family History   Problem Relation Age of Onset   • Hyperlipidemia Mother    • Diabetes Mother    • Hypertension Mother    • Colon cancer Father    • Liver cancer Father    • Hypertension Sister    • No Known Problems Sister    • No Known Problems Sister    • No Known Problems Daughter    • No Known Problems Daughter    • No Known Problems Daughter    • No Known Problems Maternal Grandmother    • No Known Problems Maternal Grandfather    • Skin cancer Paternal Grandmother    • Lymphoma Paternal Grandfather    • Hypertension Brother    • No Known Problems Maternal Aunt    • No Known Problems Maternal Aunt    • No Known Problems Maternal Aunt    • No Known Problems Maternal Aunt    • No Known Problems Paternal Aunt    • No Known Problems Paternal Aunt    • No Known Problems Paternal Aunt    • No Known Problems Paternal Aunt    • Breast cancer Neg Hx        Meds/Allergies       Current Outpatient Medications:   •  acetaminophen (TYLENOL) 500 mg tablet  •  amLODIPine (NORVASC) 10 mg tablet  •  cholecalciferol (VITAMIN D3) 1,000 units tablet  •  dicyclomine (BENTYL) 10 mg capsule  •  ezetimibe (ZETIA) 10 mg tablet  •  FeroSul 325 (65 Fe) MG tablet  •  lidocaine (LMX) 4 % cream  •  loratadine (CLARITIN) 10 mg tablet  •  meclizine (ANTIVERT) 25 mg tablet  •  metFORMIN (GLUCOPHAGE-XR) 500 mg 24 hr tablet  •  naproxen (NAPROSYN) 500 mg tablet  •  omeprazole (PriLOSEC) 40 MG capsule  •  ondansetron (ZOFRAN-ODT) 4 mg disintegrating tablet  •  oxyCODONE (Roxicodone) 5 immediate release tablet  •  atorvastatin (LIPITOR) 80 mg tablet  •  azelastine (ASTELIN) 0.1 % nasal spray  •  famotidine (PEPCID) 20 mg tablet  •  metoprolol tartrate (LOPRESSOR) 25 mg tablet  •  omeprazole (PriLOSEC) 40 MG capsule  •  tamsulosin (FLOMAX) 0.4 mg    Current Facility-Administered Medications:   •  Inclisiran Sodium (LEQVIO) subcutaneous injection 284 mg, 284 mg, Subcutaneous, Once    No Known Allergies        Objective     Blood pressure 126/88, pulse 95, temperature 99 °F (37.2 °C), temperature source Tympanic, weight 87.2 kg (192 lb 3.2 oz), last menstrual period 12/15/2018, not currently breastfeeding. Body mass index is 32.99 kg/m². PHYSICAL EXAM:      General Appearance:   Alert, cooperative, no distress   HEENT:   Normocephalic, atraumatic, anicteric. Neck:  Supple, symmetrical, trachea midline   Lungs:   Clear to auscultation bilaterally; no rales, rhonchi or wheezing; respirations unlabored    Heart[de-identified]   Regular rate and rhythm; no murmur, rub, or gallop. Abdomen:   Soft, non-tender, non-distended; normal bowel sounds; no masses, no organomegaly    Genitalia:   Deferred    Rectal:   Deferred    Extremities:  No cyanosis, clubbing or edema    Pulses:  2+ and symmetric    Skin:  No jaundice, rashes, or lesions    Lymph nodes:  No palpable cervical lymphadenopathy        Lab Results:   No visits with results within 1 Day(s) from this visit.    Latest known visit with results is:   Admission on 07/30/2023, Discharged on 07/30/2023   Component Date Value   • WBC 07/30/2023 6.17    • RBC 07/30/2023 4.78    • Hemoglobin 07/30/2023 13.4    • Hematocrit 07/30/2023 42.4    • MCV 07/30/2023 89    • MCH 07/30/2023 28.0    • MCHC 07/30/2023 31.6    • RDW 07/30/2023 13.0    • MPV 07/30/2023 9.5    • Platelets 62/01/6279 298    • nRBC 07/30/2023 0    • Neutrophils Relative 07/30/2023 56    • Immat GRANS % 07/30/2023 1    • Lymphocytes Relative 07/30/2023 33    • Monocytes Relative 07/30/2023 8    • Eosinophils Relative 07/30/2023 1    • Basophils Relative 07/30/2023 1    • Neutrophils Absolute 07/30/2023 3.46    • Immature Grans Absolute 07/30/2023 0.04    • Lymphocytes Absolute 07/30/2023 2.04    • Monocytes Absolute 07/30/2023 0.52    • Eosinophils Absolute 07/30/2023 0.06    • Basophils Absolute 07/30/2023 0.05    • Sodium 07/30/2023 139    • Potassium 07/30/2023 3.8    • Chloride 07/30/2023 104    • CO2 07/30/2023 28    • ANION GAP 07/30/2023 7    • BUN 07/30/2023 13    • Creatinine 07/30/2023 1.03    • Glucose 07/30/2023 104    • Calcium 07/30/2023 9.9    • eGFR 07/30/2023 62    • hs TnI 0hr 07/30/2023 <2    • Ventricular Rate 07/30/2023 69    • Atrial Rate 07/30/2023 69    • WV Interval 07/30/2023 152    • QRSD Interval 07/30/2023 90    • QT Interval 07/30/2023 410    • QTC Interval 07/30/2023 439    • P Axis 07/30/2023 38    • QRS Axis 07/30/2023 32    • T Wave Axis 07/30/2023 28          Radiology Results:   CTA dissection protocol chest abdomen pelvis w wo contrast    Result Date: 7/30/2023  Narrative: CTA - CHEST, ABDOMEN AND PELVIS - WITHOUT AND WITH IV CONTRAST INDICATION:   History of ascending aortic aneurysm now with sharp chest pain and numbness to left arm. COMPARISON: 4/14/2023, 5/25/2023 and 6/10/2023 TECHNIQUE: CT examination of the chest, abdomen and pelvis was performed both prior to and after the administration of intravenous contrast.  The noncontrast portion of this examination was performed utilizing low radiation dose technique. Thin section  angiographic arterial phase post contrast technique was used in order to evaluate for aortic dissection. 3D reformatted images and volume rendering were performed on an independent workstation. Additionally, axial, sagittal, and coronal 2D reformatted  images were created from the source data and submitted for interpretation. Radiation dose length product (DLP) for this visit:  1154 mGy-cm . This examination, like all CT scans performed in the [facility], was performed utilizing techniques to minimize radiation dose exposure, including the use of iterative reconstruction and automated exposure control.  IV Contrast:  100 mL of iohexol (OMNIPAQUE) Enteric Contrast:  Enteric contrast was not administered. FINDINGS: AORTA: There is a stable 4.5 cm fusiform aneurysm of the ascending thoracic aorta. No aortic dissection is identified. The celiac, superior mesenteric and inferior mesenteric arteries are patent. There is a dual left renal artery supply. CHEST LUNGS: Calcified granulomas. No tracheal or endobronchial lesion. PLEURA:  Unremarkable. HEART/PULMONARY ARTERIAL TREE:  Unremarkable for patient's age. MEDIASTINUM AND JUAN A: Calcified left hilar and mediastinal lymph nodes. No adenopathy. CHEST WALL AND LOWER NECK:   Unremarkable. ABDOMEN LIVER/BILIARY TREE: Liver is diffusely decreased in density consistent with fatty change. No CT evidence of suspicious hepatic mass. Normal hepatic contours. No biliary dilatation. GALLBLADDER:  No calcified gallstones. No pericholecystic inflammatory change. SPLEEN:  Unremarkable. PANCREAS:  Unremarkable. ADRENAL GLANDS:  Unremarkable. KIDNEYS/URETERS: Punctate nonobstructing right renal calculus. No hydronephrosis. STOMACH AND BOWEL: Gastric sleeve surgery. No bowel obstruction. APPENDIX:  No findings to suggest appendicitis. ABDOMINOPELVIC CAVITY:  No ascites or free intraperitoneal air. No lymphadenopathy. PELVIS REPRODUCTIVE ORGANS:  Unremarkable for patient's age. URINARY BLADDER:  Unremarkable. ABDOMINAL WALL/INGUINAL REGIONS:  Unremarkable. OSSEOUS STRUCTURES:  No acute fracture or destructive osseous lesion. Impression: Stable 4.5 cm fusiform aneurysm of the ascending thoracic aorta. No aortic dissection is identified.  Workstation performed: [id number]

## 2023-08-10 ENCOUNTER — OFFICE VISIT (OUTPATIENT)
Dept: CARDIAC SURGERY | Facility: CLINIC | Age: 53
End: 2023-08-10
Payer: COMMERCIAL

## 2023-08-10 VITALS
HEART RATE: 86 BPM | WEIGHT: 185 LBS | OXYGEN SATURATION: 98 % | HEIGHT: 64 IN | SYSTOLIC BLOOD PRESSURE: 135 MMHG | BODY MASS INDEX: 31.58 KG/M2 | DIASTOLIC BLOOD PRESSURE: 92 MMHG

## 2023-08-10 DIAGNOSIS — I71.21 ANEURYSM OF ASCENDING AORTA WITHOUT RUPTURE (HCC): ICD-10-CM

## 2023-08-10 PROCEDURE — 99214 OFFICE O/P EST MOD 30 MIN: CPT | Performed by: THORACIC SURGERY (CARDIOTHORACIC VASCULAR SURGERY)

## 2023-08-10 NOTE — LETTER
August 10, 2023     Zahra[address] Daniels[address]  [address] [place] [address]    Patient: Brenda Castro   YOB: 1970   Date of Visit: 8/10/2023       Dear Dr. Malika Craig: Thank you for referring Brenda Castro to me for evaluation. Below are my notes for this consultation. If you have questions, please do not hesitate to call me. I look forward to following your patient along with you. Sincerely,        Chery Higginbotham MD        CC: MD Eron Dempsey PA-C  8/10/2023 11:56 AM  Attested  Aortic Aneurysm Surveillance - Cardiothoracic Surgery   Brenda Castro 47 y.o. female MRN: [id number]    Physician Requesting Consult: No att. providers found     Reason for Consult / Principal Problem: Ascending aortic aneurysm    History of Present Illness: Brenda Castro is a 47y.o. year old female who presents today for surgical consultation for ascending aortic aneurysm. She has been followed in our office intermittently since 2019 for an ascending aortic aneurysm measuring 4.5 cm. She was last seen in 2020. She has had several ED visits this year for chest pain, palpitations and shortness of breath, and has had a CT performed each time, in April, May and July. Her CTA from July demonstrated a 4.5 cm aneurysm, stable since her last visit 3 years ago. Patient's PMHx is significant for HTN, obesity (s/p gastric sleeve in 2018), DM Type 2, HLD, left sided breast cancer with radiation treated in 2017. She follows with Dr. Belem Lopez of cardiology for her HTN, and takes amlodipine. FH significant for her mom who has CAD, and her sister, who she reports had a CABG last year. Upon interview today, patient reports intermittent chest pain and dizziness. No known family history of aneurysms or dissections. She reports that she is active, and that she works in a factory packing pills. She does not do any significant heavy lifting.  She does not check her blood pressure at home.      Past Medical History:  Past Medical History:   Diagnosis Date   • Anemia     4/1/22 Pt reports hx of anemia - no current issues at this time    • Anesthesia     4/1/22 Pt reports her mother had reaction to anesthesia - reports her mother "didnt wake up for two weeks"    • Aortic aneurysm [facility])    • Breast cancer ([address]) 01/20/2017    left side   • Depression     4/1/22 Hx of depression - resolved per pt at this time    • Diabetes mellitus ([address])    • GERD (gastroesophageal reflux disease)    • History of chemotherapy 2017    chest radiation   • History of COVID-19     4/1/22 Pt reports COVID in 2021   • Hyperlipidemia    • Hypertension    • Left wrist pain 07/22/2019   • Migraine    • Obesity     Post medical management with Phentermine/topiramate    • Prediabetes    • Sleep apnea     Hx of sleep apnea - using CPAP in the past but since gastric surgery no further issues    • Tendinitis     4/1/22 Pt reports right hand tendinitis          Past Surgical History:   Past Surgical History:   Procedure Laterality Date   • ABDOMINOPLASTY N/A 04/06/2022    Procedure: ABDOMINOPLASTY;  Surgeon: Kristian Barnard MD;  Location: [address] [place] [address] OR;  Service: Plastics   • BREAST BIOPSY Left 12/06/2016    stereo   • BREAST LUMPECTOMY W/ NEEDLE LOCALIZATION Left 01/20/2017    RADIATION,HORMONE THERAPY  (DCIS LEFT BREAST)   • COLONOSCOPY     • GASTRECTOMY SLEEVE LAPAROSCOPIC     • LA EXCISION SKIN ABD INFRAUMBILICAL PANNICULECTOMY N/A 04/06/2022    Procedure: LIPECTOMY;  Surgeon: Kristian Barnard MD;  Location: [address] [place] [address] OR;  Service: Plastics   • LA MASTOPEXY Bilateral 10/06/2022    Procedure: MASTOPEXY BREAST;  Surgeon: Kristian Barnard MD;  [facility] [address] [place] [place] [address];  Service: Plastics   • REDUCTION MAMMAPLASTY     • TUBAL LIGATION     • WRIST SURGERY Right          Family History:  Family History   Problem Relation Age of Onset   • Hyperlipidemia Mother    • Diabetes Mother    • Hypertension Mother    • Colon cancer Father    • Liver cancer Father    • Hypertension Sister    • No Known Problems Sister    • No Known Problems Sister    • No Known Problems Daughter    • No Known Problems Daughter    • No Known Problems Daughter    • No Known Problems Maternal Grandmother    • No Known Problems Maternal Grandfather    • Skin cancer Paternal Grandmother    • Lymphoma Paternal Grandfather    • Hypertension Brother    • No Known Problems Maternal Aunt    • No Known Problems Maternal Aunt    • No Known Problems Maternal Aunt    • No Known Problems Maternal Aunt    • No Known Problems Paternal Aunt    • No Known Problems Paternal Aunt    • No Known Problems Paternal Aunt    • No Known Problems Paternal Aunt    • Breast cancer Neg Hx          Social History:    Social History     Substance and Sexual Activity   Alcohol Use Yes    Comment: occasionally     Social History     Substance and Sexual Activity   Drug Use Never    Comment: Denies any former or current use      Social History     Tobacco Use   Smoking Status Never   Smokeless Tobacco Never   Tobacco Comments    Denies former or current use       Marital Status: [unfilled]    Home Medications:   Prior to Admission medications    Medication Sig Start Date End Date Taking?  Authorizing Provider   acetaminophen (TYLENOL) 500 mg tablet Take 1 tablet (500 mg total) by mouth every 6 (six) hours as needed for mild pain 4/14/23  Yes Evelyn Armstrong PA-C   amLODIPine (NORVASC) 10 mg tablet Take 1 tablet (10 mg total) by mouth daily 6/8/23 9/6/23 Yes Anjel Richard DO   atorvastatin (LIPITOR) 80 mg tablet Take 1 tablet (80 mg total) by mouth daily 10/10/22 8/10/23 Yes Marleni Quiñones MD   cholecalciferol (VITAMIN D3) 1,000 units tablet Take 1 tablet (1,000 Units total) by mouth in the morning 4/1/22 pt 5/23/23  Yes Elaine Rod MD   ezetimibe (ZETIA) 10 mg tablet Take 1 tablet (10 mg total) by mouth daily 5/23/23  Yes Elaine Rod MD   famotidine (PEPCID) 20 mg tablet Take 1 tablet (20 mg total) by mouth 2 (two) times a day  Patient taking differently: Take 20 mg by mouth 2 (two) times a day Takes as needed 2/12/23  Yes Jaja [facility], DO   FeroSul 325 (65 Fe) MG tablet  5/31/23  Yes Historical Provider, MD   loratadine (CLARITIN) 10 mg tablet Take 1 tablet (10 mg total) by mouth daily  Patient taking differently: Take 10 mg by mouth daily prn 1/11/23  Yes Amie Dance, MD   meclizine (ANTIVERT) 25 mg tablet Take 1 tablet (25 mg total) by mouth 3 (three) times a day as needed for dizziness 2/8/23  Yes Amie Dance, MD   metFORMIN (GLUCOPHAGE-XR) 500 mg 24 hr tablet Take 500 mg by mouth daily with breakfast 4/1/22 Pt reports using daily with breakfast.  Patient states she takes it sometimes.  6/10/21  Yes Historical Provider, MD   naproxen (NAPROSYN) 500 mg tablet Take 1 tablet (500 mg total) by mouth every 12 (twelve) hours as needed for mild pain or moderate pain 7/30/23  Yes Jeannette Archuleta DO   omeprazole (PriLOSEC) 40 MG capsule Take 1 capsule (40 mg total) by mouth daily 4/19/23  Yes Linda Sethi MD   ondansetron (ZOFRAN-ODT) 4 mg disintegrating tablet Take 1 tablet (4 mg total) by mouth every 6 (six) hours as needed for nausea or vomiting 4/14/23  Yes Cathy Hammonds PA-C   azelastine (ASTELIN) 0.1 % nasal spray 1 spray into each nostril 2 (two) times a day Use in each nostril as directed  Patient not taking: Reported on 8/9/2023 1/11/23   Amie Dance, MD   dicyclomine (BENTYL) 10 mg capsule Take 1 capsule (10 mg total) by mouth 4 (four) times a day (before meals and at bedtime)  Patient not taking: Reported on 8/10/2023 2/8/23   Amie Dance, MD   lidocaine (LMX) 4 % cream Apply topically as needed for mild pain  Patient not taking: Reported on 8/10/2023 1/2/22   Marielena Gentile PA-C   metoprolol tartrate (LOPRESSOR) 25 mg tablet Take 0.5 tablets (12.5 mg total) by mouth every 12 (twelve) hours  Patient not taking: Reported on 8/9/2023 10/24/22   Tri Lyman MD   omeprazole (PriLOSEC) 40 MG capsule Take 1 capsule (40 mg total) by mouth 2 (two) times a day before meals for 14 days 5/15/23 5/29/23  Solis Wilburn MD   oxyCODONE (Roxicodone) 5 immediate release tablet Take 1 tablet (5 mg total) by mouth every 6 (six) hours as needed for moderate pain or severe pain Max Daily Amount: 20 mg  Patient not taking: Reported on 8/10/2023 5/25/23   HEMANT Acosta   tamsulosin (FLOMAX) 0.4 mg Take 1 capsule (0.4 mg total) by mouth daily with dinner  Patient not taking: Reported on 8/9/2023 5/25/23   HEMANT Palacios       Allergies:  No Known Allergies    Review of Systems:     Review of Systems   Constitutional: Negative. HENT: Negative. Eyes: Negative. Respiratory: Negative. Cardiovascular: Positive for chest pain and palpitations. Gastrointestinal: Negative. Endocrine: Negative. Genitourinary: Negative. Musculoskeletal: Negative. Skin: Negative. Allergic/Immunologic: Negative. Neurological: Negative. Hematological: Negative. Psychiatric/Behavioral: Negative. Vital Signs:     Vitals:    08/10/23 1038 08/10/23 1040   BP: 138/89 135/92   BP Location: Left arm Right arm   Patient Position: Sitting Sitting   Cuff Size: Large Large   Pulse: 86    SpO2: 98%    Weight: 83.9 kg (185 lb)    Height: 5' 4" (1.626 m)        Physical Exam:     Physical Exam  Constitutional:       Appearance: Normal appearance. HENT:      Head: Normocephalic and atraumatic. Eyes:      Pupils: Pupils are equal, round, and reactive to light. Cardiovascular:      Rate and Rhythm: Normal rate and regular rhythm. Pulses: Normal pulses. Heart sounds: No murmur heard. Pulmonary:      Effort: Pulmonary effort is normal.      Breath sounds: Normal breath sounds. Abdominal:      General: Abdomen is flat. Palpations: Abdomen is soft.    Musculoskeletal: General: Normal range of motion. Cervical back: Normal range of motion. Skin:     General: Skin is warm and dry. Capillary Refill: Capillary refill takes less than 2 seconds. Neurological:      General: No focal deficit present. Mental Status: She is alert. Lab Results:               Invalid input(s): "LABGLOM"      Lab Results   Component Value Date    HGBA1C 5.5 05/25/2022     Lab Results   Component Value Date    TROPONINI <0.02 10/20/2020       Imaging Studies:     CT Chest: 7/30/23  CTA - CHEST, ABDOMEN AND PELVIS - WITHOUT AND WITH IV CONTRAST     INDICATION:   History of ascending aortic aneurysm now with sharp chest pain and numbness to left arm. COMPARISON: 4/14/2023, 5/25/2023 and 6/10/2023     TECHNIQUE: CT examination of the chest, abdomen and pelvis was performed both prior to and after the administration of intravenous contrast.  The noncontrast portion of this examination was performed utilizing low radiation dose technique. Thin section   angiographic arterial phase post contrast technique was used in order to evaluate for aortic dissection. 3D reformatted images and volume rendering were performed on an independent workstation. Additionally, axial, sagittal, and coronal 2D reformatted   images were created from the source data and submitted for interpretation. Radiation dose length product (DLP) for this visit:  1154 mGy-cm . This examination, like all CT scans performed in the [facility], was performed utilizing techniques to minimize radiation dose exposure, including the use of iterative   reconstruction and automated exposure control. IV Contrast:  100 mL of iohexol (OMNIPAQUE)  Enteric Contrast:  Enteric contrast was not administered. FINDINGS:     AORTA: There is a stable 4.5 cm fusiform aneurysm of the ascending thoracic aorta. No aortic dissection is identified.  The celiac, superior mesenteric and inferior mesenteric arteries are patent. There is a dual left renal artery supply. CHEST     LUNGS: Calcified granulomas. No tracheal or endobronchial lesion. PLEURA:  Unremarkable. HEART/PULMONARY ARTERIAL TREE:  Unremarkable for patient's age. MEDIASTINUM AND JUAN A: Calcified left hilar and mediastinal lymph nodes. No adenopathy. CHEST WALL AND LOWER NECK:   Unremarkable. ABDOMEN     LIVER/BILIARY TREE: Liver is diffusely decreased in density consistent with fatty change. No CT evidence of suspicious hepatic mass. Normal hepatic contours. No biliary dilatation. GALLBLADDER:  No calcified gallstones. No pericholecystic inflammatory change. SPLEEN:  Unremarkable. PANCREAS:  Unremarkable. ADRENAL GLANDS:  Unremarkable. KIDNEYS/URETERS: Punctate nonobstructing right renal calculus. No hydronephrosis. STOMACH AND BOWEL: Gastric sleeve surgery. No bowel obstruction. APPENDIX:  No findings to suggest appendicitis. ABDOMINOPELVIC CAVITY:  No ascites or free intraperitoneal air. No lymphadenopathy. PELVIS     REPRODUCTIVE ORGANS:  Unremarkable for patient's age. URINARY BLADDER:  Unremarkable. ABDOMINAL WALL/INGUINAL REGIONS:  Unremarkable. OSSEOUS STRUCTURES:  No acute fracture or destructive osseous lesion. IMPRESSION:     Stable 4.5 cm fusiform aneurysm of the ascending thoracic aorta. No aortic dissection is identified. Echocardiogram: 4/15/21  1. Normal left ventricular size and systolic function, abnormal septal motion otherwise no regional wall motion abnormality. EF around 55%. Suspected early grade 1 diastolic dysfunction. 2. Normal right ventricular size and systolic function. 3. Normal left and right atrial size. 4. Aortic valve sclerosis with some focal calcification, no aortic valve stenosis or regurgitation. 5. Mitral valve sclerosis, trace mitral valve regurgitation. 6. Trace tricuspid and pulmonic valve regurgitation.   7. No obvious pulmonary hypertension. 8. No pericardial effusion. 9. Dilated aortic root and proximal ascending aorta measuring up to 4.1 cm. Compared to report of previous echocardiogram from 2019 there is overall no significant change. I have personally reviewed pertinent reports. Assessment:  Ascending aortic aneurysm; Ongoing ascending aortic replacement workup     Plan:    Sole Mo has an ascending aorta measuring 45 mm in size at its greatest diameter. As they are asymptomatic, with no family history follow-up monitoring is the treatment plan. Arrangements have been made for future surveillance to be completed with CT chest, without contrast in 2 Years. Sole Mo does not meet the following Ascending aortic aneurysm surgical triggers:    * 4.5 cm or > in the setting of + FH of rupture or dissection  * 5 cm with moderate or worse aortic valve disease  *  5.5 cm regardless of aortic valve function and without genetically associated aortic disease   *  Aortic growth > 4mm / year  *  Bicuspid aortic valve with valve dysfunction enough to meet indications for surgery and concomitant ascending aortic aneurysm 4.5 cm or >  * 4.5 cm or > in setting of existing connective tissue disease of Marfan's syndrome, Nathaly-Danlos syndrome or familiar aneurysms syndrome      Sole Mo was comfortable with our recommendations, and their questions were answered to their satisfaction. Thank you for allowing us to participate in the care of this patient. Aortic Aneurysm Instructions were provided to the patient as follows:    1. No lifting more than 50 pounds  2. Maintain a controlled blood pressure with a goal of 120/80. 3. Follow up in Aortic Clinic as recommended with radiology follow up as instructed  4. Report to the ER or call 911 immediately with the following signs / symptoms: sudden onset of back pain, chest pain or shortness of breath.      The patient recently had a screening colonoscopy in 2021. Therefore GI referral is not indicated at this time. SIGNATURE: Graciela Nesbitt PA-C  DATE: 08/10/23  TIME: 10:51 AM    Attestation signed by Dulce Soto MD at 8/10/2023  1:51 PM:  Patient seen and evaluated with Janae Noyola / JULISA. I agree with the above assessment and plan with the following additions. The patient is a 81-year-old female with history of an asymptomatic ascending aorta aneurysm. She is currently asymptomatic. I personally reviewed her diagnostic images, CTA of the chest on 7/30/2023 shows an ascending aorta with a maximum diameter of 4.5 cm which remains unchanged comparing to studies dating back to 2019. There is no need for intervention at this point. I explained to him the importance of maintaining adequate blood pressure control, avoid tobacco products, avoid sustained straining, she was instructed to go to the emergency department if she were to experience severe chest pain or severe upper back pain. I would like to see her back in 2 years with a CT of the chest.    This note was completed in part utilizing [employer] direct voice recognition software. Grammatical errors, random word insertion, spelling mistakes, and incomplete sentences may be an occasional consequence of the system secondary to software limitations, ambient noise and hardware issues. At the time of dictation, efforts were made to edit, clarify and /or correct errors. Please read the chart carefully and recognize, using context, where substitutions have occurred. If you have any questions or concerns about the context, text or information contained within the body of this dictation, please contact myself, the provider, for further clarification.

## 2023-08-10 NOTE — PROGRESS NOTES
Aortic Aneurysm Surveillance - Cardiothoracic Surgery   Remi Montoya 47 y.o. female MRN: [id number]    Physician Requesting Consult: No att. providers found     Reason for Consult / Principal Problem: Ascending aortic aneurysm    History of Present Illness: Remi Montoya is a 47y.o. year old female who presents today for surgical consultation for ascending aortic aneurysm. She has been followed in our office intermittently since 2019 for an ascending aortic aneurysm measuring 4.5 cm. She was last seen in 2020. She has had several ED visits this year for chest pain, palpitations and shortness of breath, and has had a CT performed each time, in April, May and July. Her CTA from July demonstrated a 4.5 cm aneurysm, stable since her last visit 3 years ago. Patient's PMHx is significant for HTN, obesity (s/p gastric sleeve in 2018), DM Type 2, HLD, left sided breast cancer with radiation treated in 2017. She follows with Dr. Tatiana Quiroz of cardiology for her HTN, and takes amlodipine. FH significant for her mom who has CAD, and her sister, who she reports had a CABG last year. Upon interview today, patient reports intermittent chest pain and dizziness. No known family history of aneurysms or dissections. She reports that she is active, and that she works in a factory packing pills. She does not do any significant heavy lifting. She does not check her blood pressure at home.      Past Medical History:  Past Medical History:   Diagnosis Date   • Anemia     4/1/22 Pt reports hx of anemia - no current issues at this time    • Anesthesia     4/1/22 Pt reports her mother had reaction to anesthesia - reports her mother "didnt wake up for two weeks"    • Aortic aneurysm [facility])    • Breast cancer ([address]) 01/20/2017    left side   • Depression     4/1/22 Hx of depression - resolved per pt at this time    • Diabetes mellitus ([address])    • GERD (gastroesophageal reflux disease)    • History of chemotherapy 2017    chest radiation   • History of COVID-19     4/1/22 Pt reports COVID in 2021   • Hyperlipidemia    • Hypertension    • Left wrist pain 07/22/2019   • Migraine    • Obesity     Post medical management with Phentermine/topiramate    • Prediabetes    • Sleep apnea     Hx of sleep apnea - using CPAP in the past but since gastric surgery no further issues    • Tendinitis     4/1/22 Pt reports right hand tendinitis          Past Surgical History:   Past Surgical History:   Procedure Laterality Date   • ABDOMINOPLASTY N/A 04/06/2022    Procedure: ABDOMINOPLASTY;  Surgeon: Ruthellen Cushing, MD;  Location: [facility] MAIN OR;  Service: Plastics   • BREAST BIOPSY Left 12/06/2016    stereo   • BREAST LUMPECTOMY W/ NEEDLE LOCALIZATION Left 01/20/2017    RADIATION,HORMONE THERAPY  (DCIS LEFT BREAST)   • COLONOSCOPY     • GASTRECTOMY SLEEVE LAPAROSCOPIC     • ID EXCISION SKIN ABD INFRAUMBILICAL PANNICULECTOMY N/A 04/06/2022    Procedure: LIPECTOMY;  Surgeon: Ruthellen Cushing, MD;  Location: [facility] MAIN OR;  Service: Plastics   • ID MASTOPEXY Bilateral 10/06/2022    Procedure: MASTOPEXY BREAST;  Surgeon: Ruthellen Cushing, MD;  Location: [facility] MAIN OR;  Service: Plastics   • REDUCTION MAMMAPLASTY     • TUBAL LIGATION     • WRIST SURGERY Right          Family History:  Family History   Problem Relation Age of Onset   • Hyperlipidemia Mother    • Diabetes Mother    • Hypertension Mother    • Colon cancer Father    • Liver cancer Father    • Hypertension Sister    • No Known Problems Sister    • No Known Problems Sister    • No Known Problems Daughter    • No Known Problems Daughter    • No Known Problems Daughter    • No Known Problems Maternal Grandmother    • No Known Problems Maternal Grandfather    • Skin cancer Paternal Grandmother    • Lymphoma Paternal Grandfather    • Hypertension Brother    • No Known Problems Maternal Aunt    • No Known Problems Maternal Aunt    • No Known Problems Maternal Aunt    • No Known Problems Maternal Aunt    • No Known Problems Paternal Aunt    • No Known Problems Paternal Aunt    • No Known Problems Paternal Aunt    • No Known Problems Paternal Aunt    • Breast cancer Neg Hx          Social History:    Social History     Substance and Sexual Activity   Alcohol Use Yes    Comment: occasionally     Social History     Substance and Sexual Activity   Drug Use Never    Comment: Denies any former or current use      Social History     Tobacco Use   Smoking Status Never   Smokeless Tobacco Never   Tobacco Comments    Denies former or current use       Marital Status: [unfilled]    Home Medications:   Prior to Admission medications    Medication Sig Start Date End Date Taking?  Authorizing Provider   acetaminophen (TYLENOL) 500 mg tablet Take 1 tablet (500 mg total) by mouth every 6 (six) hours as needed for mild pain 4/14/23  Yes Holly Harden PA-C   amLODIPine (NORVASC) 10 mg tablet Take 1 tablet (10 mg total) by mouth daily 6/8/23 9/6/23 Yes Kailyn Thomas DO   atorvastatin (LIPITOR) 80 mg tablet Take 1 tablet (80 mg total) by mouth daily 10/10/22 8/10/23 Yes Jose Mercer MD   cholecalciferol (VITAMIN D3) 1,000 units tablet Take 1 tablet (1,000 Units total) by mouth in the morning 4/1/22 pt 5/23/23  Yes Eduin Mendoza MD   ezetimibe (ZETIA) 10 mg tablet Take 1 tablet (10 mg total) by mouth daily 5/23/23  Yes Eduin Mendoza MD   famotidine (PEPCID) 20 mg tablet Take 1 tablet (20 mg total) by mouth 2 (two) times a day  Patient taking differently: Take 20 mg by mouth 2 (two) times a day Takes as needed 2/12/23  Yes [facility], DO   FeroSul 325 (65 Fe) MG tablet  5/31/23  Yes Historical Provider, MD   loratadine (CLARITIN) 10 mg tablet Take 1 tablet (10 mg total) by mouth daily  Patient taking differently: Take 10 mg by mouth daily prn 1/11/23  Yes Eduin Mendoza MD   meclizine (ANTIVERT) 25 mg tablet Take 1 tablet (25 mg total) by mouth 3 (three) times a day as needed for dizziness 2/8/23  Yes Evangelina Moore MD   metFORMIN (GLUCOPHAGE-XR) 500 mg 24 hr tablet Take 500 mg by mouth daily with breakfast 4/1/22 Pt reports using daily with breakfast.  Patient states she takes it sometimes.  6/10/21  Yes Venus Altman MD   naproxen (NAPROSYN) 500 mg tablet Take 1 tablet (500 mg total) by mouth every 12 (twelve) hours as needed for mild pain or moderate pain 7/30/23  Yes Jose Hughes DO   omeprazole (PriLOSEC) 40 MG capsule Take 1 capsule (40 mg total) by mouth daily 4/19/23  Yes Isabella Vasquez MD   ondansetron (ZOFRAN-ODT) 4 mg disintegrating tablet Take 1 tablet (4 mg total) by mouth every 6 (six) hours as needed for nausea or vomiting 4/14/23  Yes Danielle Ayala PA-C   azelastine (ASTELIN) 0.1 % nasal spray 1 spray into each nostril 2 (two) times a day Use in each nostril as directed  Patient not taking: Reported on 8/9/2023 1/11/23   Evangelina Moore MD   dicyclomine (BENTYL) 10 mg capsule Take 1 capsule (10 mg total) by mouth 4 (four) times a day (before meals and at bedtime)  Patient not taking: Reported on 8/10/2023 2/8/23   Evangelina Moore MD   lidocaine (LMX) 4 % cream Apply topically as needed for mild pain  Patient not taking: Reported on 8/10/2023 1/2/22   Marielena Gentile PA-C   metoprolol tartrate (LOPRESSOR) 25 mg tablet Take 0.5 tablets (12.5 mg total) by mouth every 12 (twelve) hours  Patient not taking: Reported on 8/9/2023 10/24/22   Carine Aponte MD   omeprazole (PriLOSEC) 40 MG capsule Take 1 capsule (40 mg total) by mouth 2 (two) times a day before meals for 14 days 5/15/23 5/29/23  Nasir Travis MD   oxyCODONE (Roxicodone) 5 immediate release tablet Take 1 tablet (5 mg total) by mouth every 6 (six) hours as needed for moderate pain or severe pain Max Daily Amount: 20 mg  Patient not taking: Reported on 8/10/2023 5/25/23   HEMANT Teresa   tamsulosin (FLOMAX) 0.4 mg Take 1 capsule (0.4 mg total) by mouth daily with dinner  Patient not taking: Reported on 8/9/2023 5/25/23   HEMANT Torres       Allergies:  No Known Allergies    Review of Systems:     Review of Systems   Constitutional: Negative. HENT: Negative. Eyes: Negative. Respiratory: Negative. Cardiovascular: Positive for chest pain and palpitations. Gastrointestinal: Negative. Endocrine: Negative. Genitourinary: Negative. Musculoskeletal: Negative. Skin: Negative. Allergic/Immunologic: Negative. Neurological: Negative. Hematological: Negative. Psychiatric/Behavioral: Negative. Vital Signs:     Vitals:    08/10/23 1038 08/10/23 1040   BP: 138/89 135/92   BP Location: Left arm Right arm   Patient Position: Sitting Sitting   Cuff Size: Large Large   Pulse: 86    SpO2: 98%    Weight: 83.9 kg (185 lb)    Height: 5' 4" (1.626 m)        Physical Exam:     Physical Exam  Constitutional:       Appearance: Normal appearance. HENT:      Head: Normocephalic and atraumatic. Eyes:      Pupils: Pupils are equal, round, and reactive to light. Cardiovascular:      Rate and Rhythm: Normal rate and regular rhythm. Pulses: Normal pulses. Heart sounds: No murmur heard. Pulmonary:      Effort: Pulmonary effort is normal.      Breath sounds: Normal breath sounds. Abdominal:      General: Abdomen is flat. Palpations: Abdomen is soft. Musculoskeletal:         General: Normal range of motion. Cervical back: Normal range of motion. Skin:     General: Skin is warm and dry. Capillary Refill: Capillary refill takes less than 2 seconds. Neurological:      General: No focal deficit present. Mental Status: She is alert.          Lab Results:               Invalid input(s): "LABGLOM"      Lab Results   Component Value Date    HGBA1C 5.5 05/25/2022     Lab Results   Component Value Date    TROPONINI <0.02 10/20/2020       Imaging Studies:     CT Chest: 7/30/23  CTA - CHEST, ABDOMEN AND PELVIS - WITHOUT AND WITH IV CONTRAST     INDICATION:   History of ascending aortic aneurysm now with sharp chest pain and numbness to left arm.     COMPARISON: 4/14/2023, 5/25/2023 and 6/10/2023     TECHNIQUE: CT examination of the chest, abdomen and pelvis was performed both prior to and after the administration of intravenous contrast.  The noncontrast portion of this examination was performed utilizing low radiation dose technique. Thin section   angiographic arterial phase post contrast technique was used in order to evaluate for aortic dissection. 3D reformatted images and volume rendering were performed on an independent workstation. Additionally, axial, sagittal, and coronal 2D reformatted   images were created from the source data and submitted for interpretation.     Radiation dose length product (DLP) for this visit:  6536 mGy-cm . This examination, like all CT scans performed in the [facility], was performed utilizing techniques to minimize radiation dose exposure, including the use of iterative   reconstruction and automated exposure control.     IV Contrast:  100 mL of iohexol (OMNIPAQUE)  Enteric Contrast:  Enteric contrast was not administered.     FINDINGS:     AORTA: There is a stable 4.5 cm fusiform aneurysm of the ascending thoracic aorta. No aortic dissection is identified. The celiac, superior mesenteric and inferior mesenteric arteries are patent. There is a dual left renal artery supply.     CHEST     LUNGS: Calcified granulomas. No tracheal or endobronchial lesion.     PLEURA:  Unremarkable.     HEART/PULMONARY ARTERIAL TREE:  Unremarkable for patient's age.     MEDIASTINUM AND JUAN A: Calcified left hilar and mediastinal lymph nodes. No adenopathy.     CHEST WALL AND LOWER NECK:   Unremarkable.     ABDOMEN     LIVER/BILIARY TREE: Liver is diffusely decreased in density consistent with fatty change. No CT evidence of suspicious hepatic mass. Normal hepatic contours.   No biliary dilatation.     GALLBLADDER:  No calcified gallstones. No pericholecystic inflammatory change.     SPLEEN:  Unremarkable.     PANCREAS:  Unremarkable.     ADRENAL GLANDS:  Unremarkable.     KIDNEYS/URETERS: Punctate nonobstructing right renal calculus. No hydronephrosis.     STOMACH AND BOWEL: Gastric sleeve surgery. No bowel obstruction.     APPENDIX:  No findings to suggest appendicitis.     ABDOMINOPELVIC CAVITY:  No ascites or free intraperitoneal air. No lymphadenopathy.     PELVIS     REPRODUCTIVE ORGANS:  Unremarkable for patient's age.     URINARY BLADDER:  Unremarkable.     ABDOMINAL WALL/INGUINAL REGIONS:  Unremarkable.     OSSEOUS STRUCTURES:  No acute fracture or destructive osseous lesion.     IMPRESSION:     Stable 4.5 cm fusiform aneurysm of the ascending thoracic aorta. No aortic dissection is identified. Echocardiogram: 4/15/21  1. Normal left ventricular size and systolic function, abnormal septal motion otherwise no regional wall motion abnormality. EF around 55%. Suspected early grade 1 diastolic dysfunction. 2. Normal right ventricular size and systolic function. 3. Normal left and right atrial size. 4. Aortic valve sclerosis with some focal calcification, no aortic valve stenosis or regurgitation. 5. Mitral valve sclerosis, trace mitral valve regurgitation. 6. Trace tricuspid and pulmonic valve regurgitation. 7. No obvious pulmonary hypertension. 8. No pericardial effusion. 9. Dilated aortic root and proximal ascending aorta measuring up to 4.1 cm.     Compared to report of previous echocardiogram from 2019 there is overall no significant change.         I have personally reviewed pertinent reports. Assessment:  Ascending aortic aneurysm; Ongoing ascending aortic replacement workup     Plan:    Sole Mo has an ascending aorta measuring 45 mm in size at its greatest diameter.  As they are asymptomatic, with no family history follow-up monitoring is the treatment plan. Arrangements have been made for future surveillance to be completed with CT chest, without contrast in 2 Years. Kellen Mcmahon does not meet the following Ascending aortic aneurysm surgical triggers:    * 4.5 cm or > in the setting of + FH of rupture or dissection  * 5 cm with moderate or worse aortic valve disease  *  5.5 cm regardless of aortic valve function and without genetically associated aortic disease   *  Aortic growth > 4mm / year  *  Bicuspid aortic valve with valve dysfunction enough to meet indications for surgery and concomitant ascending aortic aneurysm 4.5 cm or >  * 4.5 cm or > in setting of existing connective tissue disease of Marfan's syndrome, Nathaly-Danlos syndrome or familiar aneurysms syndrome      Kellen Mcmahon was comfortable with our recommendations, and their questions were answered to their satisfaction. Thank you for allowing us to participate in the care of this patient. Aortic Aneurysm Instructions were provided to the patient as follows:    1. No lifting more than 50 pounds  2. Maintain a controlled blood pressure with a goal of 120/80. 3. Follow up in Aortic Clinic as recommended with radiology follow up as instructed  4. Report to the ER or call 911 immediately with the following signs / symptoms: sudden onset of back pain, chest pain or shortness of breath. The patient recently had a screening colonoscopy in 2021. Therefore GI referral is not indicated at this time.      SIGNATURE: Leland Khalil PA-C  DATE: 08/10/23  TIME: 10:51 AM

## 2023-08-16 ENCOUNTER — OFFICE VISIT (OUTPATIENT)
Dept: FAMILY MEDICINE CLINIC | Facility: CLINIC | Age: 53
End: 2023-08-16

## 2023-08-16 VITALS
HEART RATE: 100 BPM | SYSTOLIC BLOOD PRESSURE: 130 MMHG | RESPIRATION RATE: 16 BRPM | DIASTOLIC BLOOD PRESSURE: 80 MMHG | TEMPERATURE: 98.7 F | BODY MASS INDEX: 32.61 KG/M2 | WEIGHT: 191 LBS | OXYGEN SATURATION: 97 % | HEIGHT: 64 IN

## 2023-08-16 DIAGNOSIS — R73.03 PREDIABETES: ICD-10-CM

## 2023-08-16 DIAGNOSIS — E78.2 MIXED HYPERLIPIDEMIA: ICD-10-CM

## 2023-08-16 DIAGNOSIS — I10 ESSENTIAL HYPERTENSION: Primary | ICD-10-CM

## 2023-08-16 DIAGNOSIS — K29.70 HELICOBACTER PYLORI GASTRITIS: ICD-10-CM

## 2023-08-16 DIAGNOSIS — I71.21 ANEURYSM OF ASCENDING AORTA WITHOUT RUPTURE (HCC): ICD-10-CM

## 2023-08-16 DIAGNOSIS — B96.81 HELICOBACTER PYLORI GASTRITIS: ICD-10-CM

## 2023-08-16 PROCEDURE — 3075F SYST BP GE 130 - 139MM HG: CPT | Performed by: FAMILY MEDICINE

## 2023-08-16 PROCEDURE — 3079F DIAST BP 80-89 MM HG: CPT | Performed by: FAMILY MEDICINE

## 2023-08-16 PROCEDURE — 99214 OFFICE O/P EST MOD 30 MIN: CPT | Performed by: FAMILY MEDICINE

## 2023-08-16 RX ORDER — METFORMIN HYDROCHLORIDE 500 MG/1
500 TABLET, EXTENDED RELEASE ORAL
Qty: 30 TABLET | Refills: 1 | Status: SHIPPED | OUTPATIENT
Start: 2023-08-16

## 2023-08-16 RX ORDER — ATORVASTATIN CALCIUM 80 MG/1
80 TABLET, FILM COATED ORAL DAILY
Qty: 90 TABLET | Refills: 2 | Status: SHIPPED | OUTPATIENT
Start: 2023-08-16 | End: 2023-11-14

## 2023-08-16 RX ORDER — AMLODIPINE BESYLATE 10 MG/1
10 TABLET ORAL DAILY
Qty: 90 TABLET | Refills: 3 | Status: SHIPPED | OUTPATIENT
Start: 2023-08-16 | End: 2023-11-14

## 2023-08-16 RX ORDER — EZETIMIBE 10 MG/1
10 TABLET ORAL DAILY
Qty: 90 TABLET | Refills: 1 | Status: SHIPPED | OUTPATIENT
Start: 2023-08-16

## 2023-08-16 NOTE — ASSESSMENT & PLAN NOTE
Patient recently completed antibiotics for H. pylori  Hold PPI for 2 weeks  Recommend repeating H. pylori testing to confirm eradication  Follow-up outpatient with GI

## 2023-08-16 NOTE — ASSESSMENT & PLAN NOTE
-Continue surveillance per cardiothoracic surgery outpatient  -Strict blood pressure control  -Strict control of lipid profile with low-fat/low-cholesterol diet and statin

## 2023-08-16 NOTE — PROGRESS NOTES
Name: George Issa      : 1970      MRN: [id number]  Encounter Provider: Mg Quezada MD  Encounter Date: 2023   Encounter department: [address] [facility]     1. Essential hypertension  Assessment & Plan:  Well-controlled  Blood pressure goal less than 140/90  Continue amlodipine and metoprolol  DASH diet discussed with patient    Orders:  -     amLODIPine (NORVASC) 10 mg tablet; Take 1 tablet (10 mg total) by mouth daily    2. Mixed hyperlipidemia  Assessment & Plan:  -Continue atorvastatin and Zetia  -Dietary counseling provided  -We will monitor LDL with goal of less than 70    Orders:  -     atorvastatin (LIPITOR) 80 mg tablet; Take 1 tablet (80 mg total) by mouth daily  -     ezetimibe (ZETIA) 10 mg tablet; Take 1 tablet (10 mg total) by mouth daily    3. Prediabetes  Assessment & Plan:  Continue metformin  Recommend low-carb diet  Repeat hemoglobin A1c    Orders:  -     metFORMIN (GLUCOPHAGE-XR) 500 mg 24 hr tablet; Take 1 tablet (500 mg total) by mouth daily with breakfast    4. Aneurysm of ascending aorta without rupture [facility])  Assessment & Plan:  -Continue surveillance per cardiothoracic surgery outpatient  -Strict blood pressure control  -Strict control of lipid profile with low-fat/low-cholesterol diet and statin        5. Helicobacter pylori gastritis  Assessment & Plan:  Patient recently completed antibiotics for H. pylori  Hold PPI for 2 weeks  Recommend repeating H. pylori testing to confirm eradication  Follow-up outpatient with GI           Subjective      43-year-old female with a past medical history of hyperlipidemia, ascending aortic aneurysm, hypertension, and obesity who presents today for follow-up. She has no acute concerns today. Patient reports that she needs a medication refill. She was recently diagnosed with H. pylori infection. She completed antibiotics.   She is in process of obtaining repeat testing. She reports that she still occasionally get abdominal discomfort and bloating but overall her symptoms much improved. Review of Systems   Constitutional: Negative for appetite change, chills, diaphoresis, fatigue and fever. Eyes: Negative for visual disturbance. Respiratory: Negative for shortness of breath and wheezing. Cardiovascular: Negative for chest pain and palpitations. Gastrointestinal: Positive for abdominal pain. Negative for diarrhea, nausea and vomiting. Genitourinary: Negative for dysuria, hematuria and urgency. Musculoskeletal: Negative for back pain. Skin: Negative for rash. Neurological: Negative for dizziness, seizures, syncope and headaches. Psychiatric/Behavioral: Negative for agitation.        Current Outpatient Medications on File Prior to Visit   Medication Sig   • [DISCONTINUED] atorvastatin (LIPITOR) 80 mg tablet Take 1 tablet (80 mg total) by mouth daily   • acetaminophen (TYLENOL) 500 mg tablet Take 1 tablet (500 mg total) by mouth every 6 (six) hours as needed for mild pain   • azelastine (ASTELIN) 0.1 % nasal spray 1 spray into each nostril 2 (two) times a day Use in each nostril as directed (Patient not taking: Reported on 8/9/2023)   • cholecalciferol (VITAMIN D3) 1,000 units tablet Take 1 tablet (1,000 Units total) by mouth in the morning 4/1/22 pt   • dicyclomine (BENTYL) 10 mg capsule Take 1 capsule (10 mg total) by mouth 4 (four) times a day (before meals and at bedtime) (Patient not taking: Reported on 8/10/2023)   • famotidine (PEPCID) 20 mg tablet Take 1 tablet (20 mg total) by mouth 2 (two) times a day (Patient taking differently: Take 20 mg by mouth 2 (two) times a day Takes as needed)   • FeroSul 325 (65 Fe) MG tablet    • loratadine (CLARITIN) 10 mg tablet Take 1 tablet (10 mg total) by mouth daily (Patient taking differently: Take 10 mg by mouth daily prn)   • meclizine (ANTIVERT) 25 mg tablet Take 1 tablet (25 mg total) by mouth 3 (three) times a day as needed for dizziness   • metoprolol tartrate (LOPRESSOR) 25 mg tablet Take 0.5 tablets (12.5 mg total) by mouth every 12 (twelve) hours (Patient not taking: Reported on 8/9/2023)   • naproxen (NAPROSYN) 500 mg tablet Take 1 tablet (500 mg total) by mouth every 12 (twelve) hours as needed for mild pain or moderate pain   • omeprazole (PriLOSEC) 40 MG capsule Take 1 capsule (40 mg total) by mouth daily   • omeprazole (PriLOSEC) 40 MG capsule Take 1 capsule (40 mg total) by mouth 2 (two) times a day before meals for 14 days   • ondansetron (ZOFRAN-ODT) 4 mg disintegrating tablet Take 1 tablet (4 mg total) by mouth every 6 (six) hours as needed for nausea or vomiting   • oxyCODONE (Roxicodone) 5 immediate release tablet Take 1 tablet (5 mg total) by mouth every 6 (six) hours as needed for moderate pain or severe pain Max Daily Amount: 20 mg (Patient not taking: Reported on 8/10/2023)   • [DISCONTINUED] amLODIPine (NORVASC) 10 mg tablet Take 1 tablet (10 mg total) by mouth daily   • [DISCONTINUED] ezetimibe (ZETIA) 10 mg tablet Take 1 tablet (10 mg total) by mouth daily   • [DISCONTINUED] lidocaine (LMX) 4 % cream Apply topically as needed for mild pain (Patient not taking: Reported on 8/10/2023)   • [DISCONTINUED] metFORMIN (GLUCOPHAGE-XR) 500 mg 24 hr tablet Take 500 mg by mouth daily with breakfast 4/1/22 Pt reports using daily with breakfast.  Patient states she takes it sometimes. • [DISCONTINUED] tamsulosin (FLOMAX) 0.4 mg Take 1 capsule (0.4 mg total) by mouth daily with dinner (Patient not taking: Reported on 8/9/2023)       Objective     /80 (BP Location: Right arm, Patient Position: Sitting, Cuff Size: Large)   Pulse 100   Temp 98.7 °F (37.1 °C) (Temporal)   Resp 16   Ht 5' 4" (1.626 m)   Wt 86.6 kg (191 lb)   LMP 12/15/2018 (Approximate) Comment: Hx of tubal ligation  SpO2 97%   BMI 32.79 kg/m²     Physical Exam  Constitutional:       General: She is not in acute distress. Appearance: Normal appearance. She is well-developed. She is not ill-appearing, toxic-appearing or diaphoretic. HENT:      Head: Normocephalic. Right Ear: External ear normal.      Left Ear: External ear normal.      Nose: Nose normal.   Eyes:      General: No scleral icterus. Right eye: No discharge. Left eye: No discharge. Extraocular Movements: Extraocular movements intact. Pupils: Pupils are equal, round, and reactive to light. Neck:      Thyroid: No thyromegaly. Vascular: No JVD. Trachea: No tracheal deviation. Cardiovascular:      Rate and Rhythm: Normal rate and regular rhythm. Heart sounds: Normal heart sounds. No murmur heard. No friction rub. No gallop. Pulmonary:      Effort: Pulmonary effort is normal. No respiratory distress. Breath sounds: Normal breath sounds. No stridor. No wheezing. Abdominal:      General: Bowel sounds are normal. There is no distension. Palpations: Abdomen is soft. There is no mass. Tenderness: There is no abdominal tenderness. There is no guarding. Hernia: No hernia is present. Musculoskeletal:         General: Normal range of motion. Cervical back: Normal range of motion. Skin:     General: Skin is warm. Capillary Refill: Capillary refill takes less than 2 seconds. Findings: No rash. Neurological:      General: No focal deficit present. Mental Status: She is alert and oriented to person, place, and time. Cranial Nerves: No cranial nerve deficit. Motor: No weakness or abnormal muscle tone.       Gait: Gait normal.   Psychiatric:         Mood and Affect: Mood normal.         Behavior: Behavior normal.       Trevor Kemp MD

## 2023-08-16 NOTE — ASSESSMENT & PLAN NOTE
-Continue atorvastatin and Zetia  -Dietary counseling provided  -We will monitor LDL with goal of less than 70

## 2023-08-25 ENCOUNTER — LAB (OUTPATIENT)
Dept: LAB | Facility: MEDICAL CENTER | Age: 53
End: 2023-08-25

## 2023-08-25 DIAGNOSIS — A04.8 H. PYLORI INFECTION: ICD-10-CM

## 2023-08-26 ENCOUNTER — HOSPITAL ENCOUNTER (OUTPATIENT)
Dept: ULTRASOUND IMAGING | Facility: HOSPITAL | Age: 53
Discharge: HOME | End: 2023-08-26
Payer: COMMERCIAL

## 2023-08-26 DIAGNOSIS — K76.0 FATTY LIVER: ICD-10-CM

## 2023-08-26 PROCEDURE — 76981 USE PARENCHYMA: CPT

## 2023-08-28 ENCOUNTER — APPOINTMENT (OUTPATIENT)
Dept: LAB | Facility: MEDICAL CENTER | Age: 53
End: 2023-08-28
Payer: COMMERCIAL

## 2023-08-28 DIAGNOSIS — I10 ESSENTIAL HYPERTENSION: ICD-10-CM

## 2023-08-28 PROCEDURE — 87338 HPYLORI STOOL AG IA: CPT

## 2023-08-30 LAB — H PYLORI AG STL QL IA: NEGATIVE

## 2023-09-05 NOTE — RESULT ENCOUNTER NOTE
Please call patient and tell her that her ultrasound elastography did show fat in her liver which we knew about but there is little to no scarring. This is good news. Thanks.

## 2023-09-06 ENCOUNTER — NURSE TRIAGE (OUTPATIENT)
Age: 53
End: 2023-09-06

## 2023-09-20 ENCOUNTER — VBI (OUTPATIENT)
Dept: ADMINISTRATIVE | Facility: OTHER | Age: 53
End: 2023-09-20

## 2023-10-19 ENCOUNTER — OFFICE VISIT (OUTPATIENT)
Dept: GASTROENTEROLOGY | Facility: MEDICAL CENTER | Age: 53
End: 2023-10-19

## 2023-10-19 VITALS
BODY MASS INDEX: 32.06 KG/M2 | HEART RATE: 87 BPM | SYSTOLIC BLOOD PRESSURE: 136 MMHG | WEIGHT: 186.8 LBS | DIASTOLIC BLOOD PRESSURE: 99 MMHG | TEMPERATURE: 98.2 F

## 2023-10-19 DIAGNOSIS — R14.0 ABDOMINAL BLOATING: Primary | ICD-10-CM

## 2023-10-19 DIAGNOSIS — K21.9 GASTROESOPHAGEAL REFLUX DISEASE WITHOUT ESOPHAGITIS: ICD-10-CM

## 2023-10-19 NOTE — PROGRESS NOTES
[facility] - Outpatient Follow-up Note  Esperanza Yao 47 y.o. female MRN: [id number]  Encounter: [id number]          ASSESSMENT AND PLAN:      Bloating  She is having chronic abdominal bloating that is worse after every meal.  She has had this for several months. She cannot pinpoint specific food triggers. She is currently taking omeprazole 40 mg twice daily and famotidine 20 mg twice daily. She is following an antireflux diet. 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 EGD 4/23 noted possible small hiatal hernia. She is status post sleeve gastrectomy. Biopsies were positive for H. pylori but it was eradicated with treatment. Her biopsies were also negative for celiac.    -Low FODMAP diet  -Follow-up in office in 2 to 3 months  -If no improvement of symptoms consider SIBO testing    2. Fatty liver    History of fatty liver. Recent ultrasound elastography noted S3 steatosis and F0-F1 fibrosis. Recent LFTs normal other than alkaline phosphatase 123. -Lifestyle modifications with low-fat/low-cholesterol diet and weight reduction  -LFTs every 6 months    ______________________________________________________________________    SUBJECTIVE: 41-year-old female here for follow-up. Was last seen by myself 8/9/2023 for history of H. pylori. EGD was done 4/20/2023. Prior sleeve gastrectomy, possible small hiatal hernia noted. Biopsies were positive for H. pylori. She was treated with quadruple therapy. She completed all the treatment. This was completed 7/23. A repeat stool for H. pylori confirmed eradication. She is having chronic abdominal bloating that is worse after every meal.  She has had this for several months. She cannot pinpoint specific food triggers. She is currently taking omeprazole 40 mg twice daily and famotidine 20 mg twice daily.   She is following an antireflux diet. 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 History of fatty liver. Recent ultrasound elastography noted S3 steatosis and F0-F1 fibrosis. Prior EGD/colonoscopy     EGD 4/20/2023-prior sleeve gastrectomy, possible small hiatal hernia noted. Biopsies were positive for H. pylori. Bx neg for celiac.     5/11/21- Colon- normal    REVIEW OF SYSTEMS IS OTHERWISE NEGATIVE. Review of systems negative other than per HPI.       Historical Information   Past Medical History:   Diagnosis Date   • Anemia     4/1/22 Pt reports hx of anemia - no current issues at this time    • Anesthesia     4/1/22 Pt reports her mother had reaction to anesthesia - reports her mother "didnt wake up for two weeks"    • Aortic aneurysm [facility])    • Breast cancer ([address]) 01/20/2017    left side   • Depression     4/1/22 Hx of depression - resolved per pt at this time    • Diabetes mellitus ([address])    • GERD (gastroesophageal reflux disease)    • History of chemotherapy 2017    chest radiation   • History of COVID-19     4/1/22 Pt reports COVID in 2021   • Hyperlipidemia    • Hypertension    • Left wrist pain 07/22/2019   • Migraine    • Obesity     Post medical management with Phentermine/topiramate    • Prediabetes    • Sleep apnea     Hx of sleep apnea - using CPAP in the past but since gastric surgery no further issues    • Tendinitis     4/1/22 Pt reports right hand tendinitis      Past Surgical History:   Procedure Laterality Date   • ABDOMINOPLASTY N/A 04/06/2022    Procedure: ABDOMINOPLASTY;  Surgeon: Danny Mcpherson MD;  Location: [facility] MAIN OR;  Service: Plastics   • BREAST BIOPSY Left 12/06/2016    stereo   • BREAST LUMPECTOMY W/ NEEDLE LOCALIZATION Left 01/20/2017    RADIATION,HORMONE THERAPY  (DCIS LEFT BREAST)   • COLONOSCOPY     • GASTRECTOMY SLEEVE LAPAROSCOPIC     • VA EXCISION SKIN ABD INFRAUMBILICAL PANNICULECTOMY N/A 04/06/2022 Procedure: LIPECTOMY;  Surgeon: Rainer Meza MD;  Location: [address] [place] [address] OR;  Service: Plastics   • AR MASTOPEXY Bilateral 10/06/2022    Procedure: MASTOPEXY BREAST;  Surgeon: Rainer Meza MD;  Location: [address] [place] [address] OR;  Service: Plastics   • REDUCTION MAMMAPLASTY     • TUBAL LIGATION     • WRIST SURGERY Right      Social History   Social History     Substance and Sexual Activity   Alcohol Use Yes    Comment: occasionally     Social History     Substance and Sexual Activity   Drug Use Never    Comment: Denies any former or current use      Social History     Tobacco Use   Smoking Status Never   • Passive exposure: Never   Smokeless Tobacco Never   Tobacco Comments    Denies former or current use     Family History   Problem Relation Age of Onset   • Hyperlipidemia Mother    • Diabetes Mother    • Hypertension Mother    • Colon cancer Father    • Liver cancer Father    • Hypertension Sister    • No Known Problems Sister    • No Known Problems Sister    • No Known Problems Daughter    • No Known Problems Daughter    • No Known Problems Daughter    • No Known Problems Maternal Grandmother    • No Known Problems Maternal Grandfather    • Skin cancer Paternal Grandmother    • Lymphoma Paternal Grandfather    • Hypertension Brother    • No Known Problems Maternal Aunt    • No Known Problems Maternal Aunt    • No Known Problems Maternal Aunt    • No Known Problems Maternal Aunt    • No Known Problems Paternal Aunt    • No Known Problems Paternal Aunt    • No Known Problems Paternal Aunt    • No Known Problems Paternal Aunt    • Breast cancer Neg Hx        Meds/Allergies       Current Outpatient Medications:   •  acetaminophen (TYLENOL) 500 mg tablet  •  amLODIPine (NORVASC) 10 mg tablet  •  atorvastatin (LIPITOR) 80 mg tablet  •  cholecalciferol (VITAMIN D3) 1,000 units tablet  •  ezetimibe (ZETIA) 10 mg tablet  •  FeroSul 325 (65 Fe) MG tablet  •  meclizine (ANTIVERT) 25 mg tablet  •  metFORMIN (GLUCOPHAGE-XR) 500 mg 24 hr tablet  •  naproxen (NAPROSYN) 500 mg tablet  •  omeprazole (PriLOSEC) 40 MG capsule  •  ondansetron (ZOFRAN-ODT) 4 mg disintegrating tablet  •  azelastine (ASTELIN) 0.1 % nasal spray  •  dicyclomine (BENTYL) 10 mg capsule  •  famotidine (PEPCID) 20 mg tablet  •  loratadine (CLARITIN) 10 mg tablet  •  metoprolol tartrate (LOPRESSOR) 25 mg tablet  •  omeprazole (PriLOSEC) 40 MG capsule  •  oxyCODONE (Roxicodone) 5 immediate release tablet    Current Facility-Administered Medications:   •  Inclisiran Sodium (LEQVIO) subcutaneous injection 284 mg, 284 mg, Subcutaneous, Once    No Known Allergies        Objective     Blood pressure 136/99, pulse 87, temperature 98.2 °F (36.8 °C), temperature source Tympanic, weight 84.7 kg (186 lb 12.8 oz), last menstrual period 12/15/2018, not currently breastfeeding. Body mass index is 32.06 kg/m². PHYSICAL EXAM:      General Appearance:   Alert, cooperative, no distress   HEENT:   Normocephalic, atraumatic, anicteric. Neck:  Supple, symmetrical, trachea midline   Lungs:   Clear to auscultation bilaterally; no rales, rhonchi or wheezing; respirations unlabored    Heart[de-identified]   Regular rate and rhythm; no murmur, rub, or gallop. Abdomen:   Soft, non-tender, non-distended; normal bowel sounds; no masses, no organomegaly    Genitalia:   Deferred    Rectal:   Deferred    Extremities:  No cyanosis, clubbing or edema    Pulses:  2+ and symmetric    Skin:  No jaundice, rashes, or lesions    Lymph nodes:  No palpable cervical lymphadenopathy        Lab Results:   No visits with results within 1 Day(s) from this visit. Latest known visit with results is:   Lab on 08/25/2023   Component Date Value   • H pylori Ag, Stl 08/28/2023 Negative          Radiology Results:   No results found.

## 2023-10-25 ENCOUNTER — HOSPITAL ENCOUNTER (EMERGENCY)
Facility: HOSPITAL | Age: 53
Discharge: HOME | End: 2023-10-25
Attending: EMERGENCY MEDICINE | Admitting: EMERGENCY MEDICINE
Payer: COMMERCIAL

## 2023-10-25 VITALS
TEMPERATURE: 98.3 F | HEART RATE: 94 BPM | SYSTOLIC BLOOD PRESSURE: 173 MMHG | DIASTOLIC BLOOD PRESSURE: 106 MMHG | OXYGEN SATURATION: 97 % | RESPIRATION RATE: 18 BRPM

## 2023-10-25 DIAGNOSIS — B34.9 VIRAL SYNDROME: Primary | ICD-10-CM

## 2023-10-25 LAB
FLUAV RNA RESP QL NAA+PROBE: POSITIVE
FLUBV RNA RESP QL NAA+PROBE: NEGATIVE
RSV RNA RESP QL NAA+PROBE: NEGATIVE
S PYO DNA THROAT QL NAA+PROBE: NOT DETECTED
SARS-COV-2 RNA RESP QL NAA+PROBE: NEGATIVE

## 2023-10-25 PROCEDURE — 0241U HB NFCT DS VIR RESP RNA 4 TRGT: CPT

## 2023-10-25 PROCEDURE — 87651 STREP A DNA AMP PROBE: CPT

## 2023-10-25 PROCEDURE — 99284 EMERGENCY DEPT VISIT MOD MDM: CPT

## 2023-10-25 RX ORDER — GUAIFENESIN/DEXTROMETHORPHAN 100-10MG/5
5 SYRUP ORAL 3 TIMES DAILY PRN
Qty: 354 ML | Refills: 0 | Status: SHIPPED | OUTPATIENT
Start: 2023-10-25

## 2023-10-25 RX ORDER — ACETAMINOPHEN 500 MG
500 TABLET ORAL EVERY 6 HOURS PRN
Qty: 60 TABLET | Refills: 0 | Status: SHIPPED | OUTPATIENT
Start: 2023-10-25

## 2023-10-25 RX ORDER — FLUTICASONE PROPIONATE 50 MCG
1 SPRAY, SUSPENSION (ML) NASAL DAILY
Qty: 16 G | Refills: 0 | Status: SHIPPED | OUTPATIENT
Start: 2023-10-25

## 2023-10-25 RX ORDER — IBUPROFEN 600 MG/1
600 TABLET ORAL EVERY 6 HOURS PRN
Qty: 30 TABLET | Refills: 0 | Status: SHIPPED | OUTPATIENT
Start: 2023-10-25

## 2023-10-25 NOTE — Clinical Note
Dao Daly was seen and treated in our emergency department on 10/25/2023. Diagnosis:     Bhumika Abad  . She may return on this date:     Can return to work once fever free for 24 hours without medication. Fever is >100.4      If you have any questions or concerns, please don't hesitate to call.       Bandar Dutton PA-C    ______________________________           _______________          _______________  Hospital Representative                              Date                                Time

## 2023-10-25 NOTE — ED PROVIDER NOTES
History  Chief Complaint   Patient presents with    Cough     Pt reportts cough, headache and nasal congestion for one week. Pt instructed to come in to get tested for covid by her job. 47 YOF with cough, headache, nasal congestion and fever since 10/20. Patient reports fever of 102 last night. No fever today. States that her job is requiring her to be tested for [place]. Has been taking Tylenol and TheraFlu at home. Denies vomiting or diarrhea. No chest pain or shortness of breath. Prior to Admission Medications   Prescriptions Last Dose Informant Patient Reported? Taking?    FeroSul 325 (65 Fe) MG tablet  Self Yes No   Sig: prn   acetaminophen (TYLENOL) 500 mg tablet  Self No No   Sig: Take 1 tablet (500 mg total) by mouth every 6 (six) hours as needed for mild pain   amLODIPine (NORVASC) 10 mg tablet   No No   Sig: Take 1 tablet (10 mg total) by mouth daily   atorvastatin (LIPITOR) 80 mg tablet   No No   Sig: Take 1 tablet (80 mg total) by mouth daily   azelastine (ASTELIN) 0.1 % nasal spray  Self No No   Si spray into each nostril 2 (two) times a day Use in each nostril as directed   Patient not taking: Reported on 2023   cholecalciferol (VITAMIN D3) 1,000 units tablet  Self No No   Sig: Take 1 tablet (1,000 Units total) by mouth in the morning 22 pt   dicyclomine (BENTYL) 10 mg capsule  Self No No   Sig: Take 1 capsule (10 mg total) by mouth 4 (four) times a day (before meals and at bedtime)   Patient not taking: Reported on 8/10/2023   ezetimibe (ZETIA) 10 mg tablet   No No   Sig: Take 1 tablet (10 mg total) by mouth daily   famotidine (PEPCID) 20 mg tablet  Self No No   Sig: Take 1 tablet (20 mg total) by mouth 2 (two) times a day   Patient not taking: Reported on 10/19/2023   loratadine (CLARITIN) 10 mg tablet  Self No No   Sig: Take 1 tablet (10 mg total) by mouth daily   Patient taking differently: Take 10 mg by mouth daily prn   meclizine (ANTIVERT) 25 mg tablet  Self No No Sig: Take 1 tablet (25 mg total) by mouth 3 (three) times a day as needed for dizziness   metFORMIN (GLUCOPHAGE-XR) 500 mg 24 hr tablet   No No   Sig: Take 1 tablet (500 mg total) by mouth daily with breakfast   metoprolol tartrate (LOPRESSOR) 25 mg tablet  Self No No   Sig: Take 0.5 tablets (12.5 mg total) by mouth every 12 (twelve) hours   Patient not taking: Reported on 8/9/2023   naproxen (NAPROSYN) 500 mg tablet  Self No No   Sig: Take 1 tablet (500 mg total) by mouth every 12 (twelve) hours as needed for mild pain or moderate pain   omeprazole (PriLOSEC) 40 MG capsule  Self No No   Sig: Take 1 capsule (40 mg total) by mouth daily   omeprazole (PriLOSEC) 40 MG capsule   No No   Sig: Take 1 capsule (40 mg total) by mouth 2 (two) times a day before meals for 14 days   ondansetron (ZOFRAN-ODT) 4 mg disintegrating tablet  Self No No   Sig: Take 1 tablet (4 mg total) by mouth every 6 (six) hours as needed for nausea or vomiting   oxyCODONE (Roxicodone) 5 immediate release tablet  Self No No   Sig: Take 1 tablet (5 mg total) by mouth every 6 (six) hours as needed for moderate pain or severe pain Max Daily Amount: 20 mg   Patient not taking: Reported on 8/10/2023      Facility-Administered Medications Last Administration Doses Remaining   Inclisiran Sodium (LEQVIO) subcutaneous injection 284 mg None recorded 1          Past Medical History:   Diagnosis Date    Anemia     4/1/22 Pt reports hx of anemia - no current issues at this time     Anesthesia     4/1/22 Pt reports her mother had reaction to anesthesia - reports her mother "didnt wake up for two weeks"     Aortic aneurysm ([address])     Breast cancer ([address]) 01/20/2017    left side    Depression     4/1/22 Hx of depression - resolved per pt at this time     Diabetes mellitus ([address])     GERD (gastroesophageal reflux disease)     History of chemotherapy 2017    chest radiation    History of COVID-19     4/1/22 Pt reports COVID in 2021    Hyperlipidemia     Hypertension Left wrist pain 07/22/2019    Migraine     Obesity     Post medical management with Phentermine/topiramate     Prediabetes     Sleep apnea     Hx of sleep apnea - using CPAP in the past but since gastric surgery no further issues     Tendinitis     4/1/22 Pt reports right hand tendinitis        Past Surgical History:   Procedure Laterality Date    ABDOMINOPLASTY N/A 04/06/2022    Procedure: ABDOMINOPLASTY;  Surgeon: Marina Glass MD;  Location:  MAIN OR;  Service: Plastics    BREAST BIOPSY Left 12/06/2016    stereo    BREAST LUMPECTOMY W/ NEEDLE LOCALIZATION Left 01/20/2017    RADIATION,HORMONE THERAPY  (DCIS LEFT BREAST)    COLONOSCOPY      GASTRECTOMY SLEEVE LAPAROSCOPIC      AZ EXCISION SKIN ABD INFRAUMBILICAL PANNICULECTOMY N/A 04/06/2022    Procedure: LIPECTOMY;  Surgeon: Marina Glass MD;  Location:  MAIN OR;  Service: Plastics    AZ MASTOPEXY Bilateral 10/06/2022    Procedure: MASTOPEXY BREAST;  Surgeon: Marina Glass MD;  Location:  MAIN OR;  Service: Plastics    REDUCTION MAMMAPLASTY      TUBAL LIGATION      WRIST SURGERY Right        Family History   Problem Relation Age of Onset    Hyperlipidemia Mother     Diabetes Mother     Hypertension Mother     Colon cancer Father     Liver cancer Father     Hypertension Sister     No Known Problems Sister     No Known Problems Sister     No Known Problems Daughter     No Known Problems Daughter     No Known Problems Daughter     No Known Problems Maternal Grandmother     No Known Problems Maternal Grandfather     Skin cancer Paternal Grandmother     Lymphoma Paternal Grandfather     Hypertension Brother     No Known Problems Maternal Aunt     No Known Problems Maternal Aunt     No Known Problems Maternal Aunt     No Known Problems Maternal Aunt     No Known Problems Paternal Aunt     No Known Problems Paternal Aunt     No Known Problems Paternal Aunt     No Known Problems Paternal Aunt     Breast cancer Neg Hx      I have reviewed and agree with the history as documented. E-Cigarette/Vaping    E-Cigarette Use Never User     Comments Denies any former or current use       E-Cigarette/Vaping Substances     Social History     Tobacco Use    Smoking status: Never     Passive exposure: Never    Smokeless tobacco: Never    Tobacco comments:     Denies former or current use   Vaping Use    Vaping Use: Never used   Substance Use Topics    Alcohol use: Yes     Comment: occasionally    Drug use: Never     Comment: Denies any former or current use        Review of Systems   Constitutional:  Positive for fever. Negative for chills. HENT:  Positive for congestion, rhinorrhea and sinus pressure. Respiratory:  Positive for cough. Gastrointestinal:  Negative for diarrhea, nausea and vomiting. Neurological:  Positive for headaches. All other systems reviewed and are negative. Physical Exam  Physical Exam  Vitals and nursing note reviewed. Constitutional:       General: She is not in acute distress. Appearance: Normal appearance. She is well-developed. She is not ill-appearing. HENT:      Head: Normocephalic and atraumatic. Nose: Congestion and rhinorrhea present. Eyes:      Conjunctiva/sclera: Conjunctivae normal.   Cardiovascular:      Rate and Rhythm: Normal rate and regular rhythm. Heart sounds: No murmur heard. Pulmonary:      Effort: Pulmonary effort is normal. No respiratory distress. Breath sounds: Normal breath sounds. Abdominal:      Palpations: Abdomen is soft. Tenderness: There is no abdominal tenderness. Musculoskeletal:         General: No swelling. Cervical back: Normal range of motion and neck supple. Skin:     General: Skin is warm and dry. Capillary Refill: Capillary refill takes less than 2 seconds. Neurological:      Mental Status: She is alert.    Psychiatric:         Mood and Affect: Mood normal.         Behavior: Behavior normal.         Vital Signs  ED Triage Vitals [10/25/23 0949] Temperature Pulse Respirations Blood Pressure SpO2   98.3 °F (36.8 °C) 94 18 (!) 173/106 97 %      Temp Source Heart Rate Source Patient Position - Orthostatic VS BP Location FiO2 (%)   Oral Monitor -- Right arm --      Pain Score       --           Vitals:    10/25/23 0949   BP: (!) 173/106   Pulse: 94         Visual Acuity      ED Medications  Medications - No data to display    Diagnostic Studies  Results Reviewed       Procedure Component Value Units Date/Time    Strep A PCR [[id number]] Collected: 10/25/23 1014    Lab Status: In process Specimen: Throat Updated: 10/25/23 1018    FLU/RSV/COVID - if FLU/RSV clinically relevant [[id number]] Collected: 10/25/23 1014    Lab Status: In process Specimen: Nares from Nose Updated: 10/25/23 1018                   No orders to display              Procedures  Procedures         ED Course                                             Medical Decision Making  53 YOF with cough, headache, nasal congestion and fever since 10/20. TMax 102 last night, no fever today. See physical exam above. Patient likely has a viral syndrome however we will also swab for strep throat, at this time no abx needed unless strep is positive. Discussed with her that we would call with only positive results. Also discussed how to download the [employer] edouard in order for her to see her results. Discussed supportive measures for at home. Return to work once fever free for 24 hours without medication. I have discussed the plan to discharge pt from ED. The patient was discharged in stable condition. Patient ambulated off the department. Extensive return to emergency department precautions were discussed. Follow up with appropriate providers including primary care physician was discussed. Patient and/or their  primary decision maker expressed understanding. Patient remained stable during entire emergency department stay.     Portions of the record may have been created with voice recognition software. Occasional wrong word or "sound a like" substitutions may have occurred due to the inherent limitations of voice recognition software. Read the chart carefully and recognize, using context, where substitutions have occurred. Problems Addressed:  Viral syndrome: acute illness or injury    Amount and/or Complexity of Data Reviewed  Labs: ordered. Risk  OTC drugs. Prescription drug management. Disposition  Final diagnoses:   Viral syndrome     Time reflects when diagnosis was documented in both MDM as applicable and the Disposition within this note       Time User Action Codes Description Comment    10/25/2023 10:22 AM Ghzaal Phillip Add [B34.9] Viral syndrome           ED Disposition       ED Disposition   Discharge    Condition   Stable    Date/Time   Wed Oct 25, 2023 10:18 AM    Comment   Merlin [place] discharge to home/self care. Follow-up Information    None         Discharge Medication List as of 10/25/2023 10:23 AM        START taking these medications    Details   !! acetaminophen (TYLENOL) 500 mg tablet Take 1 tablet (500 mg total) by mouth every 6 (six) hours as needed for mild pain, Starting Wed 10/25/2023, Normal      dextromethorphan-guaiFENesin (ROBITUSSIN DM)  mg/5 mL syrup Take 5 mL by mouth 3 (three) times a day as needed for cough, Starting Wed 10/25/2023, Normal      fluticasone (FLONASE) 50 mcg/act nasal spray 1 spray into each nostril daily, Starting Wed 10/25/2023, Normal      ibuprofen (MOTRIN) 600 mg tablet Take 1 tablet (600 mg total) by mouth every 6 (six) hours as needed for mild pain, Starting Wed 10/25/2023, Normal       !! - Potential duplicate medications found. Please discuss with provider.         CONTINUE these medications which have NOT CHANGED    Details   !! acetaminophen (TYLENOL) 500 mg tablet Take 1 tablet (500 mg total) by mouth every 6 (six) hours as needed for mild pain, Starting Fri 4/14/2023, Normal      amLODIPine (NORVASC) 10 mg tablet Take 1 tablet (10 mg total) by mouth daily, Starting Wed 8/16/2023, Until Tue 11/14/2023, Normal      atorvastatin (LIPITOR) 80 mg tablet Take 1 tablet (80 mg total) by mouth daily, Starting Wed 8/16/2023, Until Tue 11/14/2023, Normal      azelastine (ASTELIN) 0.1 % nasal spray 1 spray into each nostril 2 (two) times a day Use in each nostril as directed, Starting Wed 1/11/2023, Normal      cholecalciferol (VITAMIN D3) 1,000 units tablet Take 1 tablet (1,000 Units total) by mouth in the morning 4/1/22 pt, Starting Tue 5/23/2023, Normal      dicyclomine (BENTYL) 10 mg capsule Take 1 capsule (10 mg total) by mouth 4 (four) times a day (before meals and at bedtime), Starting Wed 2/8/2023, Normal      ezetimibe (ZETIA) 10 mg tablet Take 1 tablet (10 mg total) by mouth daily, Starting Wed 8/16/2023, Normal      famotidine (PEPCID) 20 mg tablet Take 1 tablet (20 mg total) by mouth 2 (two) times a day, Starting Sun 2/12/2023, Print      FeroSul 325 (65 Fe) MG tablet prn, Starting Wed 5/31/2023, Historical Med      loratadine (CLARITIN) 10 mg tablet Take 1 tablet (10 mg total) by mouth daily, Starting Wed 1/11/2023, Normal      meclizine (ANTIVERT) 25 mg tablet Take 1 tablet (25 mg total) by mouth 3 (three) times a day as needed for dizziness, Starting Wed 2/8/2023, Normal      metFORMIN (GLUCOPHAGE-XR) 500 mg 24 hr tablet Take 1 tablet (500 mg total) by mouth daily with breakfast, Starting Wed 8/16/2023, Normal      metoprolol tartrate (LOPRESSOR) 25 mg tablet Take 0.5 tablets (12.5 mg total) by mouth every 12 (twelve) hours, Starting Mon 10/24/2022, Normal      naproxen (NAPROSYN) 500 mg tablet Take 1 tablet (500 mg total) by mouth every 12 (twelve) hours as needed for mild pain or moderate pain, Starting Sun 7/30/2023, Normal      omeprazole (PriLOSEC) 40 MG capsule Take 1 capsule (40 mg total) by mouth daily, Starting Wed 4/19/2023, Normal      ondansetron (ZOFRAN-ODT) 4 mg disintegrating tablet Take 1 tablet (4 mg total) by mouth every 6 (six) hours as needed for nausea or vomiting, Starting Fri 4/14/2023, Normal      oxyCODONE (Roxicodone) 5 immediate release tablet Take 1 tablet (5 mg total) by mouth every 6 (six) hours as needed for moderate pain or severe pain Max Daily Amount: 20 mg, Starting Thu 5/25/2023, Normal       !! - Potential duplicate medications found. Please discuss with provider. No discharge procedures on file.     PDMP Review         Value Time User    PDMP Reviewed  Yes 5/25/2023  4:16 PM Zora Bamberger, [facility]            ED Provider  Electronically Signed by             Minda Landeros PA-C  10/25/23 5718

## 2023-10-25 NOTE — DISCHARGE INSTRUCTIONS
-we will call if positive results.  If negative we do not call  -alternate with motrin and tylenol every 3 hours as needed  -use flonase as directed  -use robitussin DM as directed

## 2023-11-16 ENCOUNTER — OFFICE VISIT (OUTPATIENT)
Dept: FAMILY MEDICINE CLINIC | Facility: CLINIC | Age: 53
End: 2023-11-16

## 2023-11-16 VITALS
RESPIRATION RATE: 16 BRPM | SYSTOLIC BLOOD PRESSURE: 130 MMHG | WEIGHT: 185 LBS | OXYGEN SATURATION: 97 % | HEART RATE: 106 BPM | DIASTOLIC BLOOD PRESSURE: 80 MMHG | TEMPERATURE: 98.6 F | HEIGHT: 64 IN | BODY MASS INDEX: 31.58 KG/M2

## 2023-11-16 DIAGNOSIS — R53.83 OTHER FATIGUE: ICD-10-CM

## 2023-11-16 DIAGNOSIS — Z23 ENCOUNTER FOR IMMUNIZATION: ICD-10-CM

## 2023-11-16 DIAGNOSIS — R73.03 PREDIABETES: ICD-10-CM

## 2023-11-16 DIAGNOSIS — I10 ESSENTIAL HYPERTENSION: Primary | ICD-10-CM

## 2023-11-16 DIAGNOSIS — I71.21 ANEURYSM OF ASCENDING AORTA WITHOUT RUPTURE (HCC): ICD-10-CM

## 2023-11-16 DIAGNOSIS — E78.2 MIXED HYPERLIPIDEMIA: ICD-10-CM

## 2023-11-16 PROCEDURE — 3075F SYST BP GE 130 - 139MM HG: CPT | Performed by: FAMILY MEDICINE

## 2023-11-16 PROCEDURE — 99214 OFFICE O/P EST MOD 30 MIN: CPT | Performed by: FAMILY MEDICINE

## 2023-11-16 PROCEDURE — 90686 IIV4 VACC NO PRSV 0.5 ML IM: CPT | Performed by: FAMILY MEDICINE

## 2023-11-16 PROCEDURE — 90471 IMMUNIZATION ADMIN: CPT | Performed by: FAMILY MEDICINE

## 2023-11-16 PROCEDURE — 3079F DIAST BP 80-89 MM HG: CPT | Performed by: FAMILY MEDICINE

## 2023-11-16 RX ORDER — AMLODIPINE BESYLATE 10 MG/1
10 TABLET ORAL DAILY
Qty: 90 TABLET | Refills: 3 | Status: SHIPPED | OUTPATIENT
Start: 2023-11-16 | End: 2024-02-14

## 2023-11-16 RX ORDER — ATORVASTATIN CALCIUM 80 MG/1
80 TABLET, FILM COATED ORAL DAILY
Qty: 90 TABLET | Refills: 2 | Status: SHIPPED | OUTPATIENT
Start: 2023-11-16 | End: 2024-02-14

## 2023-11-16 NOTE — PROGRESS NOTES
Name: Esperanza Yao      : 1970      MRN: [id number]  Encounter Provider: Alethea Velazco MD  Encounter Date: 2023   Encounter department: [address] [facility]     1. Essential hypertension  Assessment & Plan:  Well-controlled  Blood pressure goal less than 140/90  Continue amlodipine and metoprolol  DASH diet discussed with patient    Orders:  -     amLODIPine (NORVASC) 10 mg tablet; Take 1 tablet (10 mg total) by mouth daily    2. Mixed hyperlipidemia  -     atorvastatin (LIPITOR) 80 mg tablet; Take 1 tablet (80 mg total) by mouth daily    3. Other fatigue  -     Vitamin D 25 hydroxy; Future    4. Prediabetes  Assessment & Plan:  Continue metformin  Recommend low-carb diet dietary plan  Monitor hemoglobin A1c      5. Aneurysm of ascending aorta without rupture [facility])  Assessment & Plan:  -Continue surveillance per cardiothoracic surgery outpatient  -Strict blood pressure control  -Strict control of lipid profile with low-fat/low-cholesterol diet and statin      Orders:  -     metoprolol tartrate (LOPRESSOR) 25 mg tablet; Take 0.5 tablets (12.5 mg total) by mouth every 12 (twelve) hours    6. Encounter for immunization  -     influenza vaccine, quadrivalent, 0.5 mL, preservative-free, for adult and pediatric patients 6 mos+ (AFLURIA, FLUARIX, FLULAVAL, FLUZONE)           Subjective      60-year-old female with a history of ascending aortic aneurysm, prediabetes, mixed hyperlipidemia, and hypertension who presents today for follow-up. She has no major concerns today. She is at her baseline state of health. She does report generalized fatigue. She is yet to obtain her labs. She is following outpatient with cardiology and thoracic surgery. She is requesting some medication refills. Review of Systems   Constitutional:  Positive for fatigue. Negative for appetite change, chills, diaphoresis and fever.    Eyes:  Negative for visual disturbance. Respiratory:  Negative for shortness of breath and wheezing. Cardiovascular:  Negative for chest pain, palpitations and leg swelling. Gastrointestinal:  Negative for abdominal pain, diarrhea, nausea and vomiting. Genitourinary:  Negative for dysuria, hematuria and urgency. Musculoskeletal:  Negative for back pain. Skin:  Negative for rash. Neurological:  Negative for dizziness, seizures, syncope and headaches. Psychiatric/Behavioral:  Negative for agitation.         Current Outpatient Medications on File Prior to Visit   Medication Sig    [DISCONTINUED] atorvastatin (LIPITOR) 80 mg tablet Take 1 tablet (80 mg total) by mouth daily    [DISCONTINUED] metoprolol tartrate (LOPRESSOR) 25 mg tablet Take 0.5 tablets (12.5 mg total) by mouth every 12 (twelve) hours    acetaminophen (TYLENOL) 500 mg tablet Take 1 tablet (500 mg total) by mouth every 6 (six) hours as needed for mild pain    acetaminophen (TYLENOL) 500 mg tablet Take 1 tablet (500 mg total) by mouth every 6 (six) hours as needed for mild pain    azelastine (ASTELIN) 0.1 % nasal spray 1 spray into each nostril 2 (two) times a day Use in each nostril as directed (Patient not taking: Reported on 8/9/2023)    cholecalciferol (VITAMIN D3) 1,000 units tablet Take 1 tablet (1,000 Units total) by mouth in the morning 4/1/22 pt    dextromethorphan-guaiFENesin (ROBITUSSIN DM)  mg/5 mL syrup Take 5 mL by mouth 3 (three) times a day as needed for cough    dicyclomine (BENTYL) 10 mg capsule Take 1 capsule (10 mg total) by mouth 4 (four) times a day (before meals and at bedtime) (Patient not taking: Reported on 8/10/2023)    ezetimibe (ZETIA) 10 mg tablet Take 1 tablet (10 mg total) by mouth daily    famotidine (PEPCID) 20 mg tablet Take 1 tablet (20 mg total) by mouth 2 (two) times a day (Patient not taking: Reported on 10/19/2023)    FeroSul 325 (65 Fe) MG tablet prn    fluticasone (FLONASE) 50 mcg/act nasal spray 1 spray into each nostril daily    ibuprofen (MOTRIN) 600 mg tablet Take 1 tablet (600 mg total) by mouth every 6 (six) hours as needed for mild pain    loratadine (CLARITIN) 10 mg tablet Take 1 tablet (10 mg total) by mouth daily (Patient taking differently: Take 10 mg by mouth daily prn)    meclizine (ANTIVERT) 25 mg tablet Take 1 tablet (25 mg total) by mouth 3 (three) times a day as needed for dizziness    metFORMIN (GLUCOPHAGE-XR) 500 mg 24 hr tablet Take 1 tablet (500 mg total) by mouth daily with breakfast    naproxen (NAPROSYN) 500 mg tablet Take 1 tablet (500 mg total) by mouth every 12 (twelve) hours as needed for mild pain or moderate pain    omeprazole (PriLOSEC) 40 MG capsule Take 1 capsule (40 mg total) by mouth daily    omeprazole (PriLOSEC) 40 MG capsule Take 1 capsule (40 mg total) by mouth 2 (two) times a day before meals for 14 days    ondansetron (ZOFRAN-ODT) 4 mg disintegrating tablet Take 1 tablet (4 mg total) by mouth every 6 (six) hours as needed for nausea or vomiting    oxyCODONE (Roxicodone) 5 immediate release tablet Take 1 tablet (5 mg total) by mouth every 6 (six) hours as needed for moderate pain or severe pain Max Daily Amount: 20 mg (Patient not taking: Reported on 8/10/2023)    [DISCONTINUED] amLODIPine (NORVASC) 10 mg tablet Take 1 tablet (10 mg total) by mouth daily       Objective     /80 (BP Location: Right arm, Patient Position: Sitting, Cuff Size: Standard)   Pulse (!) 106   Temp 98.6 °F (37 °C) (Temporal)   Resp 16   Ht 5' 4" (1.626 m)   Wt 83.9 kg (185 lb)   LMP 12/15/2018 (Approximate) Comment: Hx of tubal ligation  SpO2 97%   BMI 31.76 kg/m²     Physical Exam  Constitutional:       General: She is not in acute distress. Appearance: Normal appearance. She is well-developed. She is not ill-appearing, toxic-appearing or diaphoretic. HENT:      Head: Normocephalic.       Right Ear: External ear normal.      Left Ear: External ear normal.      Nose: Nose normal.   Eyes: General: No scleral icterus. Right eye: No discharge. Left eye: No discharge. Extraocular Movements: Extraocular movements intact. Pupils: Pupils are equal, round, and reactive to light. Neck:      Thyroid: No thyromegaly. Vascular: No JVD. Trachea: No tracheal deviation. Cardiovascular:      Rate and Rhythm: Normal rate and regular rhythm. Heart sounds: Normal heart sounds. No murmur heard. No friction rub. No gallop. Pulmonary:      Effort: Pulmonary effort is normal. No respiratory distress. Breath sounds: Normal breath sounds. No stridor. No wheezing. Abdominal:      General: Bowel sounds are normal. There is no distension. Palpations: Abdomen is soft. There is no mass. Tenderness: There is no abdominal tenderness. There is no guarding. Musculoskeletal:         General: Normal range of motion. Cervical back: Normal range of motion. Right lower leg: No edema. Left lower leg: No edema. Skin:     General: Skin is warm. Capillary Refill: Capillary refill takes less than 2 seconds. Neurological:      General: No focal deficit present. Mental Status: She is alert and oriented to person, place, and time. Cranial Nerves: No cranial nerve deficit. Motor: No weakness or abnormal muscle tone.       Gait: Gait normal.   Psychiatric:         Mood and Affect: Mood normal.         Behavior: Behavior normal.       Verna Hernandez MD

## 2023-12-04 ENCOUNTER — OFFICE VISIT (OUTPATIENT)
Dept: LAB | Facility: HOSPITAL | Age: 53
End: 2023-12-04
Payer: COMMERCIAL

## 2023-12-04 ENCOUNTER — APPOINTMENT (OUTPATIENT)
Dept: LAB | Facility: HOSPITAL | Age: 53
End: 2023-12-04
Payer: COMMERCIAL

## 2023-12-04 DIAGNOSIS — Z01.812 PRE-OPERATIVE LABORATORY EXAMINATION: ICD-10-CM

## 2023-12-04 DIAGNOSIS — Z20.828 EXPOSURE TO SARS-ASSOCIATED CORONAVIRUS: ICD-10-CM

## 2023-12-04 LAB
ANION GAP SERPL CALCULATED.3IONS-SCNC: 11 MMOL/L
APTT PPP: 25 SECONDS (ref 23–37)
ATRIAL RATE: 72 BPM
B-HCG SERPL-ACNC: <1 MIU/ML (ref 0–5)
BASOPHILS # BLD AUTO: 0.03 THOUSANDS/ÂΜL (ref 0–0.1)
BASOPHILS NFR BLD AUTO: 1 % (ref 0–1)
BUN SERPL-MCNC: 13 MG/DL (ref 5–25)
CALCIUM SERPL-MCNC: 9.8 MG/DL (ref 8.4–10.2)
CHLORIDE SERPL-SCNC: 103 MMOL/L (ref 96–108)
CO2 SERPL-SCNC: 26 MMOL/L (ref 21–32)
CREAT SERPL-MCNC: 0.76 MG/DL (ref 0.6–1.3)
EOSINOPHIL # BLD AUTO: 0.4 THOUSAND/ÂΜL (ref 0–0.61)
EOSINOPHIL NFR BLD AUTO: 7 % (ref 0–6)
ERYTHROCYTE [DISTWIDTH] IN BLOOD BY AUTOMATED COUNT: 13.2 % (ref 11.6–15.1)
EST. AVERAGE GLUCOSE BLD GHB EST-MCNC: 128 MG/DL
GFR SERPL CREATININE-BSD FRML MDRD: 90 ML/MIN/1.73SQ M
GLUCOSE P FAST SERPL-MCNC: 106 MG/DL (ref 65–99)
HBA1C MFR BLD: 6.1 %
HCT VFR BLD AUTO: 40.4 % (ref 34.8–46.1)
HGB BLD-MCNC: 13 G/DL (ref 11.5–15.4)
HIV 1+2 AB+HIV1 P24 AG SERPL QL IA: NORMAL
HIV 2 AB SERPL QL IA: NORMAL
HIV1 AB SERPL QL IA: NORMAL
HIV1 P24 AG SERPL QL IA: NORMAL
IMM GRANULOCYTES # BLD AUTO: 0.03 THOUSAND/UL (ref 0–0.2)
IMM GRANULOCYTES NFR BLD AUTO: 1 % (ref 0–2)
INR PPP: 0.88 (ref 0.84–1.19)
LYMPHOCYTES # BLD AUTO: 2.13 THOUSANDS/ÂΜL (ref 0.6–4.47)
LYMPHOCYTES NFR BLD AUTO: 36 % (ref 14–44)
MCH RBC QN AUTO: 28.5 PG (ref 26.8–34.3)
MCHC RBC AUTO-ENTMCNC: 32.2 G/DL (ref 31.4–37.4)
MCV RBC AUTO: 89 FL (ref 82–98)
MONOCYTES # BLD AUTO: 0.41 THOUSAND/ÂΜL (ref 0.17–1.22)
MONOCYTES NFR BLD AUTO: 7 % (ref 4–12)
NEUTROPHILS # BLD AUTO: 2.87 THOUSANDS/ÂΜL (ref 1.85–7.62)
NEUTS SEG NFR BLD AUTO: 48 % (ref 43–75)
NRBC BLD AUTO-RTO: 0 /100 WBCS
P AXIS: -3 DEGREES
PLATELET # BLD AUTO: 280 THOUSANDS/UL (ref 149–390)
PMV BLD AUTO: 10.4 FL (ref 8.9–12.7)
POTASSIUM SERPL-SCNC: 4.2 MMOL/L (ref 3.5–5.3)
PR INTERVAL: 132 MS
PROTHROMBIN TIME: 12.2 SECONDS (ref 11.6–14.5)
QRS AXIS: 0 DEGREES
QRSD INTERVAL: 78 MS
QT INTERVAL: 400 MS
QTC INTERVAL: 438 MS
RBC # BLD AUTO: 4.56 MILLION/UL (ref 3.81–5.12)
SODIUM SERPL-SCNC: 140 MMOL/L (ref 135–147)
T WAVE AXIS: 12 DEGREES
VENTRICULAR RATE: 72 BPM
WBC # BLD AUTO: 5.87 THOUSAND/UL (ref 4.31–10.16)

## 2023-12-04 PROCEDURE — 85025 COMPLETE CBC W/AUTO DIFF WBC: CPT

## 2023-12-04 PROCEDURE — 87389 HIV-1 AG W/HIV-1&-2 AB AG IA: CPT

## 2023-12-04 PROCEDURE — 85610 PROTHROMBIN TIME: CPT

## 2023-12-04 PROCEDURE — 85730 THROMBOPLASTIN TIME PARTIAL: CPT

## 2023-12-04 PROCEDURE — 86376 MICROSOMAL ANTIBODY EACH: CPT

## 2023-12-04 PROCEDURE — 83036 HEMOGLOBIN GLYCOSYLATED A1C: CPT

## 2023-12-04 PROCEDURE — 86769 SARS-COV-2 COVID-19 ANTIBODY: CPT

## 2023-12-04 PROCEDURE — 93005 ELECTROCARDIOGRAM TRACING: CPT

## 2023-12-04 PROCEDURE — 80048 BASIC METABOLIC PNL TOTAL CA: CPT

## 2023-12-04 PROCEDURE — 86800 THYROGLOBULIN ANTIBODY: CPT

## 2023-12-04 PROCEDURE — 36415 COLL VENOUS BLD VENIPUNCTURE: CPT

## 2023-12-04 PROCEDURE — 84702 CHORIONIC GONADOTROPIN TEST: CPT

## 2023-12-05 ENCOUNTER — OFFICE VISIT (OUTPATIENT)
Dept: BARIATRICS | Facility: CLINIC | Age: 53
End: 2023-12-05
Payer: COMMERCIAL

## 2023-12-05 VITALS
HEART RATE: 76 BPM | WEIGHT: 186.8 LBS | RESPIRATION RATE: 16 BRPM | SYSTOLIC BLOOD PRESSURE: 133 MMHG | DIASTOLIC BLOOD PRESSURE: 84 MMHG | BODY MASS INDEX: 33.1 KG/M2 | HEIGHT: 63 IN

## 2023-12-05 DIAGNOSIS — I10 ESSENTIAL HYPERTENSION: ICD-10-CM

## 2023-12-05 DIAGNOSIS — K76.0 HEPATIC STEATOSIS: ICD-10-CM

## 2023-12-05 DIAGNOSIS — E66.9 OBESITY, CLASS I, BMI 30-34.9: Primary | ICD-10-CM

## 2023-12-05 DIAGNOSIS — R73.03 PREDIABETES: ICD-10-CM

## 2023-12-05 PROBLEM — E66.811 OBESITY, CLASS I, BMI 30-34.9: Status: ACTIVE | Noted: 2017-08-03

## 2023-12-05 LAB
THYROGLOB AB SERPL-ACNC: <1 IU/ML (ref 0–0.9)
THYROPEROXIDASE AB SERPL-ACNC: <9 IU/ML (ref 0–34)

## 2023-12-05 PROCEDURE — 99204 OFFICE O/P NEW MOD 45 MIN: CPT | Performed by: PHYSICIAN ASSISTANT

## 2023-12-05 NOTE — ASSESSMENT & PLAN NOTE
-Discussed options of HealthyCORE-Intensive Lifestyle Intervention Program, Very Low Calorie Diet-VLCD, and Conservative Program and the role of weight loss medications. Explained the importance of making lifestyle changes if utilizing medication to aid in weight loss  -S/P sleeve gastrectomy approximately May 2019 w/unknown surgeon  -Initial weight loss goal of 5-10% weight loss for improved health  -Screening labs and records reviewed from prior      -Patient is interested in pursuing coservative plan  -Calorie goals (1000), sample menu, portion size guidelines, and food logging reviewed with the patient. Goals:  -Food log (ie.) [email or web address].com,[place],[employer]-1000  - To drink at least 64oz of water daily. No sugary beverages.  -discussed planning out an afternoon snack and snack lists provided    To start wegovy when available. Not to start prior to surgery this month. Patient denies personal and family history of MCT and MEN2 tumors. Patient denies personal history of pancreatitis. Side effects discussed but not limited to diarrhea, bloating, constipation, GI upset, heartburn, increased heart rate, headache, low blood sugar, fatigue and dizziness. Titration and medication administration discussed. Prior on phentermine(SE headaches), wellbutrin and metformin.   Cannot take topamax due to kidney stones    Initial Weight:185lb  Goal Weight:150lb

## 2023-12-05 NOTE — PROGRESS NOTES
Assessment/Plan:    Obesity, Class I, BMI 30-34.9  -Discussed options of HealthyCORE-Intensive Lifestyle Intervention Program, Very Low Calorie Diet-VLCD, and Conservative Program and the role of weight loss medications. Explained the importance of making lifestyle changes if utilizing medication to aid in weight loss  -S/P sleeve gastrectomy approximately May 2019 w/unknown surgeon  -Initial weight loss goal of 5-10% weight loss for improved health  -Screening labs and records reviewed from prior      -Patient is interested in pursuing coservative plan  -Calorie goals (1000), sample menu, portion size guidelines, and food logging reviewed with the patient. Goals:  -Food log (ie.) www.[employer].com,[employer],[place]-1000  - To drink at least 64oz of water daily. No sugary beverages.  -discussed planning out an afternoon snack and snack lists provided    To start wegovy when available. Not to start prior to surgery this month. Patient denies personal and family history of MCT and MEN2 tumors. Patient denies personal history of pancreatitis. Side effects discussed but not limited to diarrhea, bloating, constipation, GI upset, heartburn, increased heart rate, headache, low blood sugar, fatigue and dizziness. Titration and medication administration discussed. Prior on phentermine(SE headaches), wellbutrin and metformin. Cannot take topamax due to kidney stones    Initial Weight:185lb  Goal Weight:150lb        Hepatic steatosis  Following with GI  -should improve with weight loss, dietary, and lifestyle changes      Essential hypertension  On norvasc and metoprolol  -should improve with weight loss, dietary, and lifestyle changes      Prediabetes  Was on metformin prior. Will benefit from wegovy       Follow up in approximately  6 week nurse visit and 4 months   with Non-Surgical Physician/Advanced Practitioner.   I have spent a total time of 45 minutes on 12/05/23 in caring for this patient including Diagnostic results, Prognosis, Risks and benefits of tx options, Instructions for management, Patient and family education, Importance of tx compliance, Risk factor reductions, Impressions, Counseling / Coordination of care, Documenting in the medical record, Reviewing / ordering tests, medicine, procedures  , and Obtaining or reviewing history  . Diagnoses and all orders for this visit:    Obesity, Class I, BMI 30-34.9  -     Semaglutide-Weight Management (WEGOVY) 0.25 MG/0.5ML; Inject 0.5 mL (0.25 mg total) under the skin once a week    Hepatic steatosis  -     Semaglutide-Weight Management (WEGOVY) 0.25 MG/0.5ML; Inject 0.5 mL (0.25 mg total) under the skin once a week    Essential hypertension  -     Semaglutide-Weight Management (WEGOVY) 0.25 MG/0.5ML; Inject 0.5 mL (0.25 mg total) under the skin once a week    Prediabetes          Subjective:   Chief Complaint   Patient presents with    Consult     MWM- Consult; Goal Wt 150lb ; Waist 39.5in - Patient had sleep apnea       Patient ID: Alexander Carlson  is a 47 y.o. female with excess weight/obesity here to pursue weight management.     Past Medical History:   Diagnosis Date    Anemia     4/1/22 Pt reports hx of anemia - no current issues at this time     Anesthesia     4/1/22 Pt reports her mother had reaction to anesthesia - reports her mother "didnt wake up for two weeks"     Aortic aneurysm ([address])     Breast cancer ([address]) 01/20/2017    left side    Depression     4/1/22 Hx of depression - resolved per pt at this time     Diabetes mellitus ([address])     GERD (gastroesophageal reflux disease)     History of chemotherapy 2017    chest radiation    History of COVID-19     4/1/22 Pt reports COVID in 2021    Hyperlipidemia     Hypertension     Left wrist pain 07/22/2019    Migraine     Obesity     Post medical management with Phentermine/topiramate     Prediabetes     Sleep apnea     Hx of sleep apnea - using CPAP in the past but since gastric surgery no further issues     Tendinitis     4/1/22 Pt reports right hand tendinitis        HPI: Here for MWM consult  She had weight loss surgery with approximately May 2019. She does not know surgeons name but was seeing [facility] weight loss with Dr. Parveen Kelly. Weight was 227lb prior to surgery. Earl was 160lb. She had started to gain weight about 2 year after surgery. She did have EGD done 4/24/23 with GI. She was taking phentermine and was getting headaches with it. Also was on metformin and wellbutrin prior. She is interested in GLP1 RA    She has surgery on the 20th of December in [place] with plastic surgery     Obesity/Excess Weight:  Severity:  class I w/hepatic steatosis, htn, HLD, prediabetes  Onset:  onset about 3 years    Modifiers: Diet and Exercise, Physician Supervised Weight Loss Program, and Prescription Weight Loss Medications  Contributing factors:  unsure  Associated symptoms: comorbid conditions    Dislikes: eggs    Hydration:3 16 oz bottles water,  2 tea w/lemon and tyrese  Alcohol: rare less than weekly  Exercise:nothing formal  Occupation:pill packing  Sleep:5-6 hours of sleep  Dining out/takeout:weekends only    Colonoscopy-Completed    Diet Recall :   B (sometimes skips)  L (12PM) rice, chicken  S: cake  (sometimes brings and sometimes friend offers)  D:Rice, rojas/lentil  S: cookie, ice cream, cereal     The following portions of the patient's history were reviewed and updated as appropriate: She  has a past medical history of Anemia, Anesthesia, Aortic aneurysm ([address]), Breast cancer ([address]) (01/20/2017), Depression, Diabetes mellitus ([address]), GERD (gastroesophageal reflux disease), History of chemotherapy (2017), History of COVID-19, Hyperlipidemia, Hypertension, Left wrist pain (07/22/2019), Migraine, Obesity, Prediabetes, Sleep apnea, and Tendinitis.   She   Patient Active Problem List    Diagnosis Date Noted    Prediabetes 08/16/2023    History of bilateral breast reduction surgery 06/06/2023    Right ureteral stone 38/43/7649    Helicobacter pylori gastritis 05/23/2023    Deviated nasal septum 02/08/2023    Hepatic steatosis 02/08/2023    Dyspepsia 01/11/2023    Allergic rhinitis 01/11/2023    Ptosis of breast 10/06/2022    Vitamin D deficiency 05/06/2022    Lipodystrophy 04/07/2022    Sprain of right wrist 01/31/2022    Mixed hyperlipidemia 10/18/2021    Benign paroxysmal positional vertigo due to bilateral vestibular disorder 07/28/2021    Annual physical exam 10/09/2019    Ascending aortic aneurysm ([address]) 06/06/2019    Chronic midline low back pain without sciatica 01/26/2019    History of diabetes mellitus 01/25/2019    Status post laparoscopic sleeve gastrectomy 01/25/2019    Ductal carcinoma in situ (DCIS) of breast 08/03/2017    Essential hypertension 08/03/2017    Obesity, Class I, BMI 30-34.9 08/03/2017    Uterine fibroid 08/03/2017     She  has a past surgical history that includes Tubal ligation; GASTRECTOMY SLEEVE LAPAROSCOPIC; Breast biopsy (Left, 12/06/2016); Breast lumpectomy w/ needle localization (Left, 01/20/2017); Colonoscopy; pr excision skin abd infraumbilical panniculectomy (N/A, 04/06/2022); ABDOMINOPLASTY (N/A, 04/06/2022); pr mastopexy (Bilateral, 10/06/2022); Wrist surgery (Right); and Reduction mammaplasty. Her family history includes Colon cancer in her father; Diabetes in her mother; Hyperlipidemia in her mother; Hypertension in her brother, mother, and sister; Liver cancer in her father; Lymphoma in her paternal grandfather; No Known Problems in her daughter, daughter, daughter, maternal aunt, maternal aunt, maternal aunt, maternal aunt, maternal grandfather, maternal grandmother, paternal aunt, paternal aunt, paternal aunt, paternal aunt, sister, and sister; Skin cancer in her paternal grandmother. She  reports that she has never smoked. She has never been exposed to tobacco smoke. She has never used smokeless tobacco. She reports current alcohol use.  She reports that she does not use drugs.   Current Outpatient Medications   Medication Sig Dispense Refill    amLODIPine (NORVASC) 10 mg tablet Take 1 tablet (10 mg total) by mouth daily 90 tablet 3    atorvastatin (LIPITOR) 80 mg tablet Take 1 tablet (80 mg total) by mouth daily 90 tablet 2    cholecalciferol (VITAMIN D3) 1,000 units tablet Take 1 tablet (1,000 Units total) by mouth in the morning 4/1/22 pt 90 tablet 1    dicyclomine (BENTYL) 10 mg capsule Take 1 capsule (10 mg total) by mouth 4 (four) times a day (before meals and at bedtime) 30 capsule 1    ezetimibe (ZETIA) 10 mg tablet Take 1 tablet (10 mg total) by mouth daily 90 tablet 1    famotidine (PEPCID) 20 mg tablet Take 1 tablet (20 mg total) by mouth 2 (two) times a day (Patient taking differently: Take 20 mg by mouth as needed) 30 tablet 0    FeroSul 325 (65 Fe) MG tablet prn      meclizine (ANTIVERT) 25 mg tablet Take 1 tablet (25 mg total) by mouth 3 (three) times a day as needed for dizziness (Patient taking differently: Take 25 mg by mouth as needed for dizziness) 30 tablet 3    metoprolol tartrate (LOPRESSOR) 25 mg tablet Take 0.5 tablets (12.5 mg total) by mouth every 12 (twelve) hours 90 tablet 1    ondansetron (ZOFRAN-ODT) 4 mg disintegrating tablet Take 1 tablet (4 mg total) by mouth every 6 (six) hours as needed for nausea or vomiting (Patient taking differently: Take 4 mg by mouth as needed for nausea or vomiting) 20 tablet 0    Semaglutide-Weight Management (WEGOVY) 0.25 MG/0.5ML Inject 0.5 mL (0.25 mg total) under the skin once a week 2 mL 0    fluticasone (FLONASE) 50 mcg/act nasal spray 1 spray into each nostril daily (Patient not taking: Reported on 12/5/2023) 16 g 0    loratadine (CLARITIN) 10 mg tablet Take 1 tablet (10 mg total) by mouth daily (Patient not taking: Reported on 12/5/2023) 30 tablet 0    metFORMIN (GLUCOPHAGE-XR) 500 mg 24 hr tablet Take 1 tablet (500 mg total) by mouth daily with breakfast (Patient not taking: Reported on 12/5/2023) 30 tablet 1    naproxen (NAPROSYN) 500 mg tablet Take 1 tablet (500 mg total) by mouth every 12 (twelve) hours as needed for mild pain or moderate pain (Patient not taking: Reported on 12/5/2023) 15 tablet 0    omeprazole (PriLOSEC) 40 MG capsule Take 1 capsule (40 mg total) by mouth 2 (two) times a day before meals for 14 days 28 capsule 0    oxyCODONE (Roxicodone) 5 immediate release tablet Take 1 tablet (5 mg total) by mouth every 6 (six) hours as needed for moderate pain or severe pain Max Daily Amount: 20 mg (Patient not taking: Reported on 8/10/2023) 5 tablet 0     Current Facility-Administered Medications   Medication Dose Route Frequency Provider Last Rate Last Admin    Inclisiran Sodium (LEQVIO) subcutaneous injection 284 mg  284 mg Subcutaneous Once Dioni Truong, DO         Current Outpatient Medications on File Prior to Visit   Medication Sig    amLODIPine (NORVASC) 10 mg tablet Take 1 tablet (10 mg total) by mouth daily    atorvastatin (LIPITOR) 80 mg tablet Take 1 tablet (80 mg total) by mouth daily    cholecalciferol (VITAMIN D3) 1,000 units tablet Take 1 tablet (1,000 Units total) by mouth in the morning 4/1/22 pt    dicyclomine (BENTYL) 10 mg capsule Take 1 capsule (10 mg total) by mouth 4 (four) times a day (before meals and at bedtime)    ezetimibe (ZETIA) 10 mg tablet Take 1 tablet (10 mg total) by mouth daily    famotidine (PEPCID) 20 mg tablet Take 1 tablet (20 mg total) by mouth 2 (two) times a day (Patient taking differently: Take 20 mg by mouth as needed)    FeroSul 325 (65 Fe) MG tablet prn    meclizine (ANTIVERT) 25 mg tablet Take 1 tablet (25 mg total) by mouth 3 (three) times a day as needed for dizziness (Patient taking differently: Take 25 mg by mouth as needed for dizziness)    metoprolol tartrate (LOPRESSOR) 25 mg tablet Take 0.5 tablets (12.5 mg total) by mouth every 12 (twelve) hours    ondansetron (ZOFRAN-ODT) 4 mg disintegrating tablet Take 1 tablet (4 mg total) by mouth every 6 (six) hours as needed for nausea or vomiting (Patient taking differently: Take 4 mg by mouth as needed for nausea or vomiting)    [DISCONTINUED] acetaminophen (TYLENOL) 500 mg tablet Take 1 tablet (500 mg total) by mouth every 6 (six) hours as needed for mild pain (Patient taking differently: Take 500 mg by mouth as needed for mild pain)    [DISCONTINUED] ibuprofen (MOTRIN) 600 mg tablet Take 1 tablet (600 mg total) by mouth every 6 (six) hours as needed for mild pain (Patient taking differently: Take 600 mg by mouth as needed for mild pain)    fluticasone (FLONASE) 50 mcg/act nasal spray 1 spray into each nostril daily (Patient not taking: Reported on 12/5/2023)    loratadine (CLARITIN) 10 mg tablet Take 1 tablet (10 mg total) by mouth daily (Patient not taking: Reported on 12/5/2023)    metFORMIN (GLUCOPHAGE-XR) 500 mg 24 hr tablet Take 1 tablet (500 mg total) by mouth daily with breakfast (Patient not taking: Reported on 12/5/2023)    naproxen (NAPROSYN) 500 mg tablet Take 1 tablet (500 mg total) by mouth every 12 (twelve) hours as needed for mild pain or moderate pain (Patient not taking: Reported on 12/5/2023)    omeprazole (PriLOSEC) 40 MG capsule Take 1 capsule (40 mg total) by mouth 2 (two) times a day before meals for 14 days    oxyCODONE (Roxicodone) 5 immediate release tablet Take 1 tablet (5 mg total) by mouth every 6 (six) hours as needed for moderate pain or severe pain Max Daily Amount: 20 mg (Patient not taking: Reported on 8/10/2023)    [DISCONTINUED] acetaminophen (TYLENOL) 500 mg tablet Take 1 tablet (500 mg total) by mouth every 6 (six) hours as needed for mild pain (Patient not taking: Reported on 12/5/2023)    [DISCONTINUED] azelastine (ASTELIN) 0.1 % nasal spray 1 spray into each nostril 2 (two) times a day Use in each nostril as directed (Patient not taking: Reported on 8/9/2023)    [DISCONTINUED] dextromethorphan-guaiFENesin (ROBITUSSIN DM)  mg/5 mL syrup Take 5 mL by mouth 3 (three) times a day as needed for cough (Patient not taking: Reported on 12/5/2023)    [DISCONTINUED] omeprazole (PriLOSEC) 40 MG capsule Take 1 capsule (40 mg total) by mouth daily (Patient not taking: Reported on 12/5/2023)     Current Facility-Administered Medications on File Prior to Visit   Medication    Inclisiran Sodium (LEQVIO) subcutaneous injection 284 mg     She has No Known Allergies. .    Review of Systems   Constitutional:  Negative for fever. Respiratory:  Negative for shortness of breath. Cardiovascular:  Negative for chest pain and palpitations. Gastrointestinal:  Negative for abdominal pain, constipation, diarrhea and vomiting. Genitourinary:  Negative for difficulty urinating. Skin:  Negative for rash. Neurological:  Negative for headaches. Psychiatric/Behavioral:  Negative for dysphoric mood. The patient is not nervous/anxious. Objective:    /84 (BP Location: Left arm, Patient Position: Sitting)   Pulse 76   Resp 16   Ht 5' 2.5" (1.588 m)   Wt 84.7 kg (186 lb 12.8 oz)   LMP 12/15/2018 (Approximate) Comment: Hx of tubal ligation  BMI 33.62 kg/m²     Physical Exam  Vitals and nursing note reviewed. Constitutional:       General: She is not in acute distress. Appearance: She is well-developed. She is obese. HENT:      Head: Normocephalic and atraumatic. Eyes:      Conjunctiva/sclera: Conjunctivae normal.   Neck:      Thyroid: No thyromegaly. Pulmonary:      Effort: Pulmonary effort is normal. No respiratory distress. Skin:     Findings: No rash (visible). Neurological:      Mental Status: She is alert and oriented to person, place, and time.    Psychiatric:         Mood and Affect: Mood normal.         Behavior: Behavior normal.

## 2023-12-06 LAB
SARS-COV-2 AB SERPL QL IA: NORMAL U/ML
SARS-COV-2 AB SERPL-IMP: POSITIVE
SARS-COV-2 ANTIBODIES: POSITIVE
SARS-COV-2 SPIKE AB DILUTION: 4777 U/ML

## 2024-01-31 ENCOUNTER — OFFICE VISIT (OUTPATIENT)
Dept: GASTROENTEROLOGY | Facility: MEDICAL CENTER | Age: 54
End: 2024-01-31
Payer: COMMERCIAL

## 2024-01-31 VITALS
SYSTOLIC BLOOD PRESSURE: 130 MMHG | TEMPERATURE: 98.2 F | HEART RATE: 85 BPM | HEIGHT: 63 IN | WEIGHT: 181 LBS | BODY MASS INDEX: 32.07 KG/M2 | DIASTOLIC BLOOD PRESSURE: 92 MMHG

## 2024-01-31 DIAGNOSIS — K76.0 FATTY LIVER: ICD-10-CM

## 2024-01-31 DIAGNOSIS — R14.0 BLOATING: Primary | ICD-10-CM

## 2024-01-31 PROCEDURE — 99213 OFFICE O/P EST LOW 20 MIN: CPT | Performed by: NURSE PRACTITIONER

## 2024-01-31 NOTE — PROGRESS NOTES
[facility] - Outpatient Follow-up Note  Florencia Stewart 53 y.o. female MRN: [id number]  Encounter: [id number]          ASSESSMENT AND PLAN:      1.  Fatty liver    History of fatty liver.  Recent ultrasound elastography noted S3 steatosis and F0-F1 fibrosis.  Recent LFTs normal other than alkaline phosphatase 123.     -Lifestyle modifications with low-fat/low-cholesterol diet and weight reduction  -LFTs every 6 months      2.  Bloating    She is having chronic abdominal bloating that is worse after every meal.  She has had this for several months.  She cannot pinpoint specific food triggers.  She is currently taking omeprazole 40 mg twice daily and famotidine 20 mg twice daily.  She is following an antireflux diet.  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  EGD 4/23 noted possible small hiatal hernia.  She is status post sleeve gastrectomy.  Biopsies were positive for H. pylori but it was eradicated with treatment.  Her biopsies were also negative for celiac.  Will check for bacterial overgrowth.  Patient is agreement with this plan.  She tried a low FODMAP diet with no success.    -SIBO breath test  -Follow-up in office after testing    ______________________________________________________________________    SUBJECTIVE: 53-year-old female here for follow-up.  She was last seen by myself 10/19/2023 for bloating and fatty liver disease.    She is having chronic abdominal bloating that is worse after every meal.  She has had this for several months.  She cannot pinpoint specific food triggers.  She is currently taking omeprazole 40 mg twice daily and famotidine 20 mg twice daily.  She is following an antireflux diet.  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  EGD 4/23 noted possible small hiatal hernia.  She  "is status post sleeve gastrectomy.  Biopsies were positive for H. pylori but it was eradicated with treatment.  Her biopsies were also negative for celiac.    History of fatty liver.  Recent ultrasound elastography noted S3 steatosis and F0-F1 fibrosis.  Recent LFTs normal other than alkaline phosphatase 123.    Prior EGD/colonoscopy     EGD 4/20/2023-prior sleeve gastrectomy, possible small hiatal hernia noted.  Biopsies were positive for H. pylori.Bx neg for celiac.      5/11/21- Colon- normal    REVIEW OF SYSTEMS IS OTHERWISE NEGATIVE.  10 point review of systems negative other than per HPI      Historical Information   Past Medical History:   Diagnosis Date   • Anemia     4/1/22 Pt reports hx of anemia - no current issues at this time    • Anesthesia     4/1/22 Pt reports her mother had reaction to anesthesia - reports her mother \"didnt wake up for two weeks\"    • Aortic aneurysm (HCC)    • Breast cancer (HCC) 01/20/2017    left side   • Depression     4/1/22 Hx of depression - resolved per pt at this time    • Diabetes mellitus (HCC)    • GERD (gastroesophageal reflux disease)    • History of chemotherapy 2017    chest radiation   • History of COVID-19     4/1/22 Pt reports COVID in 2021   • Hyperlipidemia    • Hypertension    • Left wrist pain 07/22/2019   • Migraine    • Obesity     Post medical management with Phentermine/topiramate    • Prediabetes    • Sleep apnea     Hx of sleep apnea - using CPAP in the past but since gastric surgery no further issues    • Tendinitis     4/1/22 Pt reports right hand tendinitis      Past Surgical History:   Procedure Laterality Date   • ABDOMINOPLASTY N/A 04/06/2022    Procedure: ABDOMINOPLASTY;  Surgeon: Blade Whitfield MD;  Location:  MAIN OR;  Service: Plastics   • BREAST BIOPSY Left 12/06/2016    stereo   • BREAST LUMPECTOMY W/ NEEDLE LOCALIZATION Left 01/20/2017    RADIATION,HORMONE THERAPY  (DCIS LEFT BREAST)   • COLONOSCOPY     • GASTRECTOMY SLEEVE LAPAROSCOPIC   "   • LIPOSUCTION  12/2023   • NH EXCISION SKIN ABD INFRAUMBILICAL PANNICULECTOMY N/A 04/06/2022    Procedure: LIPECTOMY;  Surgeon: Blade Whitfield MD;  Location:  MAIN OR;  Service: Plastics   • NH MASTOPEXY Bilateral 10/06/2022    Procedure: MASTOPEXY BREAST;  Surgeon: Blade Whitfield MD;  Location:  MAIN OR;  Service: Plastics   • REDUCTION MAMMAPLASTY     • TUBAL LIGATION     • WRIST SURGERY Right      Social History   Social History     Substance and Sexual Activity   Alcohol Use Yes    Comment: occasionally     Social History     Substance and Sexual Activity   Drug Use Never    Comment: Denies any former or current use      Social History     Tobacco Use   Smoking Status Never   • Passive exposure: Never   Smokeless Tobacco Never   Tobacco Comments    Denies former or current use     Family History   Problem Relation Age of Onset   • Hyperlipidemia Mother    • Diabetes Mother    • Hypertension Mother    • Colon cancer Father    • Liver cancer Father    • Hypertension Sister    • No Known Problems Sister    • No Known Problems Sister    • Hypertension Brother    • No Known Problems Maternal Grandmother    • No Known Problems Maternal Grandfather    • Skin cancer Paternal Grandmother    • Lymphoma Paternal Grandfather    • No Known Problems Maternal Aunt    • No Known Problems Maternal Aunt    • No Known Problems Maternal Aunt    • No Known Problems Maternal Aunt    • No Known Problems Paternal Aunt    • No Known Problems Paternal Aunt    • No Known Problems Paternal Aunt    • No Known Problems Paternal Aunt    • No Known Problems Daughter    • No Known Problems Daughter    • No Known Problems Daughter    • Breast cancer Neg Hx        Meds/Allergies       Current Outpatient Medications:   •  amLODIPine (NORVASC) 10 mg tablet  •  atorvastatin (LIPITOR) 80 mg tablet  •  cholecalciferol (VITAMIN D3) 1,000 units tablet  •  FeroSul 325 (65 Fe) MG tablet  •  metoprolol tartrate (LOPRESSOR) 25 mg tablet  •   "dicyclomine (BENTYL) 10 mg capsule  •  ezetimibe (ZETIA) 10 mg tablet  •  famotidine (PEPCID) 20 mg tablet  •  fluticasone (FLONASE) 50 mcg/act nasal spray  •  loratadine (CLARITIN) 10 mg tablet  •  meclizine (ANTIVERT) 25 mg tablet  •  metFORMIN (GLUCOPHAGE-XR) 500 mg 24 hr tablet  •  naproxen (NAPROSYN) 500 mg tablet  •  omeprazole (PriLOSEC) 40 MG capsule  •  ondansetron (ZOFRAN-ODT) 4 mg disintegrating tablet  •  oxyCODONE (Roxicodone) 5 immediate release tablet    Current Facility-Administered Medications:   •  Inclisiran Sodium (LEQVIO) subcutaneous injection 284 mg, 284 mg, Subcutaneous, Once    No Known Allergies        Objective     Blood pressure 130/92, pulse 85, temperature 98.2 °F (36.8 °C), temperature source Tympanic, height 5' 2.5\" (1.588 m), weight 82.1 kg (181 lb), last menstrual period 12/15/2018, not currently breastfeeding. Body mass index is 32.58 kg/m².      PHYSICAL EXAM:      General Appearance:   Alert, cooperative, no distress   HEENT:   Normocephalic, atraumatic, anicteric.     Neck:  Supple, symmetrical, trachea midline   Lungs:   Clear to auscultation bilaterally; no rales, rhonchi or wheezing; respirations unlabored    Heart::   Regular rate and rhythm; no murmur, rub, or gallop.   Abdomen:   Soft, non-tender, non-distended; normal bowel sounds; no masses, no organomegaly    Genitalia:   Deferred    Rectal:   Deferred    Extremities:  No cyanosis, clubbing or edema    Pulses:  2+ and symmetric    Skin:  No jaundice, rashes, or lesions    Lymph nodes:  No palpable cervical lymphadenopathy        Lab Results:   No visits with results within 1 Day(s) from this visit.   Latest known visit with results is:   Office Visit on 12/04/2023   Component Date Value   • Ventricular Rate 12/04/2023 72    • Atrial Rate 12/04/2023 72    • LA Interval 12/04/2023 132    • QRSD Interval 12/04/2023 78    • QT Interval 12/04/2023 400    • QTC Interval 12/04/2023 438    • P Axis 12/04/2023 -3    • QRS Axis " 12/04/2023 0    • T Wave Axis 12/04/2023 12          Radiology Results:   No results found.

## 2024-02-02 ENCOUNTER — TELEPHONE (OUTPATIENT)
Dept: GASTROENTEROLOGY | Facility: MEDICAL CENTER | Age: 54
End: 2024-02-02

## 2024-02-02 NOTE — TELEPHONE ENCOUNTER
Left vm letting pt know we have Sibo  breathe test .  I gave her our address and phone #   and attached instructions with it

## 2024-02-05 ENCOUNTER — CLINICAL SUPPORT (OUTPATIENT)
Dept: BARIATRICS | Facility: CLINIC | Age: 54
End: 2024-02-05

## 2024-02-05 VITALS
RESPIRATION RATE: 16 BRPM | DIASTOLIC BLOOD PRESSURE: 80 MMHG | HEART RATE: 86 BPM | SYSTOLIC BLOOD PRESSURE: 125 MMHG | WEIGHT: 184 LBS | HEIGHT: 63 IN | BODY MASS INDEX: 32.6 KG/M2

## 2024-02-05 DIAGNOSIS — R63.5 ABNORMAL WEIGHT GAIN: Primary | ICD-10-CM

## 2024-02-05 PROCEDURE — RECHECK

## 2024-02-05 NOTE — PROGRESS NOTES
Patient last visit weight:  Patient current visit weight:    If you are taking phentermine or other oral weight loss medications, are you experiencing any of the following symptoms:  Headache:   Blurred Vision:   Chest Pain:   Palpitations:  Insomnia:   SPECIFY ORAL MEDICATION AND DOSAGE:     If you are taking an injectable medication,  are you experiencing any of the following symptoms:  Bloating:   Nausea:  Vomiting:   Constipation:   Diarrhea:  SPECIFY INJECTABLE MEDICATION AND CURRENT DOSAGE:  Pt stated she hasn't started any med's yet, she asking if you can send the injection to [facility] on file to start a med.    Vitals:    Is BP less than 100/60? No  Is BP greater than 140/90?  No  Is HR greater than 100? No  **If yes to any of the above, have patient relax and repeat in 5-10 minutes**    Repeat values:    Is BP less than 100/60?  Is BP greater than 140/90?  Is HR greater than 100?  **If values remain outside of ranges above, please consult provider for next steps**

## 2024-02-06 DIAGNOSIS — E66.9 OBESITY, CLASS I, BMI 30-34.9: Primary | ICD-10-CM

## 2024-07-17 ENCOUNTER — TELEPHONE (OUTPATIENT)
Dept: FAMILY MEDICINE CLINIC | Facility: CLINIC | Age: 54
End: 2024-07-17

## 2024-07-17 NOTE — TELEPHONE ENCOUNTER
Received [facility] request from  [facility] received on 7/17/24. Request was scanned into chart and faxed to [facility].

## 2024-08-15 ENCOUNTER — TELEPHONE (OUTPATIENT)
Age: 54
End: 2024-08-15

## 2024-09-05 ENCOUNTER — OFFICE VISIT (OUTPATIENT)
Dept: FAMILY MEDICINE CLINIC | Facility: CLINIC | Age: 54
End: 2024-09-05

## 2024-09-05 VITALS
TEMPERATURE: 98 F | OXYGEN SATURATION: 97 % | BODY MASS INDEX: 34.38 KG/M2 | DIASTOLIC BLOOD PRESSURE: 100 MMHG | WEIGHT: 194 LBS | HEART RATE: 84 BPM | RESPIRATION RATE: 16 BRPM | HEIGHT: 63 IN | SYSTOLIC BLOOD PRESSURE: 148 MMHG

## 2024-09-05 DIAGNOSIS — K76.0 HEPATIC STEATOSIS: ICD-10-CM

## 2024-09-05 DIAGNOSIS — R10.13 DYSPEPSIA: ICD-10-CM

## 2024-09-05 DIAGNOSIS — E88.1 LIPODYSTROPHY: ICD-10-CM

## 2024-09-05 DIAGNOSIS — Z23 ENCOUNTER FOR IMMUNIZATION: ICD-10-CM

## 2024-09-05 DIAGNOSIS — Z00.00 ANNUAL PHYSICAL EXAM: ICD-10-CM

## 2024-09-05 DIAGNOSIS — I10 ESSENTIAL HYPERTENSION: ICD-10-CM

## 2024-09-05 DIAGNOSIS — Z12.31 ENCOUNTER FOR SCREENING MAMMOGRAM FOR BREAST CANCER: ICD-10-CM

## 2024-09-05 DIAGNOSIS — R73.03 PREDIABETES: ICD-10-CM

## 2024-09-05 DIAGNOSIS — Z98.84 STATUS POST LAPAROSCOPIC SLEEVE GASTRECTOMY: ICD-10-CM

## 2024-09-05 DIAGNOSIS — E78.2 MIXED HYPERLIPIDEMIA: ICD-10-CM

## 2024-09-05 DIAGNOSIS — I71.21 ANEURYSM OF ASCENDING AORTA WITHOUT RUPTURE (HCC): Primary | ICD-10-CM

## 2024-09-05 PROBLEM — S63.501A SPRAIN OF RIGHT WRIST: Status: RESOLVED | Noted: 2022-01-31 | Resolved: 2024-09-05

## 2024-09-05 PROCEDURE — 90673 RIV3 VACCINE NO PRESERV IM: CPT | Performed by: FAMILY MEDICINE

## 2024-09-05 PROCEDURE — 99214 OFFICE O/P EST MOD 30 MIN: CPT | Performed by: FAMILY MEDICINE

## 2024-09-05 PROCEDURE — 3080F DIAST BP >= 90 MM HG: CPT | Performed by: FAMILY MEDICINE

## 2024-09-05 PROCEDURE — 3077F SYST BP >= 140 MM HG: CPT | Performed by: FAMILY MEDICINE

## 2024-09-05 PROCEDURE — 99396 PREV VISIT EST AGE 40-64: CPT | Performed by: FAMILY MEDICINE

## 2024-09-05 PROCEDURE — 90471 IMMUNIZATION ADMIN: CPT | Performed by: FAMILY MEDICINE

## 2024-09-05 RX ORDER — EZETIMIBE 10 MG/1
10 TABLET ORAL DAILY
Qty: 90 TABLET | Refills: 1 | Status: SHIPPED | OUTPATIENT
Start: 2024-09-05

## 2024-09-05 RX ORDER — AMLODIPINE BESYLATE 10 MG/1
10 TABLET ORAL DAILY
Qty: 90 TABLET | Refills: 3 | Status: SHIPPED | OUTPATIENT
Start: 2024-09-05 | End: 2024-12-04

## 2024-09-05 RX ORDER — ATORVASTATIN CALCIUM 80 MG/1
80 TABLET, FILM COATED ORAL DAILY
Qty: 90 TABLET | Refills: 2 | Status: SHIPPED | OUTPATIENT
Start: 2024-09-05 | End: 2024-12-04

## 2024-09-05 RX ORDER — METOPROLOL TARTRATE 25 MG/1
12.5 TABLET, FILM COATED ORAL EVERY 12 HOURS SCHEDULED
Qty: 90 TABLET | Refills: 1 | Status: SHIPPED | OUTPATIENT
Start: 2024-09-05

## 2024-09-05 NOTE — ASSESSMENT & PLAN NOTE
Patient was previously on metformin in the past  Recheck hemoglobin A1c  Recommend low carbohydrate diet

## 2024-09-05 NOTE — ASSESSMENT & PLAN NOTE
History of highly elevated LDL  Patient ran out of statin and Zetia  Refill statin and Zetia  Aim for LDL goal less than 70

## 2024-09-05 NOTE — PROGRESS NOTES
Adult Annual Physical  Name: Florencia Stewart      : 1970      MRN: [id number]  Encounter Provider: Rick Hua MD  Encounter Date: 2024   Encounter department: [facility]    Assessment & Plan   1. Aneurysm of ascending aorta without rupture (HCC)  Assessment & Plan:  -Stable  -History of 4.5 cm aortic aneurysm from CT scan of the chest 1 year ago  -Will obtain CT scan of the chest for monitoring of aneurysm  -Continue statin for lipid control  -Strict blood pressure control     Orders:  -     CT chest wo contrast; Future; Expected date: 2024  -     metoprolol tartrate (LOPRESSOR) 25 mg tablet; Take 0.5 tablets (12.5 mg total) by mouth every 12 (twelve) hours  -     CBC and differential; Future  -     Comprehensive metabolic panel; Future  2. Essential hypertension  Assessment & Plan:  Poorly controlled since patient ran out of medication  Refill provided for Norvasc and metoprolol  Recommend DASH diet  Orders:  -     amLODIPine (NORVASC) 10 mg tablet; Take 1 tablet (10 mg total) by mouth daily  -     CBC and differential; Future  -     Comprehensive metabolic panel; Future  3. Mixed hyperlipidemia  Assessment & Plan:  History of highly elevated LDL  Patient ran out of statin and Zetia  Refill statin and Zetia  Aim for LDL goal less than 70    Orders:  -     atorvastatin (LIPITOR) 80 mg tablet; Take 1 tablet (80 mg total) by mouth daily  -     Lipid panel; Future  -     ezetimibe (ZETIA) 10 mg tablet; Take 1 tablet (10 mg total) by mouth daily  4. Hepatic steatosis  Assessment & Plan:  Recommend weight loss  Follow-up with weight management outpatient  5. Prediabetes  Assessment & Plan:    Patient was previously on metformin in the past  Recheck hemoglobin A1c  Recommend low carbohydrate diet  Orders:  -     Hemoglobin A1C; Future  6. Lipodystrophy  -     atorvastatin (LIPITOR) 80 mg tablet; Take 1 tablet (80 mg total) by mouth daily  7.  Encounter for screening mammogram for breast cancer  -     Mammo screening bilateral w 3d and cad; Future; Expected date: 09/05/2024  8. Status post laparoscopic sleeve gastrectomy  -     Vitamin D 25 hydroxy; Future  -     Vitamin B12; Future  9. Dyspepsia  Assessment & Plan:  Chronic epigastric abdominal pain that is worse after meal  -Follow-up with GI outpatient  10. Encounter for immunization  -     influenza vaccine, recombinant, PF, 0.5 mL IM (Flublok)  11. Annual physical exam    Immunizations and preventive care screenings were discussed with patient today. Appropriate education was printed on patient's after visit summary.    Counseling:  Exercise: the importance of regular exercise/physical activity was discussed. Recommend exercise 3-5 times per week for at least 30 minutes.       Depression Screening and Follow-up Plan: Patient was screened for depression during today's encounter. They screened negative with a PHQ-2 score of 0.        History of Present Illness     Adult Annual Physical:  Patient presents for annual physical. 54-year-old female with a history of hyperlipidemia, prediabetes, who presents today for physical.  Patient recently lost her medical insurance and has been without medication for quite some time.  Patient has not taken her cholesterol or hypertension medication in a long time.  She now has her medical insurance.  She states that she wants to get her health back in order.  She admits that her diet is very poor.  She has chronic GI issues.  She gets epigastric abdominal pain and bloating shortly after she eats.  She has an appointment to see GI.  She wants to lose weight to improve her health.  She had bariatric surgery in the past.  She has an appointment coming up with weight management..     Diet and Physical Activity:  - Diet/Nutrition: poor diet.  - Exercise: no formal exercise.    Depression Screening:  - PHQ-2 Score: 0    General Health:  - Sleep: sleeps well.  - Hearing:  "normal hearing bilateral ears.  - Vision: goes for regular eye exams.  - Dental: brushes teeth once daily.    /GYN Health:  - Follows with GYN: yes.   - Menopause: postmenopausal.   - History of STDs: no    Advanced Care Planning:  - Has an advanced directive?: no    - Has a durable medical POA?: no    - ACP document given to patient?: no      Review of Systems   Constitutional:  Negative for chills, diaphoresis, fatigue, fever and unexpected weight change.   HENT:  Negative for congestion, hearing loss, rhinorrhea, sinus pressure, sinus pain, sneezing and sore throat.    Eyes:  Negative for visual disturbance.   Respiratory:  Negative for cough and shortness of breath.    Cardiovascular:  Negative for chest pain, palpitations and leg swelling.   Gastrointestinal:  Positive for abdominal pain. Negative for abdominal distention, blood in stool, constipation, nausea and vomiting.   Endocrine: Negative for polydipsia, polyphagia and polyuria.   Genitourinary:  Negative for dysuria, flank pain, frequency and urgency.   Musculoskeletal:  Positive for arthralgias. Negative for back pain and gait problem.   Skin:  Negative for rash.   Neurological:  Negative for dizziness, seizures, syncope, light-headedness and numbness.   Hematological:  Negative for adenopathy.   Psychiatric/Behavioral:  Negative for confusion and dysphoric mood. The patient is not nervous/anxious.          Objective     /100 (BP Location: Right arm, Patient Position: Sitting, Cuff Size: Standard)   Pulse 84   Temp 98 °F (36.7 °C) (Temporal)   Resp 16   Ht 5' 3\" (1.6 m)   Wt 88 kg (194 lb)   LMP 12/15/2018 (Approximate) Comment: Hx of tubal ligation  SpO2 97%   BMI 34.37 kg/m²     Physical Exam  Constitutional:       General: She is not in acute distress.     Appearance: Normal appearance. She is well-developed. She is obese. She is not ill-appearing, toxic-appearing or diaphoretic.   HENT:      Head: Normocephalic and atraumatic.      " Right Ear: Tympanic membrane, ear canal and external ear normal. There is no impacted cerumen.      Left Ear: Tympanic membrane, ear canal and external ear normal. There is no impacted cerumen.      Nose: Nose normal.      Mouth/Throat:      Pharynx: No oropharyngeal exudate or posterior oropharyngeal erythema.   Eyes:      General: No scleral icterus.        Right eye: No discharge.         Left eye: No discharge.      Extraocular Movements: Extraocular movements intact.      Pupils: Pupils are equal, round, and reactive to light.   Cardiovascular:      Rate and Rhythm: Normal rate and regular rhythm.      Heart sounds: Normal heart sounds. No murmur heard.     No friction rub. No gallop.   Pulmonary:      Effort: Pulmonary effort is normal. No respiratory distress.      Breath sounds: Normal breath sounds. No stridor. No wheezing or rhonchi.   Abdominal:      General: Bowel sounds are normal. There is no distension.      Palpations: Abdomen is soft. There is no mass.      Tenderness: There is no abdominal tenderness. There is no guarding.   Musculoskeletal:         General: Normal range of motion.      Cervical back: Normal range of motion.      Right lower leg: No edema.      Left lower leg: No edema.   Skin:     General: Skin is warm.      Capillary Refill: Capillary refill takes less than 2 seconds.      Findings: No lesion or rash.   Neurological:      General: No focal deficit present.      Mental Status: She is alert and oriented to person, place, and time.      Cranial Nerves: No cranial nerve deficit.      Sensory: No sensory deficit.      Motor: No weakness.      Gait: Gait normal.   Psychiatric:         Mood and Affect: Mood normal.         Behavior: Behavior normal.

## 2024-09-05 NOTE — PATIENT INSTRUCTIONS
"-Central scheduling-> [phone]  -Schedule CT scan and mammogram  Patient Education     Routine physical for adults   The Basics   Written by the doctors and editors at [facility]   What is a physical? -- A physical is a routine visit, or \"check-up,\" with your doctor. You might also hear it called a \"wellness visit\" or \"preventive visit.\"  During each visit, the doctor will:   Ask about your physical and mental health   Ask about your habits, behaviors, and lifestyle   Do an exam   Give you vaccines if needed   Talk to you about any medicines you take   Give advice about your health   Answer your questions  Getting regular check-ups is an important part of taking care of your health. It can help your doctor find and treat any problems you have. But it's also important for preventing health problems.  A routine physical is different from a \"sick visit.\" A sick visit is when you see a doctor because of a health concern or problem. Since physicals are scheduled ahead of time, you can think about what you want to ask the doctor.  How often should I get a physical? -- It depends on your age and health. In general, for people age 21 years and older:   If you are younger than 50 years, you might be able to get a physical every 3 years.   If you are 50 years or older, your doctor might recommend a physical every year.  If you have an ongoing health condition, like diabetes or high blood pressure, your doctor will probably want to see you more often.  What happens during a physical? -- In general, each visit will include:   Physical exam - The doctor or nurse will check your height, weight, heart rate, and blood pressure. They will also look at your eyes and ears. They will ask about how you are feeling and whether you have any symptoms that bother you.   Medicines - It's a good idea to bring a list of all the medicines you take to each doctor visit. Your doctor will talk to you about your medicines and answer any " "questions. Tell them if you are having any side effects that bother you. You should also tell them if you are having trouble paying for any of your medicines.   Habits and behaviors - This includes:   Your diet   Your exercise habits   Whether you smoke, drink alcohol, or use drugs   Whether you are sexually active   Whether you feel safe at home  Your doctor will talk to you about things you can do to improve your health and lower your risk of health problems. They will also offer help and support. For example, if you want to quit smoking, they can give you advice and might prescribe medicines. If you want to improve your diet or get more physical activity, they can help you with this, too.   Lab tests, if needed - The tests you get will depend on your age and situation. For example, your doctor might want to check your:   Cholesterol   Blood sugar   Iron level   Vaccines - The recommended vaccines will depend on your age, health, and what vaccines you already had. Vaccines are very important because they can prevent certain serious or deadly infections.   Discussion of screening - \"Screening\" means checking for diseases or other health problems before they cause symptoms. Your doctor can recommend screening based on your age, risk, and preferences. This might include tests to check for:   Cancer, such as breast, prostate, cervical, ovarian, colorectal, prostate, lung, or skin cancer   Sexually transmitted infections, such as chlamydia and gonorrhea   Mental health conditions like depression and anxiety  Your doctor will talk to you about the different types of screening tests. They can help you decide which screenings to have. They can also explain what the results might mean.   Answering questions - The physical is a good time to ask the doctor or nurse questions about your health. If needed, they can refer you to other doctors or specialists, too.  Adults older than 65 years often need other care, too. As you " get older, your doctor will talk to you about:   How to prevent falling at home   Hearing or vision tests   Memory testing   How to take your medicines safely   Making sure that you have the help and support you need at home  All topics are updated as new evidence becomes available and our peer review process is complete.  This topic retrieved from [employer] on: May 02, 2024.  Topic 311052 Version 1.0  Release: 32.4.3 - C32.122  © 2024 UpToDate, Inc. and/or its affiliates. All rights reserved.  Consumer Information Use and Disclaimer   Disclaimer: This generalized information is a limited summary of diagnosis, treatment, and/or medication information. It is not meant to be comprehensive and should be used as a tool to help the user understand and/or assess potential diagnostic and treatment options. It does NOT include all information about conditions, treatments, medications, side effects, or risks that may apply to a specific patient. It is not intended to be medical advice or a substitute for the medical advice, diagnosis, or treatment of a health care provider based on the health care provider's examination and assessment of a patient's specific and unique circumstances. Patients must speak with a health care provider for complete information about their health, medical questions, and treatment options, including any risks or benefits regarding use of medications. This information does not endorse any treatments or medications as safe, effective, or approved for treating a specific patient. UpToDate, Inc. and its affiliates disclaim any warranty or liability relating to this information or the use thereof.The use of this information is governed by the Terms of Use, available at https://www.wolterskluwer.com/en/know/clinical-effectiveness-terms. 2024© UpToDate, Inc. and its affiliates and/or licensors. All rights reserved.  Copyright   © 2024 UpToDate, Inc. and/or its affiliates. All rights reserved.    Patient  "Education     Diet and health   The Basics   Written by the doctors and editors at [facility]   Why is it important to eat a healthy diet? -- It's important to eat a healthy diet because eating the right foods can keep you healthy now and later in life. It can lower the risk of problems like heart disease, diabetes, high blood pressure, and some types of cancer. It can also help you live longer and improve your quality of life.  What kind of diet is best? -- There is no 1 specific diet that experts recommend for everyone. People choose what foods to eat for many different reasons. These include personal preference, culture, Synagogue, allergies or intolerances, and nutritional goals. People also need to consider the cost and availability of different foods.  In general, experts recommend a diet that:   Includes lots of vegetables, fruits, beans, nuts, and whole grains   Limits red and processed meats, unhealthy fats, sugar, salt, and alcohol  What are dietary patterns? -- A dietary \"pattern\" means generally eating certain types of foods while limiting others. Some people need to follow a specific dietary pattern because of their health needs. For example, if you have high blood pressure, your doctor might recommend a diet low in salt.  If you are trying to improve your health in general, choosing a healthy dietary pattern can help. This does not have to mean being very strict about what you eat or avoid. The goal is to think about getting plenty of healthy foods while limiting less healthy foods.  Examples of dietary patterns include:   Mediterranean diet - This involves eating a lot of fruits, vegetables, nuts, and whole grains, and uses olive oil instead of other fats. It also includes some fish, poultry, and dairy products, but not a lot of red meat. Following this diet can help your overall health, and might even lower your risk of having a stroke.   Plant-based diets - These patterns focus on vegetables, fruits, " "grains, beans, and nuts. They limit or avoid food that comes from animals, such as meat and dairy. There are different types of plant-based diets, including vegetarian and vegan.   Low-fat diet - A low-fat diet involves limiting calories from fat. This might help some people keep weight off if that is their goal, but it does not have many other health benefits. If you choose to follow a low-fat diet, it is also important to focus on getting lots of whole grains, legumes, fruits, and vegetables. Limit refined grains and sugar.   Low-cholesterol diet - Cholesterol is found in foods with a lot of saturated fat, like red meat, butter, and cheese. A low-cholesterol diet focuses on limiting the amount of cholesterol that you eat. Limiting the cholesterol in your diet can also help lower the amount of unhealthy fats that you eat.  Which foods are especially healthy? -- Foods that are especially healthy include:   Fruits and vegetables - Eating a diet with lots of fruits and vegetables can help prevent heart disease and stroke. It might also help prevent certain types of cancer. Try to eat fruits and vegetables at each meal and also for snacks. If you don't have fresh fruits and vegetables available, you can eat frozen or canned ones instead. Doctors recommend eating at least 5 servings of fruits or vegetables each day.   Whole grains - Whole-grain foods include 100 percent whole-wheat bread, steel cut oats, and whole-grain pasta. These are healthier than foods made with \"refined\" grains, like white bread and white rice. Eating lots of whole grains instead of refined grains has been shown to help with weight control. It can also lower the risk of several health problems, including colon cancer, heart disease, and diabetes. Doctors recommend that most people try to eat 5 to 8 servings of whole-grain, high-fiber foods each day.   Foods with fiber - Eating foods with a lot of fiber can help prevent heart disease and stroke. If " "you have type 2 diabetes, it can also help control your blood sugar. Foods that have a lot of fiber include vegetables, fruits, beans, nuts, oatmeal, and whole-grain breads and cereals. You can tell how much fiber is in a food by reading the nutrition label (figure 1). Doctors recommend that most people eat about 25 to 34 grams of fiber each day.   Foods with calcium and vitamin D - Babies, children, and adults need calcium and vitamin D to help keep their bones strong. Adults also need calcium and vitamin D to help prevent osteoporosis. Osteoporosis is a condition that causes bones to get \"thin\" and break more easily than usual. Different foods and drinks have calcium and vitamin D in them (figure 2). People who don't get enough calcium and vitamin D in their diet might need to take a supplement. Doctors recommend that most people have 2 to 3 servings of foods with calcium and vitamin D each day.   Foods with protein - Protein helps your muscles and bones stay strong. Healthy foods with a lot of protein include chicken, fish, eggs, beans, nuts, and soy products. Red meat also has a lot of protein, but it also contains fats, which can be unhealthy. Doctors recommend that most people try to eat about 5 servings of protein each day.   Healthy fats - There are different types of fats. Some types of fats are better for your body than others. Healthy fats are \"monounsaturated\" or \"polyunsaturated\" fats. These are found in fatty fish, nuts and nut butters, and avocados. Use plant-based oils when cooking. Examples of these oils include olive, canola, safflower, sunflower, and corn oil. Eating foods with healthy fats, while avoiding or limiting foods with unhealthy fats, might lower the risk of heart disease.   Foods with folate - Folate is a vitamin that is important for pregnant people, since it helps prevent certain birth defects. It is also called \"folic acid.\" Anyone who could get pregnant should get at least 400 " "micrograms of folic acid daily, whether or not they are actively trying to get pregnant. Folate is found in many breakfast cereals, oranges, orange juice, and green leafy vegetables.  What foods should I avoid or limit? -- To eat a healthy diet, there are some things that you should avoid or limit. They include:   Unhealthy fats - \"Trans\" fats are especially unhealthy. They are found in margarines, many fast foods, and some store-bought baked goods. \"Saturated\" fats are found in animal products like meats, egg yolks, butter, cheese, and full-fat milk products. Unhealthy fats can raise your cholesterol level and increase your chance of getting heart disease.   Sugar - To have a healthy diet, it's important to limit or avoid added sugar, sweets, and refined grains. Refined grains are found in white bread, white rice, most pastas, and most packaged \"snack\" foods.  Avoiding sugar-sweetened beverages, like soda and sports drinks, can also help improve your health.  Avoid canned fruits in \"heavy\" syrup.   Red and processed meats - Studies have shown that eating a lot of red meat can increase your risk of certain health problems, including heart disease and cancer. You should limit the amount of red meat that you eat. This is also true for processed meats like sausage, hot dogs, and crystal.  Can I drink alcohol as part of a healthy diet? -- Not drinking alcohol at all is the healthiest choice. Regular drinking can raise a person's chances of getting liver disease and certain types of cancers. In females, even 1 drink a day can increase the risk of getting breast cancer.  If you do choose to drink, most doctors recommend limiting alcohol to no more than:   1 drink a day for females   2 drinks a day for males  The limits are different because, generally, the female body takes longer to break down alcohol.  How many calories do I need each day? -- Calories give your body energy. The number of calories that you need each day " "depends on your weight, height, age, sex, and how active you are.  Your doctor or nurse can tell you about how many calories you should eat each day. You can also work with a dietitian (nutrition expert) to learn more about your dietary needs and options.  What if I am having trouble improving my diet? -- It can be hard to change the way that you eat. Remember that even small changes can improve your health.  Here are some tips that might help:   Try to make fruits and vegetables part of every meal. If you don't have fresh fruits and vegetables, frozen or canned are good options. Look for products without added salt or sugar.   Keep a bowl of fruit out for snacking.   When you can, choose whole grains instead of refined grains. Choose chicken, fish, and beans instead of red meat and cheese.   Try to eat prepared and processed foods less often.   Try flavored seltzer or water instead of soda or juice.   When eating at fast food restaurants, look for healthier items, like broiled chicken or salad.  If you have questions about which foods you should or should not eat, ask your doctor, nurse, or dietitian. The right diet for you will depend, in part, on your health and any medical conditions you have.  All topics are updated as new evidence becomes available and our peer review process is complete.  This topic retrieved from [employer] on: Feb 28, 2024.  Topic 15341 Version 28.0  Release: 32.2.4 - C32.58  © 2024 UpToDate, Inc. and/or its affiliates. All rights reserved.  figure 1: Nutrition label - Fiber     This is an example of a nutrition label. To figure out how much fiber is in a food, look for the line that says \"Dietary Fiber.\" It's also important to look at the serving size. This food has 7 grams of fiber in each serving, and each serving is 1 cup.  Graphic 25330 Version 8.0  figure 2: Foods and drinks with calcium and vitamin D     Foods rich in calcium include ice cream, soy milk, breads, kale, broccoli, milk, " "cheese, cottage cheese, almonds, yogurt, ready-to-eat cereals, beans, and tofu. Foods rich in vitamin D include milk, fortified plant-based \"milks\" (soy, almond), canned tuna fish, cod liver oil, yogurt, ready-to-eat-cereals, cooked salmon, canned sardines, mackerel, and eggs. Some of these foods are rich in both.  Graphic 11323 Version 4.0  Consumer Information Use and Disclaimer   Disclaimer: This generalized information is a limited summary of diagnosis, treatment, and/or medication information. It is not meant to be comprehensive and should be used as a tool to help the user understand and/or assess potential diagnostic and treatment options. It does NOT include all information about conditions, treatments, medications, side effects, or risks that may apply to a specific patient. It is not intended to be medical advice or a substitute for the medical advice, diagnosis, or treatment of a health care provider based on the health care provider's examination and assessment of a patient's specific and unique circumstances. Patients must speak with a health care provider for complete information about their health, medical questions, and treatment options, including any risks or benefits regarding use of medications. This information does not endorse any treatments or medications as safe, effective, or approved for treating a specific patient. UpToDate, Inc. and its affiliates disclaim any warranty or liability relating to this information or the use thereof.The use of this information is governed by the Terms of Use, available at https://www.wolterskluwer.com/en/know/clinical-effectiveness-terms. 2024© UpToDate, Inc. and its affiliates and/or licensors. All rights reserved.  Copyright   © 2024 UpToDate, Inc. and/or its affiliates. All rights reserved.    Patient Education     Low-fat diet   The Basics   Written by the doctors and editors at [employer]   What are fats? -- The fat in the food you eat is often classified " "into 2 groups:   \"Healthy\" fats - These are monounsaturated or polyunsaturated fats. They tend to be more liquid at room temperature. Healthy fats are found in things like olive oil, canola oil, and sesame oil. They are also found in nuts, seeds, avocados, and nut butters.   \"Unhealthy\" fats - These are saturated and trans fats. Saturated fats are animal fats. Trans fats are artificial fats, like partially hydrogenated oils. These fats raise your cholesterol. Unhealthy fats tend to be more solid at room temperature. They are found in meats, egg yolks, butter, cheese, and full-fat milk products. They are also found in some fried foods, butter, margarine, and baked goods like cookies or cakes.  Why do I need a low-fat diet? -- The amounts and kinds of fats you eat can affect your health. This is especially true if you have specific conditions such as blocked arteries or gallbladder problems. Eating fewer unhealthy fats is 1 way to improve your health.  Some people try to eat less fat because they want to lose weight or avoid gaining weight. But this does not always work. That's because weight gain is related to how many calories you eat, no matter what foods they come from. Eating fewer unhealthy fats can be an important part of a weight loss plan. But it's also important to be aware of how many calories you are getting from other foods.  What can I eat and drink on a low-fat diet? -- Choose foods with healthy fats.  Foods with healthy fats include:   Canola, peanut, and olive oil   Safflower, sunflower, soybean, and corn oil   Walnuts, almonds, pecans, hazelnuts, cashews, and peanuts   Pumpkin, sesame, flax, and sunflower seeds   [place], tuna, and some other fish   Tofu   Soy milk   Avocado  Other healthy foods with lower amounts of fats include:   Fat-free (non-fat) or low-fat milk, yogurt, and cheese   Light, low-fat or fat-free cream cheese and sour cream   Dried beans, lentils, and tofu   Fruits and " "vegetables   Whole-grain breads, cereals, pastas, and rice   High-fiber foods, like oatmeal, fruits, beans, and nuts. These have soluble fiber, which helps lower cholesterol in the body.   Deli ham, turkey, chicken breast, and lean roast beef  What foods and drinks should I limit on a low-fat diet? -- It is important to limit certain foods with unhealthy fats.  Limit these types of foods that have saturated fats:   Whole-fat dairy products like cheese, ice cream, whole milk, and cream   High-fat meats like beef, lamb, poultry with the skin, crystal, hot dogs, and sausage   Processed deli meats like bologna, pepperoni, and salami   Butter and lard   Palm and coconut oils   Mayonnaise, salad dressings, gravies, and sauces  Limit these types of foods that might have trans fats:   Granola, candy, and baked goods like cookies, cakes, doughnuts, and muffins   Pizza dough, and pie crusts that are packaged   Fried foods   Frozen dinners   Chips and crackers   Microwave popcorn   Stick margarine and vegetable shortenings  What else should I know? -- You do not have to remove all fat from your diet. Instead, pay attention to the amount and kinds of fats that you eat.   Read the labels of store-bought foods (figure 1) to find out how much fat is in each. Under 5 percent of total fat on a label means that it is \"low fat.\" Over 20 percent of total fat on a label means that it is \"high fat.\" Avoid foods with \"partially hydrogenated oil\" in the ingredient list. This means that there is trans fat in the food.   Change how you cook to help lower the amount of fat in your food:   Remove the fatty parts of meat and the skin from poultry before cooking   Bake, broil, grill, poach, or roast poultry, fish, and lean meats   Drain and throw away the fat that comes out of meat as you cook it   Try to add little or no fat to foods   Use olive or canola oil for cooking or baking   Steam your vegetables   Use herbs or no-oil marinades to flavor " "foods  All topics are updated as new evidence becomes available and our peer review process is complete.  This topic retrieved from [employer] on: Mar 13, 2024.  Topic 339505 Version 4.0  Release: 32.2.4 - C32.71  © 2024 UpToDate, Inc. and/or its affiliates. All rights reserved.  figure 1: Nutrition label - Fats     This is an example of a nutrition label. To figure out how much and what kinds of fats are in a food, look for the lines that say \"Saturated Fat\" and \"Trans Fat.\" It's also important to look at the serving size. This food has 3 grams of saturated fat and 2 grams of trans fat in each serving, and each serving is 2 tablespoons (32 grams).  Graphic 051453 Version 1.0  Consumer Information Use and Disclaimer   Disclaimer: This generalized information is a limited summary of diagnosis, treatment, and/or medication information. It is not meant to be comprehensive and should be used as a tool to help the user understand and/or assess potential diagnostic and treatment options. It does NOT include all information about conditions, treatments, medications, side effects, or risks that may apply to a specific patient. It is not intended to be medical advice or a substitute for the medical advice, diagnosis, or treatment of a health care provider based on the health care provider's examination and assessment of a patient's specific and unique circumstances. Patients must speak with a health care provider for complete information about their health, medical questions, and treatment options, including any risks or benefits regarding use of medications. This information does not endorse any treatments or medications as safe, effective, or approved for treating a specific patient. UpToDate, Inc. and its affiliates disclaim any warranty or liability relating to this information or the use thereof.The use of this information is governed by the Terms of Use, available at " https://www.woltersSolarReserveuwer.com/en/know/clinical-effectiveness-terms. 2024© [employer] and its affiliates and/or licensors. All rights reserved.  Copyright   © 2024 [employer] and/or its affiliates. All rights reserved.

## 2024-09-05 NOTE — ASSESSMENT & PLAN NOTE
-Stable  -History of 4.5 cm aortic aneurysm from CT scan of the chest 1 year ago  -Will obtain CT scan of the chest for monitoring of aneurysm  -Continue statin for lipid control  -Strict blood pressure control

## 2024-09-05 NOTE — ASSESSMENT & PLAN NOTE
Poorly controlled since patient ran out of medication  Refill provided for Norvasc and metoprolol  Recommend DASH diet

## 2024-09-15 NOTE — PROGRESS NOTES
[facility] - Outpatient Follow-up Note  Florencia Stewart 54 y.o. female MRN: [id number]  Encounter: [id number]          ASSESSMENT AND PLAN:    I have personally reviewed the pertinent lab values, radiology images, imaging reports and endoscopy/pathology reports.    Problem List Items Addressed This Visit    None  Visit Diagnoses       Bloating    -  Primary    Abnormal physical evaluation        Relevant Orders    US abdomen complete          Bloating  Bloating without change in bowel habits. EGD in 2023 unremarkable. Reports not following post-surgical sleeve gastrectomy diet. Likely related to post-surgical sleeve gastrectomy. Ddx includes aerophagia, functional dyspepsia. Difficult to assess for gastroparesis given hx of sleeve gastrectomy.  Given induration on her abdominal exam, will obtain US to further evaluate the abnormality on her abdominal wall. Reviewed CT imaging from last year but patient reports not noticing this abnormality back then.   - complete SIBO test as discussed in prior visit  - start small frequent diet  - avoid overeating    RTC in 4 months    ______________________________________________________________________    HPI:  54F PMH sleeve gastrectomy (2018), H. Pylori s/p quad therapy w/ eradication, HTN, HLD here for f/u    Last seen by HEMANT Lovett on 1/31/2024 re: fatty liver, bloating s/p SIBO test    Did not do SIBO test because of insurance issue.   Bloating for about 1-2 years. After eating solids and liquids.  Sleeve gastrectomy in 2018 per chart review. Lost 40 lbs afterwards.  Not following her post-surgical diet. Vomiting sometimes when eating fried food or too much. Drinks a lot of water, no carbonated beverage.   Took Advil intermittently.   Noticed induration above her umbilicus about 6 months ago where she points as a bloating sensation.   Most recent abd surgery was about 2 yrs ago when she had her tummy tuck  Tummy tuck 2 yesrs ago  "  Denies dysphagia, odynophagia, heartburn, nausea  Denies diarrhea, constipation, BRBPR, melena      REVIEW OF SYSTEMS (ROS):   All other systems were negative unless specified in HPI.     Historical Information   Past Medical History:   Diagnosis Date    Anemia     4/1/22 Pt reports hx of anemia - no current issues at this time     Anesthesia     4/1/22 Pt reports her mother had reaction to anesthesia - reports her mother \"didnt wake up for two weeks\"     Aortic aneurysm (HCC)     Breast cancer (HCC) 01/20/2017    left side    Depression     4/1/22 Hx of depression - resolved per pt at this time     Diabetes mellitus (HCC)     GERD (gastroesophageal reflux disease)     History of chemotherapy 2017    chest radiation    History of COVID-19     4/1/22 Pt reports COVID in 2021    Hyperlipidemia     Hypertension     Left wrist pain 07/22/2019    Migraine     Obesity     Post medical management with Phentermine/topiramate     Prediabetes     Sleep apnea     Hx of sleep apnea - using CPAP in the past but since gastric surgery no further issues     Tendinitis     4/1/22 Pt reports right hand tendinitis      Past Surgical History:   Procedure Laterality Date    ABDOMINOPLASTY N/A 04/06/2022    Procedure: ABDOMINOPLASTY;  Surgeon: Blade Whitfield MD;  Location:  MAIN OR;  Service: Plastics    BREAST BIOPSY Left 12/06/2016    stereo    BREAST LUMPECTOMY W/ NEEDLE LOCALIZATION Left 01/20/2017    RADIATION,HORMONE THERAPY  (DCIS LEFT BREAST)    COLONOSCOPY      GASTRECTOMY SLEEVE LAPAROSCOPIC      LIPOSUCTION  12/2023    OR EXCISION SKIN ABD INFRAUMBILICAL PANNICULECTOMY N/A 04/06/2022    Procedure: LIPECTOMY;  Surgeon: Blade Whitfield MD;  Location:  MAIN OR;  Service: Plastics    OR MASTOPEXY Bilateral 10/06/2022    Procedure: MASTOPEXY BREAST;  Surgeon: Blade Whitfield MD;  Location:  MAIN OR;  Service: Plastics    REDUCTION MAMMAPLASTY      TUBAL LIGATION      WRIST SURGERY Right      Social History   Social History " "    Substance and Sexual Activity   Alcohol Use Not Currently    Comment: occasionally     Social History     Substance and Sexual Activity   Drug Use Never    Comment: Denies any former or current use      Social History     Tobacco Use   Smoking Status Never    Passive exposure: Never   Smokeless Tobacco Never   Tobacco Comments    Denies former or current use     Family History   Problem Relation Age of Onset    Hyperlipidemia Mother     Diabetes Mother     Hypertension Mother     Colon cancer Father     Liver cancer Father     Hypertension Sister     No Known Problems Sister     No Known Problems Sister     Hypertension Brother     No Known Problems Maternal Grandmother     No Known Problems Maternal Grandfather     Skin cancer Paternal Grandmother     Lymphoma Paternal Grandfather     No Known Problems Maternal Aunt     No Known Problems Maternal Aunt     No Known Problems Maternal Aunt     No Known Problems Maternal Aunt     No Known Problems Paternal Aunt     No Known Problems Paternal Aunt     No Known Problems Paternal Aunt     No Known Problems Paternal Aunt     No Known Problems Daughter     No Known Problems Daughter     No Known Problems Daughter     Breast cancer Neg Hx        Meds/Allergies       Current Outpatient Medications:     amLODIPine (NORVASC) 10 mg tablet    atorvastatin (LIPITOR) 80 mg tablet    metoprolol tartrate (LOPRESSOR) 25 mg tablet    Current Facility-Administered Medications:     Inclisiran Sodium (LEQVIO) subcutaneous injection 284 mg, 284 mg, Subcutaneous, Once    No Known Allergies        Objective     Blood pressure 122/82, pulse 79, temperature 97.7 °F (36.5 °C), temperature source Tympanic, height 5' 3\" (1.6 m), weight 90.8 kg (200 lb 3.2 oz), last menstrual period 12/15/2018, SpO2 99%, not currently breastfeeding. Body mass index is 35.46 kg/m².        PHYSICAL EXAM:      General Appearance:   Alert, cooperative, no distress   HEENT:   Normocephalic, atraumatic, anicteric.   "   Lungs:   Respirations unlabored    Heart::   Regular rate and rhythm   Abdomen:   Soft, non-tender, non-distended; 5 cm induration above her umbilicus in LUQ    Rectal:   NA   Extremities:  No edema    Skin:  No jaundice, rashes, or lesions      Lab Results:    Result Date  Result Date   WBC 5.87 12/4/2023 Iron No results in last 5 years No results in last 5 years   Hgb 13.0 12/4/2023 TSAT No results in last 5 years No results in last 5 years   MCV 89 12/4/2023 Ferritin No results in last 5 years No results in last 5 years   Plt 280 12/4/2023 Vit D 30.7 5/25/2022   Na 140 12/4/2023 HbA1c 6.1 12/4/2023   K 4.2 12/4/2023 TSH 2.590 9/27/2021   Cr 0.76 12/4/2023 Ca 9.8 12/4/2023   AST 22 4/14/2023      ALT 24 4/14/2023      Alk phos 123 4/14/2023      Alb 4.6 4/14/2023      TBili 0.49 4/14/2023      DBili No results in last 5 years No results in last 5 years      GGT No results in last 5 years No results in last 5 years      INR 0.88 12/4/2023              Radiology Results:   US elastography 8/26/2023  IMPRESSION:     Metavir score: F0 - F1, Absent or Mild Fibrosis     According to the updated SRU consensus statement, a liver stiffness of 6.99 kPa, which in the absence of other known clinical signs*, rules out compensated advanced chronic liver disease (cACLD). If there are known clinical signs, may need further test   for confirmation.  https://pubs.rsna.org/doi/10.1148/radiol.4155514820     Liver steatosis grading:  S3 ( > 67% steatosis)    Endoscopy Reports:   EGD 4/24/2023  IMPRESSION:  Prior sleeve gastrectomy.   Possible small hiatal hernia.   C0M1 (possible) tongue of [place]'s - biopsies done  Gastric and duodenal biopsies done.  Final Diagnosis   A.  Duodenum (biopsy):     - Small bowel mucosa with no significant pathologic abnormalities.     - No villous atrophy, increased intraepithelial lymphocytes or crypt hyperplasia to suggest malabsorptive enteropathy.     - No active inflammation, granulomas,  organisms, dysplasia or neoplasia identified.     B.  Stomach (biopsy):     - Helicobacter-associated active chronic gastritis involving oxyntic and foveolar mucosa.     - Immunostain for Helicobacter is positive (control stains appropriately).     - Few crushed glands noted, favor procedural artifact.     - No intestinal metaplasia.     C.  Esophagogastric junction (biopsy):     - Cardiac gastric and squamous junctional mucosa with mild subacute and chronic inflammation.     - Squamous eosinophils number less than 2 per HPF.     - No intestinal metaplasia, dysplasia or neoplasia identified.       Colonoscopy 6/14/2021  IMPRESSION:  Normal colonoscopy      RECOMMENDATION:  Repeat screening colonoscopy in 10 years .

## 2024-09-16 ENCOUNTER — OFFICE VISIT (OUTPATIENT)
Dept: GASTROENTEROLOGY | Facility: MEDICAL CENTER | Age: 54
End: 2024-09-16
Payer: COMMERCIAL

## 2024-09-16 VITALS
SYSTOLIC BLOOD PRESSURE: 122 MMHG | DIASTOLIC BLOOD PRESSURE: 82 MMHG | OXYGEN SATURATION: 99 % | BODY MASS INDEX: 35.47 KG/M2 | TEMPERATURE: 97.7 F | WEIGHT: 200.2 LBS | HEART RATE: 79 BPM | HEIGHT: 63 IN

## 2024-09-16 DIAGNOSIS — Z00.01 ABNORMAL PHYSICAL EVALUATION: ICD-10-CM

## 2024-09-16 DIAGNOSIS — R14.0 BLOATING: Primary | ICD-10-CM

## 2024-09-16 PROCEDURE — 99213 OFFICE O/P EST LOW 20 MIN: CPT | Performed by: INTERNAL MEDICINE

## 2024-09-24 ENCOUNTER — OFFICE VISIT (OUTPATIENT)
Dept: URGENT CARE | Age: 54
End: 2024-09-24
Payer: COMMERCIAL

## 2024-09-24 ENCOUNTER — APPOINTMENT (OUTPATIENT)
Dept: LAB | Facility: HOSPITAL | Age: 54
End: 2024-09-24
Payer: COMMERCIAL

## 2024-09-24 VITALS
OXYGEN SATURATION: 97 % | HEART RATE: 67 BPM | HEIGHT: 63 IN | TEMPERATURE: 97 F | RESPIRATION RATE: 16 BRPM | WEIGHT: 200 LBS | BODY MASS INDEX: 35.44 KG/M2 | SYSTOLIC BLOOD PRESSURE: 128 MMHG | DIASTOLIC BLOOD PRESSURE: 87 MMHG

## 2024-09-24 DIAGNOSIS — R73.03 PREDIABETES: ICD-10-CM

## 2024-09-24 DIAGNOSIS — I10 ESSENTIAL HYPERTENSION: ICD-10-CM

## 2024-09-24 DIAGNOSIS — K76.0 FATTY LIVER: ICD-10-CM

## 2024-09-24 DIAGNOSIS — I71.21 ANEURYSM OF ASCENDING AORTA WITHOUT RUPTURE (HCC): ICD-10-CM

## 2024-09-24 DIAGNOSIS — Z98.84 STATUS POST LAPAROSCOPIC SLEEVE GASTRECTOMY: ICD-10-CM

## 2024-09-24 DIAGNOSIS — E78.2 MIXED HYPERLIPIDEMIA: ICD-10-CM

## 2024-09-24 DIAGNOSIS — R10.10 UPPER ABDOMINAL PAIN: Primary | ICD-10-CM

## 2024-09-24 LAB
25(OH)D3 SERPL-MCNC: 17 NG/ML (ref 30–100)
ALBUMIN SERPL BCG-MCNC: 4.5 G/DL (ref 3.5–5)
ALP SERPL-CCNC: 101 U/L (ref 34–104)
ALT SERPL W P-5'-P-CCNC: 24 U/L (ref 7–52)
ANION GAP SERPL CALCULATED.3IONS-SCNC: 13 MMOL/L (ref 4–13)
AST SERPL W P-5'-P-CCNC: 21 U/L (ref 13–39)
BASOPHILS # BLD AUTO: 0.06 THOUSANDS/ΜL (ref 0–0.1)
BASOPHILS NFR BLD AUTO: 1 % (ref 0–1)
BILIRUB DIRECT SERPL-MCNC: 0.1 MG/DL (ref 0–0.2)
BILIRUB SERPL-MCNC: 0.46 MG/DL (ref 0.2–1)
BUN SERPL-MCNC: 17 MG/DL (ref 5–25)
CALCIUM SERPL-MCNC: 9.8 MG/DL (ref 8.4–10.2)
CHLORIDE SERPL-SCNC: 103 MMOL/L (ref 96–108)
CHOLEST SERPL-MCNC: 276 MG/DL
CO2 SERPL-SCNC: 23 MMOL/L (ref 21–32)
CREAT SERPL-MCNC: 0.84 MG/DL (ref 0.6–1.3)
EOSINOPHIL # BLD AUTO: 0.07 THOUSAND/ΜL (ref 0–0.61)
EOSINOPHIL NFR BLD AUTO: 1 % (ref 0–6)
ERYTHROCYTE [DISTWIDTH] IN BLOOD BY AUTOMATED COUNT: 12.7 % (ref 11.6–15.1)
EST. AVERAGE GLUCOSE BLD GHB EST-MCNC: 134 MG/DL
GFR SERPL CREATININE-BSD FRML MDRD: 79 ML/MIN/1.73SQ M
GLUCOSE P FAST SERPL-MCNC: 104 MG/DL (ref 65–99)
HBA1C MFR BLD: 6.3 %
HCT VFR BLD AUTO: 41.8 % (ref 34.8–46.1)
HDLC SERPL-MCNC: 46 MG/DL
HGB BLD-MCNC: 14.1 G/DL (ref 11.5–15.4)
IMM GRANULOCYTES # BLD AUTO: 0.04 THOUSAND/UL (ref 0–0.2)
IMM GRANULOCYTES NFR BLD AUTO: 1 % (ref 0–2)
LDLC SERPL CALC-MCNC: 187 MG/DL (ref 0–100)
LYMPHOCYTES # BLD AUTO: 2.5 THOUSANDS/ΜL (ref 0.6–4.47)
LYMPHOCYTES NFR BLD AUTO: 43 % (ref 14–44)
MCH RBC QN AUTO: 28.9 PG (ref 26.8–34.3)
MCHC RBC AUTO-ENTMCNC: 33.7 G/DL (ref 31.4–37.4)
MCV RBC AUTO: 86 FL (ref 82–98)
MONOCYTES # BLD AUTO: 0.45 THOUSAND/ΜL (ref 0.17–1.22)
MONOCYTES NFR BLD AUTO: 8 % (ref 4–12)
NEUTROPHILS # BLD AUTO: 2.74 THOUSANDS/ΜL (ref 1.85–7.62)
NEUTS SEG NFR BLD AUTO: 46 % (ref 43–75)
NONHDLC SERPL-MCNC: 230 MG/DL
NRBC BLD AUTO-RTO: 0 /100 WBCS
PLATELET # BLD AUTO: 271 THOUSANDS/UL (ref 149–390)
PMV BLD AUTO: 11.2 FL (ref 8.9–12.7)
POTASSIUM SERPL-SCNC: 4 MMOL/L (ref 3.5–5.3)
PROT SERPL-MCNC: 7.9 G/DL (ref 6.4–8.4)
RBC # BLD AUTO: 4.88 MILLION/UL (ref 3.81–5.12)
SODIUM SERPL-SCNC: 139 MMOL/L (ref 135–147)
TRIGL SERPL-MCNC: 217 MG/DL
VIT B12 SERPL-MCNC: 460 PG/ML (ref 180–914)
WBC # BLD AUTO: 5.86 THOUSAND/UL (ref 4.31–10.16)

## 2024-09-24 PROCEDURE — 85025 COMPLETE CBC W/AUTO DIFF WBC: CPT

## 2024-09-24 PROCEDURE — 36415 COLL VENOUS BLD VENIPUNCTURE: CPT

## 2024-09-24 PROCEDURE — 99213 OFFICE O/P EST LOW 20 MIN: CPT | Performed by: FAMILY MEDICINE

## 2024-09-24 PROCEDURE — 80061 LIPID PANEL: CPT

## 2024-09-24 PROCEDURE — 83036 HEMOGLOBIN GLYCOSYLATED A1C: CPT

## 2024-09-24 PROCEDURE — 82306 VITAMIN D 25 HYDROXY: CPT

## 2024-09-24 PROCEDURE — 82248 BILIRUBIN DIRECT: CPT

## 2024-09-24 PROCEDURE — 82607 VITAMIN B-12: CPT

## 2024-09-24 PROCEDURE — 80053 COMPREHEN METABOLIC PANEL: CPT

## 2024-09-24 NOTE — PROGRESS NOTES
[facility]        NAME: Florencia Stewart is a 54 y.o. female  : 1970    MRN: [id number]  DATE: 2024  TIME: 3:34 PM      Assessment and Plan     Upper abdominal pain [R10.10]  1. Upper abdominal pain  Transfer to other facility          Patient agreeable to proceed to the emergency department for further evaluation.  Visitor at bedside agreeable to drive patient.  Will go to [facility] for further evaluation.  Stable at time of leaving care now  Patient Instructions     Proceed to the ER for further evaluation.       Chief Complaint     Chief Complaint   Patient presents with    Abdominal Pain     Pt presents with abdominal pain for the past 2 months pt says she feels like her stomach is tight and feels as if there is a ball in her stomach.          History of Present Illness     Patient is a 54-year-old female who presents with worsening upper abdominal pain.  States symptoms initially started 2 months ago but have worsened.  Patient did see GI on  and an ultrasound was ordered for induration of left upper quadrant.  Patient reports increased bloating and distention.  Denies chest pain, shortness of breath, nausea, vomiting, constipation, or fever.  Denies urinary symptoms.    Abdominal Pain  Pertinent negatives include no constipation, diarrhea, dysuria, fever, hematuria, nausea or vomiting.       Review of Systems     Review of Systems   Constitutional:  Negative for fever.   Respiratory:  Negative for shortness of breath.    Cardiovascular:  Negative for chest pain.   Gastrointestinal:  Positive for abdominal distention and abdominal pain. Negative for constipation, diarrhea, nausea and vomiting.   Genitourinary:  Negative for dysuria and hematuria.   All other systems reviewed and are negative.        Current Medications       Current Outpatient Medications:     amLODIPine (NORVASC) 10 mg tablet, Take 1 tablet (10 mg total) by mouth daily, Disp: 90 tablet,  "Rfl: 3    atorvastatin (LIPITOR) 80 mg tablet, Take 1 tablet (80 mg total) by mouth daily, Disp: 90 tablet, Rfl: 2    metoprolol tartrate (LOPRESSOR) 25 mg tablet, Take 0.5 tablets (12.5 mg total) by mouth every 12 (twelve) hours, Disp: 90 tablet, Rfl: 1    Current Facility-Administered Medications:     Inclisiran Sodium (LEQVIO) subcutaneous injection 284 mg, 284 mg, Subcutaneous, Once, Dominguez Bucio, DO    Current Allergies     Allergies as of 09/24/2024    (No Known Allergies)              The following portions of the patient's history were reviewed and updated as appropriate: allergies, current medications, past family history, past medical history, past social history, past surgical history and problem list.     Past Medical History:   Diagnosis Date    Anemia     4/1/22 Pt reports hx of anemia - no current issues at this time     Anesthesia     4/1/22 Pt reports her mother had reaction to anesthesia - reports her mother \"didnt wake up for two weeks\"     Aortic aneurysm (HCC)     Breast cancer (HCC) 01/20/2017    left side    Depression     4/1/22 Hx of depression - resolved per pt at this time     Diabetes mellitus (HCC)     GERD (gastroesophageal reflux disease)     History of chemotherapy 2017    chest radiation    History of COVID-19     4/1/22 Pt reports COVID in 2021    Hyperlipidemia     Hypertension     Left wrist pain 07/22/2019    Migraine     Obesity     Post medical management with Phentermine/topiramate     Prediabetes     Sleep apnea     Hx of sleep apnea - using CPAP in the past but since gastric surgery no further issues     Tendinitis     4/1/22 Pt reports right hand tendinitis        Past Surgical History:   Procedure Laterality Date    ABDOMINOPLASTY N/A 04/06/2022    Procedure: ABDOMINOPLASTY;  Surgeon: Blade Whitfield MD;  Location:  MAIN OR;  Service: Plastics    BREAST BIOPSY Left 12/06/2016    stereo    BREAST LUMPECTOMY W/ NEEDLE LOCALIZATION Left 01/20/2017    " "RADIATION,HORMONE THERAPY  (DCIS LEFT BREAST)    COLONOSCOPY      GASTRECTOMY SLEEVE LAPAROSCOPIC      LIPOSUCTION  12/2023    FL EXCISION SKIN ABD INFRAUMBILICAL PANNICULECTOMY N/A 04/06/2022    Procedure: LIPECTOMY;  Surgeon: Blade Whitfield MD;  Location:  MAIN OR;  Service: Plastics    FL MASTOPEXY Bilateral 10/06/2022    Procedure: MASTOPEXY BREAST;  Surgeon: Blade Whitfield MD;  Location:  MAIN OR;  Service: Plastics    REDUCTION MAMMAPLASTY      TUBAL LIGATION      WRIST SURGERY Right        Family History   Problem Relation Age of Onset    Hyperlipidemia Mother     Diabetes Mother     Hypertension Mother     Colon cancer Father     Liver cancer Father     Hypertension Sister     No Known Problems Sister     No Known Problems Sister     Hypertension Brother     No Known Problems Maternal Grandmother     No Known Problems Maternal Grandfather     Skin cancer Paternal Grandmother     Lymphoma Paternal Grandfather     No Known Problems Maternal Aunt     No Known Problems Maternal Aunt     No Known Problems Maternal Aunt     No Known Problems Maternal Aunt     No Known Problems Paternal Aunt     No Known Problems Paternal Aunt     No Known Problems Paternal Aunt     No Known Problems Paternal Aunt     No Known Problems Daughter     No Known Problems Daughter     No Known Problems Daughter     Breast cancer Neg Hx          Medications have been verified.        Objective     /87   Pulse 67   Temp (!) 97 °F (36.1 °C)   Resp 16   Ht 5' 3\" (1.6 m)   Wt 90.7 kg (200 lb)   LMP 12/15/2018 (Approximate) Comment: Hx of tubal ligation  SpO2 97%   BMI 35.43 kg/m²   Patient's last menstrual period was 12/15/2018 (approximate).         Physical Exam     Physical Exam  Vitals and nursing note reviewed.   Constitutional:       General: She is awake. She is not in acute distress.     Appearance: Normal appearance. She is not ill-appearing, toxic-appearing or diaphoretic.   Cardiovascular:      Rate and Rhythm: " Normal rate.      Pulses: Normal pulses.      Heart sounds: Normal heart sounds, S1 normal and S2 normal.   Pulmonary:      Effort: Pulmonary effort is normal.      Breath sounds: Normal breath sounds and air entry.   Abdominal:      General: There is distension.       Skin:     General: Skin is warm.      Capillary Refill: Capillary refill takes less than 2 seconds.   Neurological:      Mental Status: She is alert.   Psychiatric:         Mood and Affect: Mood normal.         Behavior: Behavior normal.         Thought Content: Thought content normal.         Judgment: Judgment normal.

## 2024-09-26 ENCOUNTER — HOSPITAL ENCOUNTER (OUTPATIENT)
Dept: CT IMAGING | Facility: HOSPITAL | Age: 54
End: 2024-09-26
Attending: FAMILY MEDICINE
Payer: COMMERCIAL

## 2024-09-26 DIAGNOSIS — I71.21 ANEURYSM OF ASCENDING AORTA WITHOUT RUPTURE (HCC): ICD-10-CM

## 2024-09-26 PROCEDURE — 71250 CT THORAX DX C-: CPT

## 2024-10-04 ENCOUNTER — TELEPHONE (OUTPATIENT)
Dept: BARIATRICS | Facility: CLINIC | Age: 54
End: 2024-10-04

## 2024-10-04 NOTE — TELEPHONE ENCOUNTER
Left vm for pt to reschedule follow up apt due to no show   Patient is calling to reschedule his appointment today as his transportation did not pick him up. Please call back to reschedule.

## 2024-10-11 ENCOUNTER — OFFICE VISIT (OUTPATIENT)
Dept: BARIATRICS | Facility: CLINIC | Age: 54
End: 2024-10-11
Payer: COMMERCIAL

## 2024-10-11 VITALS
HEART RATE: 82 BPM | WEIGHT: 191.4 LBS | TEMPERATURE: 98.6 F | BODY MASS INDEX: 33.91 KG/M2 | DIASTOLIC BLOOD PRESSURE: 80 MMHG | SYSTOLIC BLOOD PRESSURE: 122 MMHG | RESPIRATION RATE: 16 BRPM | HEIGHT: 63 IN

## 2024-10-11 DIAGNOSIS — K76.0 HEPATIC STEATOSIS: ICD-10-CM

## 2024-10-11 DIAGNOSIS — E66.811 OBESITY, CLASS I, BMI 30-34.9: Primary | ICD-10-CM

## 2024-10-11 DIAGNOSIS — I10 ESSENTIAL HYPERTENSION: ICD-10-CM

## 2024-10-11 DIAGNOSIS — R73.03 PREDIABETES: ICD-10-CM

## 2024-10-11 PROCEDURE — 99214 OFFICE O/P EST MOD 30 MIN: CPT | Performed by: PHYSICIAN ASSISTANT

## 2024-10-11 RX ORDER — TIRZEPATIDE 2.5 MG/.5ML
2.5 INJECTION, SOLUTION SUBCUTANEOUS WEEKLY
Qty: 2 ML | Refills: 0 | Status: SHIPPED | OUTPATIENT
Start: 2024-10-11 | End: 2024-11-08

## 2024-10-11 NOTE — PROGRESS NOTES
Assessment/Plan:    Obesity, Class I, BMI 30-34.9  -Patient is ipursuing coservative plan  -Calorie goals (1000), sample menu, portion size guidelines, and food logging reviewed with the patient.   -S/P sleeve gastrectomy approximately May 2019 w/dr chandler  -Initial weight loss goal of 5-10% weight loss for improved health  -Screening labs and records reviewed from prior    Goals:  -Food log (ie.) [email or web address]  - To drink at least 64oz of water daily.No sugary beverages.  -discussed planning out an afternoon snack and snack lists provided    She was recommended to start on wegovy but could not due to supply. To start on zepbound. They have tried more than 6 months of lifestyle modifications including diet and activity changes and has had insignificant weight loss of less than 1 lb a week. Patient denies personal and family history of MCT and MEN2 tumors. Patient denies personal history of pancreatitis. Side effects discussed but not limited to diarrhea, bloating, constipation, GI upset, heartburn, increased heart rate, headache, low blood sugar, fatigue and dizziness. Titration and medication administration discussed. Contraception: tubal    Prior on phentermine(SE headaches), wellbutrin and metformin.  Cannot take topamax due to kidney stones    Initial Weight:185lb  Current Weight:191  Change:+6lb  Goal Weight:150lb        Hepatic steatosis  -should improve with weight loss, dietary, and lifestyle changes      Essential hypertension  On metoprolol and norvasc  -should improve with weight loss, dietary, and lifestyle changes      Prediabetes  Was on metformin prior  -should improve with weight loss, dietary, and lifestyle changes        Return in about 6 months (around 4/11/2025) for 3 month nurse visit.  .       Diagnoses and all orders for this visit:    Obesity, Class I, BMI 30-34.9  -     tirzepatide (Zepbound) 2.5 mg/0.5 mL auto-injector; Inject 0.5 mL (2.5 mg total)  under the skin once a week for 28 days    Hepatic steatosis  -     tirzepatide (Zepbound) 2.5 mg/0.5 mL auto-injector; Inject 0.5 mL (2.5 mg total) under the skin once a week for 28 days    Essential hypertension  -     tirzepatide (Zepbound) 2.5 mg/0.5 mL auto-injector; Inject 0.5 mL (2.5 mg total) under the skin once a week for 28 days    Prediabetes  -     tirzepatide (Zepbound) 2.5 mg/0.5 mL auto-injector; Inject 0.5 mL (2.5 mg total) under the skin once a week for 28 days          Subjective:   Chief Complaint   Patient presents with    Follow-up     MWM- 4 Mo F/u; Waist-40.5in        Patient ID: Florencia Stewart  is a 54 y.o. female with excess weight/obesity here to pursue weight managment.  Patient is pursuing Conservative Program.     HPI  When last seen was prescribed wegovy in February. She was not able to start it. She is interested in medication optionss.      Has stopped much bread and soda    She notes increased snoring. Used to have sleep apnea prior to sleeve gastrectomy  Wt Readings from Last 10 Encounters:   10/11/24 86.8 kg (191 lb 6.4 oz)   09/24/24 90.7 kg (200 lb)   09/16/24 90.8 kg (200 lb 3.2 oz)   09/05/24 88 kg (194 lb)   02/05/24 83.5 kg (184 lb)   01/31/24 82.1 kg (181 lb)   12/05/23 84.7 kg (186 lb 12.8 oz)   11/16/23 83.9 kg (185 lb)   10/19/23 84.7 kg (186 lb 12.8 oz)   08/16/23 86.6 kg (191 lb)       Food logging:  Increased appetite/cravings:  Exercise:none right now  Works as a  of medicine  Hydration:64 oz water, stopeed soda    Diet Recall:  930 AM: oatmeal  1PM: soup or rice, protein , vegetable  5PM: similar to lunch  S: sometimes     The following portions of the patient's history were reviewed and updated as appropriate: She  has a past medical history of Anemia, Anesthesia, Aortic aneurysm (HCC), Breast cancer (HCC) (01/20/2017), Depression, Diabetes mellitus (HCC), GERD (gastroesophageal reflux disease), History of chemotherapy (2017), History of COVID-19,  Hyperlipidemia, Hypertension, Left wrist pain (07/22/2019), Migraine, Obesity, Prediabetes, Sleep apnea, and Tendinitis.  She   Patient Active Problem List    Diagnosis Date Noted    Prediabetes 08/16/2023    History of bilateral breast reduction surgery 06/06/2023    Right ureteral stone 05/25/2023    Helicobacter pylori gastritis 05/23/2023    Deviated nasal septum 02/08/2023    Hepatic steatosis 02/08/2023    Dyspepsia 01/11/2023    Allergic rhinitis 01/11/2023    Ptosis of breast 10/06/2022    Vitamin D deficiency 05/06/2022    Lipodystrophy 04/07/2022    Mixed hyperlipidemia 10/18/2021    Benign paroxysmal positional vertigo due to bilateral vestibular disorder 07/28/2021    Annual physical exam 10/09/2019    Ascending aortic aneurysm (HCC) 06/06/2019    Chronic midline low back pain without sciatica 01/26/2019    History of diabetes mellitus 01/25/2019    Status post laparoscopic sleeve gastrectomy 01/25/2019    Ductal carcinoma in situ (DCIS) of breast 08/03/2017    Essential hypertension 08/03/2017    Obesity, Class I, BMI 30-34.9 08/03/2017    Uterine fibroid 08/03/2017     She  has a past surgical history that includes Tubal ligation; GASTRECTOMY SLEEVE LAPAROSCOPIC; Breast biopsy (Left, 12/06/2016); Breast lumpectomy w/ needle localization (Left, 01/20/2017); Colonoscopy; pr excision skin abd infraumbilical panniculectomy (N/A, 04/06/2022); ABDOMINOPLASTY (N/A, 04/06/2022); pr mastopexy (Bilateral, 10/06/2022); Wrist surgery (Right); Reduction mammaplasty; and Liposuction (12/2023).  Her family history includes Colon cancer in her father; Diabetes in her mother; Hyperlipidemia in her mother; Hypertension in her brother, mother, and sister; Liver cancer in her father; Lymphoma in her paternal grandfather; No Known Problems in her daughter, daughter, daughter, maternal aunt, maternal aunt, maternal aunt, maternal aunt, maternal grandfather, maternal grandmother, paternal aunt, paternal aunt, paternal aunt,  paternal aunt, sister, and sister; Skin cancer in her paternal grandmother.  She  reports that she has never smoked. She has never been exposed to tobacco smoke. She has never used smokeless tobacco. She reports that she does not currently use alcohol. She reports that she does not use drugs.  Current Outpatient Medications   Medication Sig Dispense Refill    amLODIPine (NORVASC) 10 mg tablet Take 1 tablet (10 mg total) by mouth daily 90 tablet 3    atorvastatin (LIPITOR) 80 mg tablet Take 1 tablet (80 mg total) by mouth daily 90 tablet 2    metoprolol tartrate (LOPRESSOR) 25 mg tablet Take 0.5 tablets (12.5 mg total) by mouth every 12 (twelve) hours 90 tablet 1    tirzepatide (Zepbound) 2.5 mg/0.5 mL auto-injector Inject 0.5 mL (2.5 mg total) under the skin once a week for 28 days 2 mL 0     Current Facility-Administered Medications   Medication Dose Route Frequency Provider Last Rate Last Admin    Inclisiran Sodium (LEQVIO) subcutaneous injection 284 mg  284 mg Subcutaneous Once Dominguez Bucio, DO         Current Outpatient Medications on File Prior to Visit   Medication Sig    amLODIPine (NORVASC) 10 mg tablet Take 1 tablet (10 mg total) by mouth daily    atorvastatin (LIPITOR) 80 mg tablet Take 1 tablet (80 mg total) by mouth daily    metoprolol tartrate (LOPRESSOR) 25 mg tablet Take 0.5 tablets (12.5 mg total) by mouth every 12 (twelve) hours     Current Facility-Administered Medications on File Prior to Visit   Medication    Inclisiran Sodium (LEQVIO) subcutaneous injection 284 mg     She has No Known Allergies..    Review of Systems   Constitutional:  Negative for fever.   Respiratory:  Negative for shortness of breath.    Cardiovascular:  Negative for chest pain and palpitations.   Gastrointestinal:  Negative for abdominal pain, constipation, diarrhea and vomiting.        Bloating   Genitourinary:  Negative for difficulty urinating.   Musculoskeletal:  Negative for arthralgias and back pain.   Skin:   "Negative for rash.   Neurological:  Negative for headaches.   Psychiatric/Behavioral:  Negative for dysphoric mood. The patient is not nervous/anxious.        Objective:    /80 (BP Location: Left arm, Patient Position: Sitting)   Pulse 82   Temp 98.6 °F (37 °C) (Tympanic)   Resp 16   Ht 5' 3\" (1.6 m)   Wt 86.8 kg (191 lb 6.4 oz)   LMP 12/15/2018 (Approximate) Comment: Hx of tubal ligation  BMI 33.90 kg/m²      Physical Exam  Vitals and nursing note reviewed.   Constitutional:       General: She is not in acute distress.     Appearance: She is well-developed. She is obese.   HENT:      Head: Normocephalic and atraumatic.   Eyes:      Conjunctiva/sclera: Conjunctivae normal.   Neck:      Thyroid: No thyromegaly.   Pulmonary:      Effort: Pulmonary effort is normal. No respiratory distress.   Skin:     Findings: No rash (visible).   Neurological:      Mental Status: She is alert and oriented to person, place, and time.   Psychiatric:         Behavior: Behavior normal.        "

## 2024-10-11 NOTE — ASSESSMENT & PLAN NOTE
-Patient is ipursuing coservative plan  -Calorie goals (1000), sample menu, portion size guidelines, and food logging reviewed with the patient.   -S/P sleeve gastrectomy approximately May 2019 w/dr chandler  -Initial weight loss goal of 5-10% weight loss for improved health  -Screening labs and records reviewed from prior    Goals:  -Food log (ie.) [email or web address]  - To drink at least 64oz of water daily.No sugary beverages.  -discussed planning out an afternoon snack and snack lists provided    She was recommended to start on wegovy but could not due to supply. To start on zepbound. They have tried more than 6 months of lifestyle modifications including diet and activity changes and has had insignificant weight loss of less than 1 lb a week. Patient denies personal and family history of MCT and MEN2 tumors. Patient denies personal history of pancreatitis. Side effects discussed but not limited to diarrhea, bloating, constipation, GI upset, heartburn, increased heart rate, headache, low blood sugar, fatigue and dizziness. Titration and medication administration discussed. Contraception: tubal    Prior on phentermine(SE headaches), wellbutrin and metformin.  Cannot take topamax due to kidney stones    Initial Weight:185lb  Current Weight:191  Change:+6lb  Goal Weight:150lb

## 2024-10-14 ENCOUNTER — TELEPHONE (OUTPATIENT)
Dept: BARIATRICS | Facility: CLINIC | Age: 54
End: 2024-10-14

## 2024-10-14 NOTE — TELEPHONE ENCOUNTER
PA for Zepbound 2.5 mg SUBMITTED     via    [x]CMM-KEY: [id number]  []Surescripts-Case ID #    []Availity-Auth ID #  NDC #    []Faxed to plan   []Other website    []Phone call Case ID #      Office notes sent, clinical questions answered. Awaiting determination    Turnaround time for your insurance to make a decision on your Prior Authorization can take 7-21 business days.

## 2024-10-14 NOTE — TELEPHONE ENCOUNTER
PA for Zepbound 2.5 mg  APPROVED-ALL STRENGTHS     Date(s) approved 10/14/2024-4/14/2025    Case #    Patient advised by          []MyChart Message  []Phone call   [x]LMOM  []L/M to call office as no active Communication consent on file  []Unable to leave detailed message as VM not approved on Communication consent       Pharmacy advised by    [x]Fax  []Phone call    Approval letter scanned into Media Yes

## 2024-10-16 DIAGNOSIS — E55.9 VITAMIN D DEFICIENCY: Primary | ICD-10-CM

## 2024-10-16 RX ORDER — ERGOCALCIFEROL 1.25 MG/1
50000 CAPSULE, LIQUID FILLED ORAL WEEKLY
Qty: 12 CAPSULE | Refills: 0 | Status: SHIPPED | OUTPATIENT
Start: 2024-10-16

## 2024-10-26 ENCOUNTER — HOSPITAL ENCOUNTER (OUTPATIENT)
Dept: ULTRASOUND IMAGING | Facility: HOSPITAL | Age: 54
Discharge: HOME | End: 2024-10-26
Attending: INTERNAL MEDICINE
Payer: COMMERCIAL

## 2024-10-26 DIAGNOSIS — Z00.01 ABNORMAL PHYSICAL EVALUATION: ICD-10-CM

## 2024-10-26 PROCEDURE — 76705 ECHO EXAM OF ABDOMEN: CPT

## 2024-11-04 DIAGNOSIS — E66.811 OBESITY, CLASS I, BMI 30-34.9: ICD-10-CM

## 2024-11-04 DIAGNOSIS — R73.03 PREDIABETES: ICD-10-CM

## 2024-11-04 DIAGNOSIS — K76.0 HEPATIC STEATOSIS: ICD-10-CM

## 2024-11-04 DIAGNOSIS — I10 ESSENTIAL HYPERTENSION: ICD-10-CM

## 2024-11-04 NOTE — TELEPHONE ENCOUNTER
Patient would like to go up in dose but would like to discuss it with the doctor first. Please advise

## 2024-11-08 DIAGNOSIS — E66.811 OBESITY, CLASS I, BMI 30-34.9: Primary | ICD-10-CM

## 2024-11-08 RX ORDER — TIRZEPATIDE 2.5 MG/.5ML
2.5 INJECTION, SOLUTION SUBCUTANEOUS WEEKLY
Qty: 2 ML | Refills: 0 | OUTPATIENT
Start: 2024-11-08 | End: 2024-12-06

## 2024-11-08 RX ORDER — TIRZEPATIDE 5 MG/.5ML
5 INJECTION, SOLUTION SUBCUTANEOUS WEEKLY
Qty: 2 ML | Refills: 1 | Status: SHIPPED | OUTPATIENT
Start: 2024-11-08 | End: 2024-11-11 | Stop reason: SDUPTHER

## 2024-11-11 DIAGNOSIS — E66.811 OBESITY, CLASS I, BMI 30-34.9: ICD-10-CM

## 2024-11-12 ENCOUNTER — TELEPHONE (OUTPATIENT)
Dept: GASTROENTEROLOGY | Facility: MEDICAL CENTER | Age: 54
End: 2024-11-12

## 2024-11-12 RX ORDER — TIRZEPATIDE 5 MG/.5ML
5 INJECTION, SOLUTION SUBCUTANEOUS WEEKLY
Qty: 2 ML | Refills: 1 | Status: SHIPPED | OUTPATIENT
Start: 2024-11-12 | End: 2025-01-07

## 2024-11-19 ENCOUNTER — OFFICE VISIT (OUTPATIENT)
Dept: GASTROENTEROLOGY | Facility: CLINIC | Age: 54
End: 2024-11-19
Payer: COMMERCIAL

## 2024-11-19 DIAGNOSIS — R14.0 BLOATING: Primary | ICD-10-CM

## 2024-11-19 DIAGNOSIS — R19.4 CHANGE IN BOWEL HABITS: ICD-10-CM

## 2024-11-19 PROCEDURE — PBNCHG PB NO CHARGE PLACEHOLDER: Performed by: INTERNAL MEDICINE

## 2024-11-19 PROCEDURE — 91065 BREATH HYDROGEN/METHANE TEST: CPT | Performed by: INTERNAL MEDICINE

## 2024-11-19 NOTE — PROGRESS NOTES
[facility]       Bacterial Overgrowth Analytical Record    Florencia Stewart 54 y.o. female MRN: [id number]      Date of Test: 11/12/24    Substrate Given: Lactulose    Ordering Provider: Mariusz Hernandez MD    Medical Assistant: Elda Angel    Symptoms: bloating, Change in Bowel Habits    The patient presents for bacterial overgrowth testing.    Patient fasted overnight. Baseline readings obtained.   Breath test performed every 20 min for a total of 3 hr    Sample Clock Time ppmH2 ppmCH4 Co2% Jose   Baseline   7:15   9   11   3.5   1.57   #1  20 minutes   7:35   9   9   3.5   1.74   #2  40 minutes   7:55   9   11   3.1   1.77   #3  60 minutes   8:15   5   7   3.1   1.77   #4  80 minutes   8:35   3   7   3.2   1.71   #5  100 minutes   8:55   5   8   3.3   1.66   #6  120 minutes   9:15   6     8   3.6   1.52   #7  160 minutes   9:35   9   10   3.2   1.71   #8  160 minutes   9:55   7   9   3.7   1.48   #9  180 minutes   10:15   20   11   4.3   1.27       Physician interpretation: Study negative for SIBO

## 2024-11-27 ENCOUNTER — TELEPHONE (OUTPATIENT)
Dept: GASTROENTEROLOGY | Facility: CLINIC | Age: 54
End: 2024-11-27

## 2024-11-27 NOTE — TELEPHONE ENCOUNTER
I called pt lmom advising of negative ( good news) results asked pt to return our call with any questions

## 2024-11-27 NOTE — TELEPHONE ENCOUNTER
----- Message from aJmil Leiva MD sent at 11/27/2024  9:15 AM EST -----  Great news study negative for SIBO.  ----- Message -----  From: Elda Angel MA  Sent: 11/19/2024   1:33 PM EST  To: Jamil Leiva MD    CC:  Mariusz Hernandez MD

## 2024-12-05 ENCOUNTER — OFFICE VISIT (OUTPATIENT)
Dept: FAMILY MEDICINE CLINIC | Facility: CLINIC | Age: 54
End: 2024-12-05

## 2024-12-05 VITALS
HEART RATE: 86 BPM | TEMPERATURE: 97.6 F | BODY MASS INDEX: 33.54 KG/M2 | OXYGEN SATURATION: 98 % | DIASTOLIC BLOOD PRESSURE: 84 MMHG | SYSTOLIC BLOOD PRESSURE: 116 MMHG | HEIGHT: 63 IN | RESPIRATION RATE: 18 BRPM | WEIGHT: 189.3 LBS

## 2024-12-05 DIAGNOSIS — Z98.84 STATUS POST LAPAROSCOPIC SLEEVE GASTRECTOMY: ICD-10-CM

## 2024-12-05 DIAGNOSIS — K76.0 HEPATIC STEATOSIS: ICD-10-CM

## 2024-12-05 DIAGNOSIS — I10 ESSENTIAL HYPERTENSION: Primary | ICD-10-CM

## 2024-12-05 DIAGNOSIS — I71.21 ANEURYSM OF ASCENDING AORTA WITHOUT RUPTURE (HCC): ICD-10-CM

## 2024-12-05 DIAGNOSIS — E78.2 MIXED HYPERLIPIDEMIA: ICD-10-CM

## 2024-12-05 DIAGNOSIS — R73.03 PREDIABETES: ICD-10-CM

## 2024-12-05 DIAGNOSIS — R10.13 DYSPEPSIA: ICD-10-CM

## 2024-12-05 DIAGNOSIS — Z23 ENCOUNTER FOR IMMUNIZATION: ICD-10-CM

## 2024-12-05 PROCEDURE — 90471 IMMUNIZATION ADMIN: CPT

## 2024-12-05 PROCEDURE — 3074F SYST BP LT 130 MM HG: CPT | Performed by: FAMILY MEDICINE

## 2024-12-05 PROCEDURE — 99214 OFFICE O/P EST MOD 30 MIN: CPT | Performed by: FAMILY MEDICINE

## 2024-12-05 PROCEDURE — 90750 HZV VACC RECOMBINANT IM: CPT

## 2024-12-05 PROCEDURE — 3079F DIAST BP 80-89 MM HG: CPT | Performed by: FAMILY MEDICINE

## 2024-12-05 NOTE — ASSESSMENT & PLAN NOTE
Most recent hemoglobin A1c of 6.3  Recommend strict low carbohydrate diet  She is on Zepbound which is helping with weight loss  Recheck hemoglobin A1c in a few months  Orders:    Hemoglobin A1C; Future

## 2024-12-05 NOTE — PROGRESS NOTES
Name: Florencia Stewart      : 1970      MRN: [id number]  Encounter Provider: Rick Hua MD  Encounter Date: 2024   Encounter department: [facility]  :  Assessment & Plan  Essential hypertension  Well-controlled  Continue amlodipine and metoprolol  Recommend DASH diet       Aneurysm of ascending aorta without rupture (HCC)  Asymptomatic  Reviewed recent CT scan which showed stable 4.3 cm aortic aneurysm aneyrsm  Encourage patient to work on aggressive control of her cholesterol         Dyspepsia  1 year onset of chronic persistent epigastric abdominal pain/bloating  shortly after eating  Symptoms often associated with nausea  History of bariatric surgery and currently on Zepbound.  She had symptoms prior to initiation of Zepbound  Will check gallbladder ultrasound to rule out gallbladder stone    Orders:    US right upper quadrant; Future    Prediabetes  Most recent hemoglobin A1c of 6.3  Recommend strict low carbohydrate diet  She is on Zepbound which is helping with weight loss  Recheck hemoglobin A1c in a few months  Orders:    Hemoglobin A1C; Future    Mixed hyperlipidemia  Reviewed lipid panel with patient with total cholesterol 276, triglycerides 217, and LDL of 187  Patient had been noncompliant with her cholesterol medications until recently  Counseled on importance of taking her cholesterol medications regularly  Also recommend strict low-fat and low-cholesterol diet    Orders:    Lipid Panel with Direct LDL reflex; Future    Encounter for immunization    Orders:    Zoster Vaccine Recombinant IM    Status post laparoscopic sleeve gastrectomy  Continue follow-up with weight management  Continue using multivitamins       Hepatic steatosis  Continue Zepbound for weight loss              History of Present Illness     54-year-old female with a history of hypertension, ascending aortic aneurysm, hyperlipidemia, and obesity who presents today  "for follow-up.  She is doing okay overall.  She continues to experience abdominal pain and bloating shortly after she eats.  Symptoms are associated with nausea shortly after she eats.  She has tried eliminating some food but cannot identify any particular food triggers.  She is following with GI outpatient. She was recently started on Zepbound.  She has lost a decent amount of weight recently.  She had abdominal issues prior to initiating Zepbound.      Review of Systems   Constitutional:  Negative for appetite change, chills, diaphoresis, fatigue and fever.   Eyes:  Negative for visual disturbance.   Respiratory:  Negative for shortness of breath and wheezing.    Cardiovascular:  Negative for chest pain and palpitations.   Gastrointestinal:  Positive for abdominal pain and nausea. Negative for abdominal distention, blood in stool, rectal pain and vomiting.   Genitourinary:  Negative for dysuria, hematuria and urgency.   Musculoskeletal:  Negative for back pain.   Skin:  Negative for rash.   Neurological:  Negative for dizziness, seizures, syncope, light-headedness and headaches.   Psychiatric/Behavioral:  Negative for dysphoric mood.           Objective   /84 (BP Location: Right arm, Patient Position: Sitting, Cuff Size: Standard)   Pulse 86   Temp 97.6 °F (36.4 °C) (Temporal)   Resp 18   Ht 5' 3\" (1.6 m)   Wt 85.9 kg (189 lb 4.8 oz)   LMP 12/15/2018 (Approximate) Comment: Hx of tubal ligation  SpO2 98%   BMI 33.53 kg/m²      Physical Exam  Constitutional:       General: She is not in acute distress.     Appearance: Normal appearance. She is well-developed. She is not ill-appearing, toxic-appearing or diaphoretic.   HENT:      Head: Normocephalic and atraumatic.      Right Ear: External ear normal.      Left Ear: External ear normal.      Nose: Nose normal.      Mouth/Throat:      Pharynx: No oropharyngeal exudate.   Eyes:      General: No scleral icterus.        Right eye: No discharge.         Left " eye: No discharge.      Extraocular Movements: Extraocular movements intact.      Pupils: Pupils are equal, round, and reactive to light.   Cardiovascular:      Rate and Rhythm: Normal rate and regular rhythm.      Heart sounds: Normal heart sounds. No murmur heard.     No friction rub. No gallop.   Pulmonary:      Effort: Pulmonary effort is normal. No respiratory distress.      Breath sounds: Normal breath sounds. No stridor. No wheezing or rhonchi.   Abdominal:      General: Bowel sounds are normal. There is no distension.      Palpations: Abdomen is soft. There is no mass.      Tenderness: There is no abdominal tenderness. There is no guarding.   Musculoskeletal:         General: Normal range of motion.      Cervical back: Normal range of motion.      Right lower leg: No edema.      Left lower leg: No edema.   Lymphadenopathy:      Cervical: No cervical adenopathy.   Skin:     General: Skin is warm.      Capillary Refill: Capillary refill takes less than 2 seconds.   Neurological:      General: No focal deficit present.      Mental Status: She is alert and oriented to person, place, and time.      Cranial Nerves: No cranial nerve deficit.      Motor: No weakness.      Gait: Gait normal.   Psychiatric:         Mood and Affect: Mood normal.

## 2024-12-05 NOTE — ASSESSMENT & PLAN NOTE
Reviewed lipid panel with patient with total cholesterol 276, triglycerides 217, and LDL of 187  Patient had been noncompliant with her cholesterol medications until recently  Counseled on importance of taking her cholesterol medications regularly  Also recommend strict low-fat and low-cholesterol diet    Orders:    Lipid Panel with Direct LDL reflex; Future

## 2024-12-05 NOTE — ASSESSMENT & PLAN NOTE
Asymptomatic  Reviewed recent CT scan which showed stable 4.3 cm aortic aneurysm aneyrsm  Encourage patient to work on aggressive control of her cholesterol

## 2024-12-05 NOTE — ASSESSMENT & PLAN NOTE
MRI brain wo contrast reviewed - no acute findings, nonspecific WM disease    MRA head/neck reviewed - no significant stenosis or LVO. 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  -- can monitor outpatient.     Ok to discharge from neurology standpoint. Will notify Dr. Greco.    Well-controlled  Continue amlodipine and metoprolol  Recommend DASH diet        Patient will improve strength in B LE by 1 grade in 6-8 weeks to improve overall functional mobility including gait, transfers, bed mobility and decrease risk of falls.

## 2024-12-05 NOTE — ASSESSMENT & PLAN NOTE
1 year onset of chronic persistent epigastric abdominal pain/bloating  shortly after eating  Symptoms often associated with nausea  History of bariatric surgery and currently on Zepbound.  She had symptoms prior to initiation of Zepbound  Will check gallbladder ultrasound to rule out gallbladder stone    Orders:    US right upper quadrant; Future

## 2024-12-10 DIAGNOSIS — E66.811 OBESITY, CLASS I, BMI 30-34.9: ICD-10-CM

## 2024-12-10 RX ORDER — TIRZEPATIDE 5 MG/.5ML
INJECTION, SOLUTION SUBCUTANEOUS
Qty: 2 ML | Refills: 1 | Status: SHIPPED | OUTPATIENT
Start: 2024-12-10

## 2024-12-23 ENCOUNTER — TELEPHONE (OUTPATIENT)
Dept: GASTROENTEROLOGY | Facility: MEDICAL CENTER | Age: 54
End: 2024-12-23

## 2024-12-23 NOTE — TELEPHONE ENCOUNTER
Spoke to pt informing pt that Dr. Hernandez is unavailable on 2/4/25. Let pt know 's next available is not until September and pt stated that is too long and is starting to have stomach issues and would like an appt sooner. Pt stated she would like to schedule her appt with any provider, pt was scheduled with  for March 3 at 11:20am and placed on cancellation list for all providers per her request.

## 2025-01-02 ENCOUNTER — HOSPITAL ENCOUNTER (OUTPATIENT)
Dept: MAMMOGRAPHY | Facility: MEDICAL CENTER | Age: 55
Discharge: HOME | End: 2025-01-02
Payer: COMMERCIAL

## 2025-01-02 VITALS — HEIGHT: 63 IN | WEIGHT: 189 LBS | BODY MASS INDEX: 33.49 KG/M2

## 2025-01-02 DIAGNOSIS — Z12.31 ENCOUNTER FOR SCREENING MAMMOGRAM FOR BREAST CANCER: ICD-10-CM

## 2025-01-02 PROCEDURE — 77067 SCR MAMMO BI INCL CAD: CPT

## 2025-01-02 PROCEDURE — 77063 BREAST TOMOSYNTHESIS BI: CPT

## 2025-01-13 ENCOUNTER — CLINICAL SUPPORT (OUTPATIENT)
Dept: BARIATRICS | Facility: CLINIC | Age: 55
End: 2025-01-13

## 2025-01-13 ENCOUNTER — HOSPITAL ENCOUNTER (OUTPATIENT)
Dept: MAMMOGRAPHY | Facility: MEDICAL CENTER | Age: 55
Discharge: HOME | End: 2025-01-13

## 2025-01-13 VITALS — BODY MASS INDEX: 33.48 KG/M2 | HEIGHT: 63 IN | WEIGHT: 188.93 LBS

## 2025-01-13 VITALS
RESPIRATION RATE: 16 BRPM | SYSTOLIC BLOOD PRESSURE: 137 MMHG | TEMPERATURE: 96.4 F | DIASTOLIC BLOOD PRESSURE: 99 MMHG | BODY MASS INDEX: 33.38 KG/M2 | WEIGHT: 188.4 LBS | HEART RATE: 87 BPM | HEIGHT: 63 IN

## 2025-01-13 DIAGNOSIS — R63.5 ABNORMAL WEIGHT GAIN: Primary | ICD-10-CM

## 2025-01-13 DIAGNOSIS — I10 ESSENTIAL HYPERTENSION: Primary | ICD-10-CM

## 2025-01-13 DIAGNOSIS — R92.2 INCONCLUSIVE MAMMOGRAM: ICD-10-CM

## 2025-01-13 DIAGNOSIS — E66.811 OBESITY, CLASS I, BMI 30-34.9: ICD-10-CM

## 2025-01-13 PROCEDURE — RECHECK

## 2025-01-13 RX ORDER — TIRZEPATIDE 7.5 MG/.5ML
7.5 INJECTION, SOLUTION SUBCUTANEOUS WEEKLY
Qty: 2 ML | Refills: 1 | Status: SHIPPED | OUTPATIENT
Start: 2025-01-13 | End: 2025-03-10

## 2025-01-13 NOTE — PROGRESS NOTES
Patient last visit weight:191 lbs  Patient current visit weight:188.4LBS    If you are taking phentermine or other oral weight loss medications, are you experiencing any of the following symptoms:  Headache:   Blurred Vision:   Chest Pain:   Palpitations:  Insomnia:   SPECIFY ORAL MEDICATION AND DOSAGE:     If you are taking an injectable medication,  are you experiencing any of the following symptoms:  Bloating:  No  Nausea: NO  Vomiting: NO  Constipation: NO  Diarrhea: NO  SPECIFY INJECTABLE MEDICATION AND CURRENT DOSAGE:Zepbound 5mg  Pt wants to go up in dose, she stated she not losing to much on the 5mg      Vitals:    Is BP less than 100/60? No  Is BP greater than 140/90? yes  Is HR greater than 100? No  **If yes to any of the above, have patient relax and repeat in 5-10 minutes**    Repeat values:    Is BP less than 100/60? NO  Is BP greater than 140/90?  Yes  Is HR greater than 100? No  **If values remain outside of ranges above, please consult provider for next steps**    Did her vitals twice and she was fine she stated she didn't take her blood pressure meds and will take them that's why her BP was high     Date of last injection:1/13/25    How many injections do you have left:2

## 2025-01-17 ENCOUNTER — APPOINTMENT (EMERGENCY)
Dept: CT IMAGING | Facility: HOSPITAL | Age: 55
End: 2025-01-17
Payer: COMMERCIAL

## 2025-01-17 ENCOUNTER — HOSPITAL ENCOUNTER (EMERGENCY)
Facility: HOSPITAL | Age: 55
Discharge: HOME | End: 2025-01-17
Attending: EMERGENCY MEDICINE
Payer: COMMERCIAL

## 2025-01-17 VITALS
HEART RATE: 96 BPM | OXYGEN SATURATION: 97 % | SYSTOLIC BLOOD PRESSURE: 123 MMHG | DIASTOLIC BLOOD PRESSURE: 76 MMHG | RESPIRATION RATE: 23 BRPM | TEMPERATURE: 96.9 F

## 2025-01-17 DIAGNOSIS — R10.9 ABDOMINAL PAIN: Primary | ICD-10-CM

## 2025-01-17 DIAGNOSIS — Z98.84 STATUS POST LAPAROSCOPIC SLEEVE GASTRECTOMY: ICD-10-CM

## 2025-01-17 DIAGNOSIS — R11.0 NAUSEA: ICD-10-CM

## 2025-01-17 DIAGNOSIS — R11.10 VOMITING: ICD-10-CM

## 2025-01-17 DIAGNOSIS — I10 ESSENTIAL HYPERTENSION: ICD-10-CM

## 2025-01-17 DIAGNOSIS — Z86.39 HISTORY OF DIABETES MELLITUS: ICD-10-CM

## 2025-01-17 LAB
ALBUMIN SERPL BCG-MCNC: 5 G/DL (ref 3.5–5)
ALP SERPL-CCNC: 112 U/L (ref 34–104)
ALT SERPL W P-5'-P-CCNC: 25 U/L (ref 7–52)
ANION GAP SERPL CALCULATED.3IONS-SCNC: 8 MMOL/L (ref 4–13)
AST SERPL W P-5'-P-CCNC: 21 U/L (ref 13–39)
ATRIAL RATE: 106 BPM
ATRIAL RATE: 85 BPM
BASOPHILS # BLD AUTO: 0.05 THOUSANDS/ΜL (ref 0–0.1)
BASOPHILS NFR BLD AUTO: 1 % (ref 0–1)
BILIRUB SERPL-MCNC: 0.62 MG/DL (ref 0.2–1)
BUN SERPL-MCNC: 21 MG/DL (ref 5–25)
CALCIUM SERPL-MCNC: 10.1 MG/DL (ref 8.4–10.2)
CARDIAC TROPONIN I PNL SERPL HS: <2 NG/L (ref ?–50)
CARDIAC TROPONIN I PNL SERPL HS: <2 NG/L (ref ?–50)
CHLORIDE SERPL-SCNC: 105 MMOL/L (ref 96–108)
CO2 SERPL-SCNC: 26 MMOL/L (ref 21–32)
CREAT SERPL-MCNC: 0.95 MG/DL (ref 0.6–1.3)
EOSINOPHIL # BLD AUTO: 0.05 THOUSAND/ΜL (ref 0–0.61)
EOSINOPHIL NFR BLD AUTO: 1 % (ref 0–6)
ERYTHROCYTE [DISTWIDTH] IN BLOOD BY AUTOMATED COUNT: 12.6 % (ref 11.6–15.1)
FLUAV AG UPPER RESP QL IA.RAPID: NEGATIVE
FLUBV AG UPPER RESP QL IA.RAPID: NEGATIVE
GFR SERPL CREATININE-BSD FRML MDRD: 68 ML/MIN/1.73SQ M
GLUCOSE SERPL-MCNC: 108 MG/DL (ref 65–140)
HCT VFR BLD AUTO: 49.6 % (ref 34.8–46.1)
HGB BLD-MCNC: 16.1 G/DL (ref 11.5–15.4)
IMM GRANULOCYTES # BLD AUTO: 0.06 THOUSAND/UL (ref 0–0.2)
IMM GRANULOCYTES NFR BLD AUTO: 1 % (ref 0–2)
LACTATE SERPL-SCNC: 1.3 MMOL/L (ref 0.5–2)
LIPASE SERPL-CCNC: 38 U/L (ref 11–82)
LYMPHOCYTES # BLD AUTO: 1.06 THOUSANDS/ΜL (ref 0.6–4.47)
LYMPHOCYTES NFR BLD AUTO: 10 % (ref 14–44)
MCH RBC QN AUTO: 28.8 PG (ref 26.8–34.3)
MCHC RBC AUTO-ENTMCNC: 32.5 G/DL (ref 31.4–37.4)
MCV RBC AUTO: 89 FL (ref 82–98)
MONOCYTES # BLD AUTO: 0.42 THOUSAND/ΜL (ref 0.17–1.22)
MONOCYTES NFR BLD AUTO: 4 % (ref 4–12)
NEUTROPHILS # BLD AUTO: 8.58 THOUSANDS/ΜL (ref 1.85–7.62)
NEUTS SEG NFR BLD AUTO: 83 % (ref 43–75)
NRBC BLD AUTO-RTO: 0 /100 WBCS
P AXIS: 43 DEGREES
P AXIS: 60 DEGREES
PLATELET # BLD AUTO: 340 THOUSANDS/UL (ref 149–390)
PMV BLD AUTO: 9.8 FL (ref 8.9–12.7)
POTASSIUM SERPL-SCNC: 3.9 MMOL/L (ref 3.5–5.3)
PR INTERVAL: 148 MS
PR INTERVAL: 160 MS
PROT SERPL-MCNC: 8.6 G/DL (ref 6.4–8.4)
QRS AXIS: 34 DEGREES
QRS AXIS: 39 DEGREES
QRSD INTERVAL: 80 MS
QRSD INTERVAL: 84 MS
QT INTERVAL: 302 MS
QT INTERVAL: 356 MS
QTC INTERVAL: 401 MS
QTC INTERVAL: 423 MS
RBC # BLD AUTO: 5.59 MILLION/UL (ref 3.81–5.12)
SARS-COV+SARS-COV-2 AG RESP QL IA.RAPID: NEGATIVE
SODIUM SERPL-SCNC: 139 MMOL/L (ref 135–147)
T WAVE AXIS: 134 DEGREES
T WAVE AXIS: 72 DEGREES
VENTRICULAR RATE: 106 BPM
VENTRICULAR RATE: 85 BPM
WBC # BLD AUTO: 10.22 THOUSAND/UL (ref 4.31–10.16)

## 2025-01-17 PROCEDURE — 84484 ASSAY OF TROPONIN QUANT: CPT

## 2025-01-17 PROCEDURE — 74177 CT ABD & PELVIS W/CONTRAST: CPT

## 2025-01-17 PROCEDURE — 96375 TX/PRO/DX INJ NEW DRUG ADDON: CPT

## 2025-01-17 PROCEDURE — 93010 ELECTROCARDIOGRAM REPORT: CPT | Performed by: INTERNAL MEDICINE

## 2025-01-17 PROCEDURE — 80053 COMPREHEN METABOLIC PANEL: CPT

## 2025-01-17 PROCEDURE — 99285 EMERGENCY DEPT VISIT HI MDM: CPT | Performed by: EMERGENCY MEDICINE

## 2025-01-17 PROCEDURE — 87811 SARS-COV-2 COVID19 W/OPTIC: CPT

## 2025-01-17 PROCEDURE — 87804 INFLUENZA ASSAY W/OPTIC: CPT

## 2025-01-17 PROCEDURE — 93005 ELECTROCARDIOGRAM TRACING: CPT

## 2025-01-17 PROCEDURE — 85025 COMPLETE CBC W/AUTO DIFF WBC: CPT

## 2025-01-17 PROCEDURE — 83605 ASSAY OF LACTIC ACID: CPT

## 2025-01-17 PROCEDURE — 99284 EMERGENCY DEPT VISIT MOD MDM: CPT

## 2025-01-17 PROCEDURE — 96361 HYDRATE IV INFUSION ADD-ON: CPT

## 2025-01-17 PROCEDURE — 36415 COLL VENOUS BLD VENIPUNCTURE: CPT

## 2025-01-17 PROCEDURE — 83690 ASSAY OF LIPASE: CPT

## 2025-01-17 PROCEDURE — 96374 THER/PROPH/DIAG INJ IV PUSH: CPT

## 2025-01-17 RX ORDER — KETOROLAC TROMETHAMINE 30 MG/ML
15 INJECTION, SOLUTION INTRAMUSCULAR; INTRAVENOUS ONCE
Status: COMPLETED | OUTPATIENT
Start: 2025-01-17 | End: 2025-01-17

## 2025-01-17 RX ORDER — ONDANSETRON 2 MG/ML
4 INJECTION INTRAMUSCULAR; INTRAVENOUS ONCE
Status: COMPLETED | OUTPATIENT
Start: 2025-01-17 | End: 2025-01-17

## 2025-01-17 RX ORDER — ACETAMINOPHEN 325 MG/1
975 TABLET ORAL ONCE
Status: COMPLETED | OUTPATIENT
Start: 2025-01-17 | End: 2025-01-17

## 2025-01-17 RX ADMIN — ONDANSETRON 4 MG: 2 INJECTION INTRAMUSCULAR; INTRAVENOUS at 18:32

## 2025-01-17 RX ADMIN — KETOROLAC TROMETHAMINE 15 MG: 30 INJECTION, SOLUTION INTRAMUSCULAR; INTRAVENOUS at 20:44

## 2025-01-17 RX ADMIN — ACETAMINOPHEN 975 MG: 325 TABLET, FILM COATED ORAL at 20:46

## 2025-01-17 RX ADMIN — HYOSCYAMINE SULFATE 0.12 MG: 0.12 TABLET SUBLINGUAL at 18:42

## 2025-01-17 RX ADMIN — IOHEXOL 100 ML: 350 INJECTION, SOLUTION INTRAVENOUS at 19:47

## 2025-01-17 RX ADMIN — SODIUM CHLORIDE 1000 ML: 0.9 INJECTION, SOLUTION INTRAVENOUS at 18:31

## 2025-01-17 NOTE — ED PROVIDER NOTES
Time reflects when diagnosis was documented in both MDM as applicable and the Disposition within this note       Time User Action Codes Description Comment    1/17/2025  9:22 PM Nancy Darin Add [R10.9] Abdominal pain     1/17/2025  9:22 PM Darin Cruz Add [R11.0] Nausea     1/17/2025  9:22 PM Darin Cruz Add [I10] Essential hypertension     1/17/2025  9:22 PM Darin Cruz Add [Z86.39] History of diabetes mellitus     1/17/2025  9:22 PM Darin Cruz Add [Z98.84] Status post laparoscopic sleeve gastrectomy     1/17/2025  9:22 PM Darin Cruz Add [K52.9] Gastroenteritis     1/17/2025  9:23 PM Darin Cruz Remove [K52.9] Gastroenteritis     1/17/2025  9:23 PM Darin Cruz Add [R11.10] Vomiting           ED Disposition       ED Disposition   Discharge    Condition   Stable    Date/Time   Fri Jan 17, 2025  9:23 PM    Comment   Florencia Stewart discharge to home/self care.                   Assessment & Plan       Medical Decision Making  Patient is a 54-year-old female presenting for evaluation of abdominal pain nausea vomiting    Differential: ACS versus gastritis/cholecystitis/pancreatitis.  Doubt obstruction or ruptured AAA.  Could be viral illness such as influenza or viral gastroenteritis    Plan: Abdominal and cardiac labs, EKG, chest x-ray, symptomatic treatment.  Monitor and reassess.  Final dispo pending     Labs unremarkable.  On reassessment patient's nausea resolved however now developing mild generalized headache.  No focal deficits.  Likely primary headache from fluid losses from vomiting.  Will treat symptomatically with Tylenol and Toradol.  CT pending    EKG normal sinus rhythm 95 no ischemic changes no ST elevations or depressions    CT negative for acute pathology.  Patient is hemodynamically stable and cleared for discharge outpatient follow-up.  Return precautions given patient verbalized understanding     Amount and/or Complexity of Data Reviewed  Labs:  "ordered.  Radiology: ordered.    Risk  OTC drugs.  Prescription drug management.             Medications   sodium chloride 0.9 % bolus 1,000 mL (0 mL Intravenous Stopped 1/17/25 2148)   ondansetron (ZOFRAN) injection 4 mg (4 mg Intravenous Given 1/17/25 1832)   hyoscyamine (LEVSIN/SL) SL tablet 0.125 mg (0.125 mg Sublingual Given 1/17/25 1842)   iohexol (OMNIPAQUE) 350 MG/ML injection (MULTI-DOSE) 100 mL (100 mL Intravenous Given 1/17/25 1947)   acetaminophen (TYLENOL) tablet 975 mg (975 mg Oral Given 1/17/25 2046)   ketorolac (TORADOL) injection 15 mg (15 mg Intravenous Given 1/17/25 2044)       ED Risk Strat Scores                          SBIRT 20yo+      Flowsheet Row Most Recent Value   Initial Alcohol Screen: US AUDIT-C     1. How often do you have a drink containing alcohol? 0 Filed at: 01/17/2025 1812   2. How many drinks containing alcohol do you have on a typical day you are drinking?  0 Filed at: 01/17/2025 1812   3a. Male UNDER 65: How often do you have five or more drinks on one occasion? 0 Filed at: 01/17/2025 1812   3b. FEMALE Any Age, or MALE 65+: How often do you have 4 or more drinks on one occassion? 0 Filed at: 01/17/2025 1812   Audit-C Score 0 Filed at: 01/17/2025 1812   ALESSANDRO: How many times in the past year have you...    Used an illegal drug or used a prescription medication for non-medical reasons? Never Filed at: 01/17/2025 1812                            History of Present Illness       Chief Complaint   Patient presents with    Abdominal Pain     Upper abdominal pain, nausea and bloating beginning today.        Past Medical History:   Diagnosis Date    Anemia     4/1/22 Pt reports hx of anemia - no current issues at this time     Anesthesia     4/1/22 Pt reports her mother had reaction to anesthesia - reports her mother \"didnt wake up for two weeks\"     Aortic aneurysm (HCC)     Breast cancer (HCC) 01/20/2017    left side    Depression     4/1/22 Hx of depression - resolved per pt at this " time     Diabetes mellitus (HCC)     GERD (gastroesophageal reflux disease)     History of chemotherapy 2017    chest radiation    History of COVID-19     4/1/22 Pt reports COVID in 2021    Hyperlipidemia     Hypertension     Left wrist pain 07/22/2019    Migraine     Obesity     Post medical management with Phentermine/topiramate     Prediabetes     Sleep apnea     Hx of sleep apnea - using CPAP in the past but since gastric surgery no further issues     Tendinitis     4/1/22 Pt reports right hand tendinitis       Past Surgical History:   Procedure Laterality Date    ABDOMINOPLASTY N/A 04/06/2022    Procedure: ABDOMINOPLASTY;  Surgeon: Blade Whitfield MD;  Location:  MAIN OR;  Service: Plastics    BREAST BIOPSY Left 12/06/2016    stereo    BREAST LUMPECTOMY W/ NEEDLE LOCALIZATION Left 01/20/2017    RADIATION,HORMONE THERAPY  (DCIS LEFT BREAST)    COLONOSCOPY      GASTRECTOMY SLEEVE LAPAROSCOPIC      LIPOSUCTION  12/2023    NC EXC EXCSV SKN ABD INFRAUMBILICAL PANNICULECTOMY N/A 04/06/2022    Procedure: LIPECTOMY;  Surgeon: Blade Whitfield MD;  Location:  MAIN OR;  Service: Plastics    NC MASTOPEXY Bilateral 10/06/2022    Procedure: MASTOPEXY BREAST;  Surgeon: Blade Whitfield MD;  Location:  MAIN OR;  Service: Plastics    REDUCTION MAMMAPLASTY      TUBAL LIGATION      WRIST SURGERY Right       Family History   Problem Relation Age of Onset    Hyperlipidemia Mother     Diabetes Mother     Hypertension Mother     Colon cancer Father     Liver cancer Father     Hypertension Sister     No Known Problems Sister     No Known Problems Sister     Hypertension Brother     No Known Problems Maternal Grandmother     No Known Problems Maternal Grandfather     Skin cancer Paternal Grandmother     Lymphoma Paternal Grandfather     No Known Problems Maternal Aunt     No Known Problems Maternal Aunt     No Known Problems Maternal Aunt     No Known Problems Maternal Aunt     No Known Problems Paternal Aunt     No Known Problems  Paternal Aunt     No Known Problems Paternal Aunt     No Known Problems Paternal Aunt     No Known Problems Daughter     No Known Problems Daughter     No Known Problems Daughter     Breast cancer Neg Hx       Social History     Tobacco Use    Smoking status: Never     Passive exposure: Never    Smokeless tobacco: Never    Tobacco comments:     Denies former or current use   Vaping Use    Vaping status: Never Used   Substance Use Topics    Alcohol use: Not Currently     Comment: occasionally    Drug use: Never     Comment: Denies any former or current use       E-Cigarette/Vaping    E-Cigarette Use Never User     Comments Denies any former or current use        E-Cigarette/Vaping Substances    Nicotine No     THC No     CBD No     Flavoring No     Other No     Unknown No       I have reviewed and agree with the history as documented.     Patient is a 54-year-old female past medical history of diabetes hypertension AAA that presents for evaluation of abdominal pain nausea vomiting x 1 day.  Patient reports that her symptoms began gradually approximately 2:30 PM.  Describes the pain is located in the upper abdomen bilaterally and is a bloating sensation.  Also has associated nausea and multiple episodes of nonbloody nonbilious vomiting.  Denies any chest pain shortness of breath diaphoresis palpitations fever chills.  Last passed gas this morning.  Denies any prior surgeries on her abdomen.  Is denying any other complaints at this time          Review of Systems   Constitutional:  Negative for chills and fever.   HENT:  Negative for ear pain and sore throat.    Eyes:  Negative for pain and visual disturbance.   Respiratory:  Negative for cough and shortness of breath.    Cardiovascular:  Negative for chest pain, palpitations and leg swelling.   Gastrointestinal:  Positive for abdominal pain, nausea and vomiting. Negative for blood in stool, constipation and diarrhea.   Genitourinary:  Negative for dysuria and  hematuria.   Musculoskeletal:  Negative for arthralgias and back pain.   Skin:  Negative for color change and rash.   Neurological:  Negative for seizures and syncope.   All other systems reviewed and are negative.          Objective       ED Triage Vitals [01/17/25 1805]   Temperature Pulse Blood Pressure Respirations SpO2 Patient Position - Orthostatic VS   (!) 96.9 °F (36.1 °C) (!) 111 148/93 18 100 % Sitting      Temp Source Heart Rate Source BP Location FiO2 (%) Pain Score    Temporal Monitor Right arm -- 8      Vitals      Date and Time Temp Pulse SpO2 Resp BP Pain Score FACES Pain Rating User   01/17/25 2130 -- 96 97 % 23 123/76 4 -- [facility]   01/17/25 2100 -- 109 99 % 20 133/69 -- -- [facility]   01/17/25 2046 -- -- -- -- -- 8 -- [facility]   01/17/25 2044 -- -- -- -- -- 8 -- [facility]   01/17/25 2000 -- 102 99 % 20 126/79 -- -- [facility]   01/17/25 1915 -- 96 100 % 20 125/82 -- -- [facility]   01/17/25 1805 96.9 °F (36.1 °C) 111 100 % 18 148/93 8 -- [facility]            Physical Exam  Vitals and nursing note reviewed.   Constitutional:       General: She is not in acute distress.     Appearance: She is well-developed. She is not ill-appearing.   HENT:      Head: Normocephalic and atraumatic.      Mouth/Throat:      Mouth: Mucous membranes are moist.   Eyes:      Extraocular Movements: Extraocular movements intact.      Conjunctiva/sclera: Conjunctivae normal.   Cardiovascular:      Rate and Rhythm: Normal rate and regular rhythm.      Heart sounds: No murmur heard.  Pulmonary:      Effort: Pulmonary effort is normal. No respiratory distress.      Breath sounds: Normal breath sounds.   Abdominal:      Palpations: Abdomen is soft. There is mass (Above umbilicus.  Nonpulsatile). There is no pulsatile mass.      Tenderness: There is abdominal tenderness in the right upper quadrant, epigastric area and left upper quadrant.   Musculoskeletal:         General: Normal range of motion.      Cervical back: Normal range of motion and neck supple.      Right  lower leg: No edema.      Left lower leg: No edema.   Skin:     General: Skin is warm and dry.      Capillary Refill: Capillary refill takes less than 2 seconds.   Neurological:      Mental Status: She is alert.         Results Reviewed       Procedure Component Value Units Date/Time    HS Troponin I 2hr [[id number]] Collected: 01/17/25 2043    Lab Status: Final result Specimen: Blood from Arm, Right Updated: 01/17/25 2116     hs TnI 2hr <2 ng/L      Delta 2hr hsTnI --    FLU/COVID Rapid Antigen (30 min. TAT) - Preferred screening test in ED [[id number]]  (Normal) Collected: 01/17/25 1828    Lab Status: Final result Specimen: Nares from Nose Updated: 01/17/25 1914     SARS COV Rapid Antigen Negative     Influenza A Rapid Antigen Negative     Influenza B Rapid Antigen Negative    Narrative:      This test has been performed using the [employer] Seema 2 FLU+SARS Antigen test under the Emergency Use Authorization (EUA). This test has been validated by the  and verified by the performing laboratory. The Seema uses lateral flow immunofluorescent sandwich assay to detect SARS-COV, Influenza A and Influenza B Antigen.     The Quidel Seema 2 SARS Antigen test does not differentiate between SARS-CoV and SARS-CoV-2.     Negative results are presumptive and may be confirmed with a molecular assay, if necessary, for patient management. Negative results do not rule out SARS-CoV-2 or influenza infection and should not be used as the sole basis for treatment or patient management decisions. A negative test result may occur if the level of antigen in a sample is below the limit of detection of this test.     Positive results are indicative of the presence of viral antigens, but do not rule out bacterial infection or co-infection with other viruses.     All test results should be used as an adjunct to clinical observations and other information available to the provider.    FOR PEDIATRIC PATIENTS - copy/paste COVID Guidelines  URL to browser: [email or web address]    HS Troponin 0hr (reflex protocol) [[id number]]  (Normal) Collected: 01/17/25 1828    Lab Status: Final result Specimen: Blood from Arm, Right Updated: 01/17/25 1909     hs TnI 0hr <2 ng/L     Lactic acid, plasma (w/reflex if result > 2.0) [[id number]]  (Normal) Collected: 01/17/25 1828    Lab Status: Final result Specimen: Blood from Arm, Right Updated: 01/17/25 1905     LACTIC ACID 1.3 mmol/L     Narrative:      Result may be elevated if tourniquet was used during collection.    Comprehensive metabolic panel [[id number]]  (Abnormal) Collected: 01/17/25 1828    Lab Status: Final result Specimen: Blood from Arm, Right Updated: 01/17/25 1902     Sodium 139 mmol/L      Potassium 3.9 mmol/L      Chloride 105 mmol/L      CO2 26 mmol/L      ANION GAP 8 mmol/L      BUN 21 mg/dL      Creatinine 0.95 mg/dL      Glucose 108 mg/dL      Calcium 10.1 mg/dL      AST 21 U/L      ALT 25 U/L      Alkaline Phosphatase 112 U/L      Total Protein 8.6 g/dL      Albumin 5.0 g/dL      Total Bilirubin 0.62 mg/dL      eGFR 68 ml/min/1.73sq m     Narrative:      National Kidney Disease Foundation guidelines for Chronic Kidney Disease (CKD):     Stage 1 with normal or high GFR (GFR > 90 mL/min/1.73 square meters)    Stage 2 Mild CKD (GFR = 60-89 mL/min/1.73 square meters)    Stage 3A Moderate CKD (GFR = 45-59 mL/min/1.73 square meters)    Stage 3B Moderate CKD (GFR = 30-44 mL/min/1.73 square meters)    Stage 4 Severe CKD (GFR = 15-29 mL/min/1.73 square meters)    Stage 5 End Stage CKD (GFR <15 mL/min/1.73 square meters)  Note: GFR calculation is accurate only with a steady state creatinine    Lipase [[id number]]  (Normal) Collected: 01/17/25 1828    Lab Status: Final result Specimen: Blood from Arm, Right Updated: 01/17/25 1902     Lipase 38 u/L     CBC and differential [[id number]]  (Abnormal) Collected: 01/17/25 1828    Lab Status: Final result  Specimen: Blood from Arm, Right Updated: 01/17/25 1842     WBC 10.22 Thousand/uL      RBC 5.59 Million/uL      Hemoglobin 16.1 g/dL      Hematocrit 49.6 %      MCV 89 fL      MCH 28.8 pg      MCHC 32.5 g/dL      RDW 12.6 %      MPV 9.8 fL      Platelets 340 Thousands/uL      nRBC 0 /100 WBCs      Segmented % 83 %      Immature Grans % 1 %      Lymphocytes % 10 %      Monocytes % 4 %      Eosinophils Relative 1 %      Basophils Relative 1 %      Absolute Neutrophils 8.58 Thousands/µL      Absolute Immature Grans 0.06 Thousand/uL      Absolute Lymphocytes 1.06 Thousands/µL      Absolute Monocytes 0.42 Thousand/µL      Eosinophils Absolute 0.05 Thousand/µL      Basophils Absolute 0.05 Thousands/µL             CT abdomen pelvis with contrast   Final Interpretation by Herb Iglesias MD (01/17 2051)      1. Mild diffusely dilated/fluid-filled loops of small bowel and mildly dilated stomach without transition zone or focal area of bowel inflammation. Nonspecific appearance but suggesting diffuse mild ileus/gastroenteritis pattern.   2. Punctate left kidney nonobstructing calculus.   3. Hepatic steatosis            Workstation performed: [id number]             Procedures    ED Medication and Procedure Management   Prior to Admission Medications   Prescriptions Last Dose Informant Patient Reported? Taking?   Zepbound 5 MG/0.5ML auto-injector   No No   Sig: ADMINISTER 5 MG UNDER THE SKIN 1 TIME A WEEK   amLODIPine (NORVASC) 10 mg tablet   No No   Sig: Take 1 tablet (10 mg total) by mouth daily   atorvastatin (LIPITOR) 80 mg tablet   No No   Sig: Take 1 tablet (80 mg total) by mouth daily   ergocalciferol (VITAMIN D2) 50,000 units   No No   Sig: Take 1 capsule (50,000 Units total) by mouth once a week   metoprolol tartrate (LOPRESSOR) 25 mg tablet   No No   Sig: Take 0.5 tablets (12.5 mg total) by mouth every 12 (twelve) hours   tirzepatide (Zepbound) 7.5 mg/0.5 mL auto-injector   No No   Sig: Inject 0.5 mL (7.5 mg total) under  the skin once a week      Facility-Administered Medications Last Administration Doses Remaining   Inclisiran Sodium (LEQVIO) subcutaneous injection 284 mg None recorded 1        Discharge Medication List as of 1/17/2025  9:34 PM        CONTINUE these medications which have NOT CHANGED    Details   amLODIPine (NORVASC) 10 mg tablet Take 1 tablet (10 mg total) by mouth daily, Starting Thu 9/5/2024, Until Wed 12/4/2024, Normal      atorvastatin (LIPITOR) 80 mg tablet Take 1 tablet (80 mg total) by mouth daily, Starting Thu 9/5/2024, Until Wed 12/4/2024, Normal      ergocalciferol (VITAMIN D2) 50,000 units Take 1 capsule (50,000 Units total) by mouth once a week, Starting Wed 10/16/2024, Normal      metoprolol tartrate (LOPRESSOR) 25 mg tablet Take 0.5 tablets (12.5 mg total) by mouth every 12 (twelve) hours, Starting Thu 9/5/2024, Normal      tirzepatide (Zepbound) 7.5 mg/0.5 mL auto-injector Inject 0.5 mL (7.5 mg total) under the skin once a week, Starting Mon 1/13/2025, Until Mon 3/10/2025, Normal      Zepbound 5 MG/0.5ML auto-injector ADMINISTER 5 MG UNDER THE SKIN 1 TIME A WEEK, Normal           No discharge procedures on file.  ED SEPSIS DOCUMENTATION   Time reflects when diagnosis was documented in both MDM as applicable and the Disposition within this note       Time User Action Codes Description Comment    1/17/2025  9:22 PM Darin Cruz [R10.9] Abdominal pain     1/17/2025  9:22 PM Darin Cruz [R11.0] Nausea     1/17/2025  9:22 PM Darin Cruz [I10] Essential hypertension     1/17/2025  9:22 PM Darin Cruz [Z86.39] History of diabetes mellitus     1/17/2025  9:22 PM Darin Cruz [Z98.84] Status post laparoscopic sleeve gastrectomy     1/17/2025  9:22 PM Darin Cruz [K52.9] Gastroenteritis     1/17/2025  9:23 PM Darin Cruz [K52.9] Gastroenteritis     1/17/2025  9:23 PM Darin Cruz Add [R11.10] Vomiting                  Darin Cruz,  DO  01/18/25 2050

## 2025-01-17 NOTE — ED ATTENDING ATTESTATION
1/17/2025  I, Mejia Whipple MD, saw and evaluated the patient. I have discussed the patient with the resident/non-physician practitioner and agree with the resident's/non-physician practitioner's findings, Plan of Care, and MDM as documented in the resident's/non-physician practitioner's note, except where noted. All available labs and Radiology studies were reviewed.  I was present for key portions of any procedure(s) performed by the resident/non-physician practitioner and I was immediately available to provide assistance.       At this point I agree with the current assessment done in the Emergency Department.  I have conducted an independent evaluation of this patient a history and physical is as follows:    55 YO female presents with abdominal pain, bloating and vomiting for the last day. States this is primarily int he upper abdomen, had 4 episodes of NBNB emesis. States she has had similar pain in the past but vomiting is different than previous.States she has been passing flatus. Pt denies CP/SOB/F/C/D/C, no dysuria, burning on urination or blood in urine.     Gen: Pt is in NAD  HEENT: Head is atraumatic, EOM's intact, neck has FROM  Chest: CTAB, non-tender  Heart: tachycardia  Abdomen: Soft, Upper abdominal tenderness, mass near umbilicus.  Musculoskeletal: FROM in all extremities  Skin: No rash, no ecchymosis  Neuro: Awake, alert, oriented x4; Cranial nerves II-XII intact  Psych: Normal affect    MDM -  Will check CBC for leukocytosis, metabolic panel for electrolyte abnormalities and dehydration,  LFT's to assess GB dysfunction, lipase for pancreatitis, Will order ECG and troponin to rule out acute MI, CT abdomen and pelvis.     ED Course         Critical Care Time  Procedures

## 2025-01-22 DIAGNOSIS — E55.9 VITAMIN D DEFICIENCY: ICD-10-CM

## 2025-01-23 RX ORDER — ERGOCALCIFEROL 1.25 MG/1
50000 CAPSULE, LIQUID FILLED ORAL WEEKLY
Qty: 12 CAPSULE | Refills: 0 | Status: SHIPPED | OUTPATIENT
Start: 2025-01-23

## 2025-01-25 ENCOUNTER — HOSPITAL ENCOUNTER (OUTPATIENT)
Dept: ULTRASOUND IMAGING | Facility: HOSPITAL | Age: 55
Discharge: HOME | End: 2025-01-25
Attending: FAMILY MEDICINE
Payer: COMMERCIAL

## 2025-01-25 DIAGNOSIS — R10.13 DYSPEPSIA: ICD-10-CM

## 2025-01-25 PROCEDURE — 76705 ECHO EXAM OF ABDOMEN: CPT

## 2025-01-31 DIAGNOSIS — I10 ESSENTIAL HYPERTENSION: ICD-10-CM

## 2025-01-31 DIAGNOSIS — E66.811 OBESITY, CLASS I, BMI 30-34.9: ICD-10-CM

## 2025-01-31 RX ORDER — TIRZEPATIDE 5 MG/.5ML
INJECTION, SOLUTION SUBCUTANEOUS
Qty: 2 ML | Refills: 1 | OUTPATIENT
Start: 2025-01-31

## 2025-01-31 RX ORDER — TIRZEPATIDE 7.5 MG/.5ML
7.5 INJECTION, SOLUTION SUBCUTANEOUS WEEKLY
Qty: 2 ML | Refills: 1 | Status: SHIPPED | OUTPATIENT
Start: 2025-01-31 | End: 2025-03-28

## 2025-02-03 ENCOUNTER — RESULTS FOLLOW-UP (OUTPATIENT)
Dept: FAMILY MEDICINE CLINIC | Facility: CLINIC | Age: 55
End: 2025-02-03

## 2025-03-03 ENCOUNTER — OFFICE VISIT (OUTPATIENT)
Dept: GASTROENTEROLOGY | Facility: MEDICAL CENTER | Age: 55
End: 2025-03-03
Payer: COMMERCIAL

## 2025-03-03 VITALS
HEART RATE: 85 BPM | BODY MASS INDEX: 33.37 KG/M2 | OXYGEN SATURATION: 98 % | TEMPERATURE: 97.9 F | HEIGHT: 63 IN | SYSTOLIC BLOOD PRESSURE: 129 MMHG | DIASTOLIC BLOOD PRESSURE: 92 MMHG

## 2025-03-03 DIAGNOSIS — Z12.11 SCREENING FOR COLON CANCER: ICD-10-CM

## 2025-03-03 DIAGNOSIS — Z90.3 HISTORY OF SLEEVE GASTRECTOMY: ICD-10-CM

## 2025-03-03 DIAGNOSIS — E66.811 CLASS 1 OBESITY DUE TO EXCESS CALORIES WITHOUT SERIOUS COMORBIDITY WITH BODY MASS INDEX (BMI) OF 33.0 TO 33.9 IN ADULT: ICD-10-CM

## 2025-03-03 DIAGNOSIS — R14.0 BLOATING: Primary | ICD-10-CM

## 2025-03-03 DIAGNOSIS — R11.2 NAUSEA AND VOMITING, UNSPECIFIED VOMITING TYPE: ICD-10-CM

## 2025-03-03 DIAGNOSIS — E66.09 CLASS 1 OBESITY DUE TO EXCESS CALORIES WITHOUT SERIOUS COMORBIDITY WITH BODY MASS INDEX (BMI) OF 33.0 TO 33.9 IN ADULT: ICD-10-CM

## 2025-03-03 DIAGNOSIS — K21.9 GASTROESOPHAGEAL REFLUX DISEASE WITHOUT ESOPHAGITIS: ICD-10-CM

## 2025-03-03 PROBLEM — K29.70 HELICOBACTER PYLORI GASTRITIS: Status: RESOLVED | Noted: 2023-05-23 | Resolved: 2025-03-03

## 2025-03-03 PROBLEM — B96.81 HELICOBACTER PYLORI GASTRITIS: Status: RESOLVED | Noted: 2023-05-23 | Resolved: 2025-03-03

## 2025-03-03 PROCEDURE — 99214 OFFICE O/P EST MOD 30 MIN: CPT | Performed by: STUDENT IN AN ORGANIZED HEALTH CARE EDUCATION/TRAINING PROGRAM

## 2025-03-03 RX ORDER — OMEPRAZOLE 40 MG/1
40 CAPSULE, DELAYED RELEASE ORAL DAILY
Qty: 30 CAPSULE | Refills: 11 | Status: SHIPPED | OUTPATIENT
Start: 2025-03-03 | End: 2025-03-03

## 2025-03-03 RX ORDER — OMEPRAZOLE 40 MG/1
40 CAPSULE, DELAYED RELEASE ORAL DAILY
Qty: 30 CAPSULE | Refills: 11 | Status: SHIPPED | OUTPATIENT
Start: 2025-03-03

## 2025-03-03 RX ORDER — SODIUM CHLORIDE, SODIUM LACTATE, POTASSIUM CHLORIDE, CALCIUM CHLORIDE 600; 310; 30; 20 MG/100ML; MG/100ML; MG/100ML; MG/100ML
125 INJECTION, SOLUTION INTRAVENOUS CONTINUOUS
OUTPATIENT
Start: 2025-03-03

## 2025-03-03 RX ORDER — SIMETHICONE 125 MG
125 TABLET,CHEWABLE ORAL EVERY 6 HOURS PRN
Qty: 30 TABLET | Refills: 5 | Status: SHIPPED | OUTPATIENT
Start: 2025-03-03

## 2025-03-03 NOTE — PROGRESS NOTES
Name: Florencia Stewart      : 1970      MRN: [id number]  Encounter Provider: Yasmeen Culp MD  Encounter Date: 3/3/2025   [facility]  :  Assessment & Plan  Bloating  Abdominal pain and bloating have been an issue for several years.  Duodenal biopsies negative for celiac disease .  SIBO testing was negative in .  She was found to have H. pylori gastritis however stool antigen after completing quadruple therapy was negative.  Picture is now complicated as she has started Zepbound.  I am suspicious her symptoms are a combination of altered motility given her sleeve gastrectomy, GLP-1 a usage, surgical adhesions, etc.    Given her new vomiting, will plan for EGD.  Orders:    simethicone (MYLICON) 125 MG chewable tablet; Chew 1 tablet (125 mg total) every 6 (six) hours as needed (bloating or gas)    EGD; Future  If EGD unrevealing and symptoms do not improve on omeprazole, would highly recommend stopping Zepbound to see how this affects symptoms.  Nausea and vomiting, unspecified vomiting type  As above  Orders:    omeprazole (PriLOSEC) 40 MG capsule; Take 1 capsule (40 mg total) by mouth daily    EGD; Future    History of sleeve gastrectomy  As above  Orders:    omeprazole (PriLOSEC) 40 MG capsule; Take 1 capsule (40 mg total) by mouth daily    EGD; Future    Gastroesophageal reflux disease without esophagitis  As above  Orders:    omeprazole (PriLOSEC) 40 MG capsule; Take 1 capsule (40 mg total) by mouth daily    EGD; Future    Class 1 obesity due to excess calories without serious comorbidity with body mass index (BMI) of 33.0 to 33.9 in adult  Consider trial off GLP-1 agonist as this is likely contributing to her upper GI symptoms.       Screening for colon cancer  No polyps , 10 year recall           History of Present Illness   HPI  Florencia Stewart is a 54 y.o. female who presents with ongoing abdominal pain, bloating  "and newer onset vomiting.    Having stomach issues for past year.  For past 3 months, having issues with vomiting after eating.  Usually happens right after.  Has been on Zepbound for 2 months.    Has been off PPI for 6 months    SIBO testing 11/27/24 negative    8/28/23 H pylori stool antigen    EGD 4/24/23  The esophagus appeared normal.  Irregular Z-line 37 cm from the incisors; performed cold forceps biopsy. One possible small (1cm) tongue of Villela's esophagus.  Previous sleeve gastrectomy in the stomach  Otherwise normal stomach. Random biopsies were done.  Possible hiatal hernia.  The duodenum appeared normal. Performed random biopsy using biopsy forceps. The bulb and 2nd portion were visualized.   Pathology 4/24/23  A.  Duodenum (biopsy):     - Small bowel mucosa with no significant pathologic abnormalities.     - No villous atrophy, increased intraepithelial lymphocytes or crypt hyperplasia to suggest malabsorptive enteropathy.     - No active inflammation, granulomas, organisms, dysplasia or neoplasia identified.     B.  Stomach (biopsy):     - Helicobacter-associated active chronic gastritis involving oxyntic and foveolar mucosa.     - Immunostain for Helicobacter is positive (control stains appropriately).     - Few crushed glands noted, favor procedural artifact.     - No intestinal metaplasia.     C.  Esophagogastric junction (biopsy):     - Cardiac gastric and squamous junctional mucosa with mild subacute and chronic inflammation.     - Squamous eosinophils number less than 2 per HPF.     - No intestinal metaplasia, dysplasia or neoplasia identified.        Review of Systems       Objective   /92 (BP Location: Right arm)   Pulse 85   Temp 97.9 °F (36.6 °C)   Ht 5' 3\" (1.6 m)   LMP 12/15/2018 (Approximate) Comment: Hx of tubal ligation  SpO2 98%   BMI 33.37 kg/m²      Physical Exam      "

## 2025-03-04 ENCOUNTER — TELEPHONE (OUTPATIENT)
Dept: GASTROENTEROLOGY | Facility: MEDICAL CENTER | Age: 55
End: 2025-03-04

## 2025-03-04 NOTE — TELEPHONE ENCOUNTER
Procedure: EGD  Date: 03/19/2025  Physician performing: Dr. Culp  Location of procedure:    Instructions given to patient: EGD  Diabetic: Yes   Clearances: N/A    Patient is on Zepbound 7 day hold pt is aware   Patient has scheduled follow up

## 2025-03-05 ENCOUNTER — OFFICE VISIT (OUTPATIENT)
Dept: FAMILY MEDICINE CLINIC | Facility: CLINIC | Age: 55
End: 2025-03-05

## 2025-03-05 ENCOUNTER — TELEPHONE (OUTPATIENT)
Age: 55
End: 2025-03-05

## 2025-03-05 VITALS
HEIGHT: 63 IN | BODY MASS INDEX: 32.96 KG/M2 | TEMPERATURE: 98.7 F | SYSTOLIC BLOOD PRESSURE: 118 MMHG | DIASTOLIC BLOOD PRESSURE: 70 MMHG | RESPIRATION RATE: 18 BRPM | WEIGHT: 186 LBS

## 2025-03-05 DIAGNOSIS — E88.1 LIPODYSTROPHY: ICD-10-CM

## 2025-03-05 DIAGNOSIS — I71.21 ANEURYSM OF ASCENDING AORTA WITHOUT RUPTURE (HCC): ICD-10-CM

## 2025-03-05 DIAGNOSIS — Z23 ENCOUNTER FOR IMMUNIZATION: ICD-10-CM

## 2025-03-05 DIAGNOSIS — R73.03 PREDIABETES: ICD-10-CM

## 2025-03-05 DIAGNOSIS — E78.2 MIXED HYPERLIPIDEMIA: ICD-10-CM

## 2025-03-05 DIAGNOSIS — R10.13 DYSPEPSIA: ICD-10-CM

## 2025-03-05 DIAGNOSIS — I10 ESSENTIAL HYPERTENSION: Primary | ICD-10-CM

## 2025-03-05 DIAGNOSIS — K76.0 HEPATIC STEATOSIS: ICD-10-CM

## 2025-03-05 PROCEDURE — 3074F SYST BP LT 130 MM HG: CPT | Performed by: FAMILY MEDICINE

## 2025-03-05 PROCEDURE — 90750 HZV VACC RECOMBINANT IM: CPT

## 2025-03-05 PROCEDURE — 90471 IMMUNIZATION ADMIN: CPT

## 2025-03-05 PROCEDURE — 99214 OFFICE O/P EST MOD 30 MIN: CPT | Performed by: FAMILY MEDICINE

## 2025-03-05 PROCEDURE — 3078F DIAST BP <80 MM HG: CPT | Performed by: FAMILY MEDICINE

## 2025-03-05 RX ORDER — AMLODIPINE BESYLATE 10 MG/1
10 TABLET ORAL DAILY
Qty: 90 TABLET | Refills: 3 | Status: SHIPPED | OUTPATIENT
Start: 2025-03-05 | End: 2025-06-03

## 2025-03-05 RX ORDER — METOPROLOL TARTRATE 25 MG/1
12.5 TABLET, FILM COATED ORAL EVERY 12 HOURS SCHEDULED
Qty: 90 TABLET | Refills: 1 | Status: SHIPPED | OUTPATIENT
Start: 2025-03-05

## 2025-03-05 RX ORDER — ATORVASTATIN CALCIUM 80 MG/1
80 TABLET, FILM COATED ORAL DAILY
Qty: 90 TABLET | Refills: 2 | Status: SHIPPED | OUTPATIENT
Start: 2025-03-05 | End: 2025-06-03

## 2025-03-05 NOTE — ASSESSMENT & PLAN NOTE
Recommend weight loss  Continue Zepbound injection prescribed by weight management  Reinforced importance of dietary modifications

## 2025-03-05 NOTE — ASSESSMENT & PLAN NOTE
Patient with chronic abdominal pain with bloating  Multiple GI studies in the past including EGD which has been unremarkable  Suspect functional dyspepsia versus IBS  Continue follow-up with GI  Repeat EGD is pending in the next few months

## 2025-03-05 NOTE — PROGRESS NOTES
Name: Florencia Stewart      : 1970      MRN: [id number]  Encounter Provider: Rick Hua MD  Encounter Date: 3/5/2025   Encounter department: [facility]  :  Assessment & Plan  Essential hypertension  Stable  Continue amlodipine and metoprolol  Blood pressure goal is less than 130/80  Orders:    amLODIPine (NORVASC) 10 mg tablet; Take 1 tablet (10 mg total) by mouth daily    Aneurysm of ascending aorta without rupture (HCC)  Stable  Continues optimize blood pressure control and avoid persistent strenuous activity.   Most recent CT scan showed 4.3 cm aortic aneurysm  Will need repeat CT scan in  and will need to follow-up with cardiothoracic surgery  ED precautions provided for sudden onset chest pain  Orders:    metoprolol tartrate (LOPRESSOR) 25 mg tablet; Take 0.5 tablets (12.5 mg total) by mouth every 12 (twelve) hours    Hepatic steatosis  Recommend weight loss  Continue Zepbound injection prescribed by weight management  Reinforced importance of dietary modifications       Prediabetes  Recommend low carbohydrate diet  Recommend that she rechecks her hemoglobin A1c       Mixed hyperlipidemia  Uncontrolled.  Last LDL was 187 and triglycerides of 217  Continue high-dose statin.  Counseled on importance of medication adherence  Recheck lipid panel  Consider addition of Zetia.  Orders:    atorvastatin (LIPITOR) 80 mg tablet; Take 1 tablet (80 mg total) by mouth daily    Lipodystrophy    Orders:    atorvastatin (LIPITOR) 80 mg tablet; Take 1 tablet (80 mg total) by mouth daily    Encounter for immunization    Orders:    Zoster Vaccine Recombinant IM    Dyspepsia  Patient with chronic abdominal pain with bloating  Multiple GI studies in the past including EGD which has been unremarkable  Suspect functional dyspepsia versus IBS  Continue follow-up with GI  Repeat EGD is pending in the next few months                History of Present Illness  "  54-year-old female with a history of hypertension, ascending aortic aneurysm, hyperlipidemia, hepatic steatosis, and obesity who presents today for general follow-up.  She is doing well overall.  She continues to have chronic abdominal pain and bloating.  She is following with GI with this concern.  She is going to have another repeat EGD.  She has made dietary changes with no significant improvement in her GI symptoms.      Review of Systems   Constitutional:  Negative for appetite change, chills, diaphoresis, fatigue and fever.   Eyes:  Negative for visual disturbance.   Respiratory:  Negative for shortness of breath and wheezing.    Cardiovascular:  Negative for chest pain and palpitations.   Gastrointestinal:  Positive for abdominal pain and nausea. Negative for abdominal distention, blood in stool, rectal pain and vomiting.   Genitourinary:  Negative for dysuria, hematuria and urgency.   Musculoskeletal:  Negative for back pain.   Skin:  Negative for rash.   Neurological:  Negative for dizziness, seizures, syncope, light-headedness and headaches.   Psychiatric/Behavioral:  Negative for dysphoric mood.        Objective   /70 (BP Location: Right arm, Patient Position: Sitting, Cuff Size: Standard)   Temp 98.7 °F (37.1 °C) (Temporal)   Resp 18   Ht 5' 3\" (1.6 m)   Wt 84.4 kg (186 lb)   LMP 12/15/2018 (Approximate) Comment: Hx of tubal ligation  Breastfeeding No   BMI 32.95 kg/m²      Physical Exam  Constitutional:       General: She is not in acute distress.     Appearance: Normal appearance. She is well-developed. She is obese. She is not ill-appearing, toxic-appearing or diaphoretic.   HENT:      Head: Normocephalic and atraumatic.      Right Ear: External ear normal.      Left Ear: External ear normal.      Nose: Nose normal.      Mouth/Throat:      Mouth: Mucous membranes are moist.   Eyes:      General: No scleral icterus.        Right eye: No discharge.         Left eye: No discharge.      " Extraocular Movements: Extraocular movements intact.      Pupils: Pupils are equal, round, and reactive to light.   Cardiovascular:      Rate and Rhythm: Normal rate and regular rhythm.      Heart sounds: Normal heart sounds. No murmur heard.     No friction rub. No gallop.   Pulmonary:      Effort: Pulmonary effort is normal. No respiratory distress.      Breath sounds: Normal breath sounds. No stridor. No wheezing or rhonchi.   Abdominal:      General: Bowel sounds are normal. There is no distension.      Palpations: Abdomen is soft. There is no mass.      Tenderness: There is no abdominal tenderness. There is no guarding or rebound.   Musculoskeletal:         General: Normal range of motion.      Cervical back: Normal range of motion.      Right lower leg: No edema.      Left lower leg: No edema.   Skin:     General: Skin is warm.      Findings: No lesion or rash.   Neurological:      General: No focal deficit present.      Mental Status: She is alert and oriented to person, place, and time.      Cranial Nerves: No cranial nerve deficit.      Sensory: No sensory deficit.      Motor: No weakness.      Gait: Gait normal.   Psychiatric:         Mood and Affect: Mood normal.         Behavior: Behavior normal.

## 2025-03-05 NOTE — ASSESSMENT & PLAN NOTE
Stable  Continue amlodipine and metoprolol  Blood pressure goal is less than 130/80  Orders:    amLODIPine (NORVASC) 10 mg tablet; Take 1 tablet (10 mg total) by mouth daily

## 2025-03-05 NOTE — TELEPHONE ENCOUNTER
Patient calling in and needing to reschedule her appt for tomorrow with Mattie     Please call patient back to reschedule appt   Weakness, Anemia

## 2025-03-05 NOTE — ASSESSMENT & PLAN NOTE
Uncontrolled.  Last LDL was 187 and triglycerides of 217  Continue high-dose statin.  Counseled on importance of medication adherence  Recheck lipid panel  Consider addition of Zetia.  Orders:    atorvastatin (LIPITOR) 80 mg tablet; Take 1 tablet (80 mg total) by mouth daily

## 2025-03-05 NOTE — ASSESSMENT & PLAN NOTE
Stable  Continues optimize blood pressure control and avoid persistent strenuous activity.   Most recent CT scan showed 4.3 cm aortic aneurysm  Will need repeat CT scan in 2025 and will need to follow-up with cardiothoracic surgery  ED precautions provided for sudden onset chest pain  Orders:    metoprolol tartrate (LOPRESSOR) 25 mg tablet; Take 0.5 tablets (12.5 mg total) by mouth every 12 (twelve) hours

## 2025-03-06 ENCOUNTER — TELEPHONE (OUTPATIENT)
Dept: BARIATRICS | Facility: CLINIC | Age: 55
End: 2025-03-06

## 2025-03-18 RX ORDER — SODIUM CHLORIDE, SODIUM LACTATE, POTASSIUM CHLORIDE, CALCIUM CHLORIDE 600; 310; 30; 20 MG/100ML; MG/100ML; MG/100ML; MG/100ML
50 INJECTION, SOLUTION INTRAVENOUS CONTINUOUS
Status: CANCELLED | OUTPATIENT
Start: 2025-03-18

## 2025-03-19 ENCOUNTER — ANESTHESIA (OUTPATIENT)
Dept: GASTROENTEROLOGY | Facility: HOSPITAL | Age: 55
End: 2025-03-19
Payer: COMMERCIAL

## 2025-03-19 ENCOUNTER — ANESTHESIA EVENT (OUTPATIENT)
Dept: GASTROENTEROLOGY | Facility: HOSPITAL | Age: 55
End: 2025-03-19
Payer: COMMERCIAL

## 2025-03-19 ENCOUNTER — HOSPITAL ENCOUNTER (OUTPATIENT)
Dept: GASTROENTEROLOGY | Facility: HOSPITAL | Age: 55
Setting detail: OUTPATIENT SURGERY
Discharge: HOME | End: 2025-03-19
Attending: STUDENT IN AN ORGANIZED HEALTH CARE EDUCATION/TRAINING PROGRAM
Payer: COMMERCIAL

## 2025-03-19 ENCOUNTER — OFFICE VISIT (OUTPATIENT)
Dept: BARIATRICS | Facility: CLINIC | Age: 55
End: 2025-03-19
Payer: COMMERCIAL

## 2025-03-19 VITALS
OXYGEN SATURATION: 99 % | HEART RATE: 77 BPM | DIASTOLIC BLOOD PRESSURE: 74 MMHG | TEMPERATURE: 97.6 F | RESPIRATION RATE: 16 BRPM | SYSTOLIC BLOOD PRESSURE: 131 MMHG

## 2025-03-19 VITALS
DIASTOLIC BLOOD PRESSURE: 80 MMHG | BODY MASS INDEX: 32.85 KG/M2 | RESPIRATION RATE: 16 BRPM | HEART RATE: 65 BPM | HEIGHT: 63 IN | SYSTOLIC BLOOD PRESSURE: 120 MMHG | TEMPERATURE: 96.8 F | WEIGHT: 185.4 LBS

## 2025-03-19 DIAGNOSIS — E78.2 MIXED HYPERLIPIDEMIA: ICD-10-CM

## 2025-03-19 DIAGNOSIS — I10 ESSENTIAL HYPERTENSION: ICD-10-CM

## 2025-03-19 DIAGNOSIS — R11.2 NAUSEA AND VOMITING, UNSPECIFIED VOMITING TYPE: ICD-10-CM

## 2025-03-19 DIAGNOSIS — K21.9 GASTROESOPHAGEAL REFLUX DISEASE WITHOUT ESOPHAGITIS: ICD-10-CM

## 2025-03-19 DIAGNOSIS — E66.811 OBESITY, CLASS I, BMI 30-34.9: ICD-10-CM

## 2025-03-19 DIAGNOSIS — Z90.3 HISTORY OF SLEEVE GASTRECTOMY: ICD-10-CM

## 2025-03-19 DIAGNOSIS — R73.03 PREDIABETES: ICD-10-CM

## 2025-03-19 DIAGNOSIS — E66.811 OBESITY, CLASS I, BMI 30-34.9: Primary | ICD-10-CM

## 2025-03-19 DIAGNOSIS — R14.0 BLOATING: ICD-10-CM

## 2025-03-19 PROCEDURE — 43239 EGD BIOPSY SINGLE/MULTIPLE: CPT | Performed by: STUDENT IN AN ORGANIZED HEALTH CARE EDUCATION/TRAINING PROGRAM

## 2025-03-19 PROCEDURE — 88342 IMHCHEM/IMCYTCHM 1ST ANTB: CPT | Performed by: PATHOLOGY

## 2025-03-19 PROCEDURE — 88305 TISSUE EXAM BY PATHOLOGIST: CPT | Performed by: PATHOLOGY

## 2025-03-19 PROCEDURE — 99214 OFFICE O/P EST MOD 30 MIN: CPT | Performed by: PHYSICIAN ASSISTANT

## 2025-03-19 RX ORDER — SODIUM CHLORIDE, SODIUM LACTATE, POTASSIUM CHLORIDE, CALCIUM CHLORIDE 600; 310; 30; 20 MG/100ML; MG/100ML; MG/100ML; MG/100ML
50 INJECTION, SOLUTION INTRAVENOUS CONTINUOUS
Status: DISCONTINUED | OUTPATIENT
Start: 2025-03-19 | End: 2025-03-23 | Stop reason: HOSPADM

## 2025-03-19 RX ORDER — LIDOCAINE HYDROCHLORIDE 10 MG/ML
INJECTION, SOLUTION EPIDURAL; INFILTRATION; INTRACAUDAL; PERINEURAL AS NEEDED
Status: DISCONTINUED | OUTPATIENT
Start: 2025-03-19 | End: 2025-03-19

## 2025-03-19 RX ORDER — TIRZEPATIDE 10 MG/.5ML
10 INJECTION, SOLUTION SUBCUTANEOUS WEEKLY
Qty: 2 ML | Refills: 3 | Status: SHIPPED | OUTPATIENT
Start: 2025-03-19

## 2025-03-19 RX ORDER — PROPOFOL 10 MG/ML
INJECTION, EMULSION INTRAVENOUS AS NEEDED
Status: DISCONTINUED | OUTPATIENT
Start: 2025-03-19 | End: 2025-03-19

## 2025-03-19 RX ORDER — TIRZEPATIDE 7.5 MG/.5ML
7.5 INJECTION, SOLUTION SUBCUTANEOUS WEEKLY
Qty: 2 ML | Refills: 1 | OUTPATIENT
Start: 2025-03-19

## 2025-03-19 RX ORDER — SODIUM CHLORIDE, SODIUM LACTATE, POTASSIUM CHLORIDE, CALCIUM CHLORIDE 600; 310; 30; 20 MG/100ML; MG/100ML; MG/100ML; MG/100ML
125 INJECTION, SOLUTION INTRAVENOUS CONTINUOUS
Status: DISCONTINUED | OUTPATIENT
Start: 2025-03-19 | End: 2025-03-23 | Stop reason: HOSPADM

## 2025-03-19 RX ADMIN — PROPOFOL 50 MG: 10 INJECTION, EMULSION INTRAVENOUS at 13:11

## 2025-03-19 RX ADMIN — PROPOFOL 50 MG: 10 INJECTION, EMULSION INTRAVENOUS at 13:13

## 2025-03-19 RX ADMIN — LIDOCAINE HYDROCHLORIDE 50 MG: 10 INJECTION, SOLUTION EPIDURAL; INFILTRATION; INTRACAUDAL at 13:09

## 2025-03-19 RX ADMIN — PROPOFOL 120 MG: 10 INJECTION, EMULSION INTRAVENOUS at 13:09

## 2025-03-19 RX ADMIN — SODIUM CHLORIDE, SODIUM LACTATE, POTASSIUM CHLORIDE, AND CALCIUM CHLORIDE: .6; .31; .03; .02 INJECTION, SOLUTION INTRAVENOUS at 13:04

## 2025-03-19 RX ADMIN — PROPOFOL 50 MG: 10 INJECTION, EMULSION INTRAVENOUS at 13:15

## 2025-03-19 NOTE — PROGRESS NOTES
Assessment & Plan  Obesity, Class I, BMI 30-34.9  Continue Zepbound -- so far she has only had 3.14% weight loss since starting Zepbound  Will increase Zepbound to 10mg weekly an remain on this dose until the next visit  Recommend changing breakfast of Toast/OJ to a higher protein/lower carb-- discussed options. Suggest stop drinking orange juice and change to small cassandra/mandarin instead.   Discussed importance of regular exercise.  Continue Regular walking. Recommend  working up to 150-300  minutes per week including full body strength training 2x per week. Strength training videos sent via [employer].    Recommend referral to RD for follow up visit due to history of bariatric surgeyr.   Prediabetes  Expect improvement with weight loss, repeat testing ordered by PCP, Cc'd myself on results.   Mixed hyperlipidemia  Following with PCP, she reports that she has been taking atorvastatin, Expect improvement with weight loss, repeat testing ordered by PCP.        Return in about 3 months (around 6/19/2025).         Subjective:   Chief Complaint   Patient presents with   • Follow-up     MWM-F/u; Waist-37in       Patient ID: Florencia Stewart  is a 55 y.o. female with excess weight/obesity here for Weight Management Follow up Visit    HPI: Here for Medical Weight Management Follow Up Visit    -S/P sleeve gastrectomy approximately May 2019 w/dr chandler   Weight was 227lb prior to surgery. Earl was 160lb. She had started to gain weight about 2 year after surgery.     Obesity/Excess Weight:  Current BMI: Body mass index is 32.84 kg/m².   Initial Weight: 186  Last visit Weight: 191  Current Weight: 185    Current Weight Management Plan:   Current Medication: Zepbound 7.5mg  Zepbound started 10/11/2025  Medication Side effects:  None       Food Recall:  Breakfast: Wheat bread with Butter and OJ  Lunch: Soup-- chicken noodle, homemade  Dinner: Chicken with salad or salmon  Occasionally snacks-- cottage cheese/pineapple.  "      Hydration: A lot of water   Dining out/takeout: None    Lifestyle History:  Alcohol: None  Exercise: Walking--- 30-60 mins 3x per week.   Occupation: [employer]      Wt Readings from Last 20 Encounters:   03/19/25 84.1 kg (185 lb 6.4 oz)   03/06/25 84.4 kg (186 lb)   03/05/25 84.4 kg (186 lb)   01/13/25 85.7 kg (188 lb 15 oz)   01/13/25 85.5 kg (188 lb 6.4 oz)   01/02/25 85.7 kg (189 lb)   12/05/24 85.9 kg (189 lb 4.8 oz)   10/11/24 86.8 kg (191 lb 6.4 oz)   09/24/24 90.7 kg (200 lb)   09/16/24 90.8 kg (200 lb 3.2 oz)   09/05/24 88 kg (194 lb)   02/05/24 83.5 kg (184 lb)   01/31/24 82.1 kg (181 lb)   12/05/23 84.7 kg (186 lb 12.8 oz)   11/16/23 83.9 kg (185 lb)   10/19/23 84.7 kg (186 lb 12.8 oz)   08/16/23 86.6 kg (191 lb)   08/10/23 83.9 kg (185 lb)   08/09/23 87.2 kg (192 lb 3.2 oz)   07/30/23 83.6 kg (184 lb 4.9 oz)        Review of Systems    Past Medical History:   Diagnosis Date   • Anemia     4/1/22 Pt reports hx of anemia - no current issues at this time    • Anesthesia     4/1/22 Pt reports her mother had reaction to anesthesia - reports her mother \"didnt wake up for two weeks\"    • Aortic aneurysm (HCC)    • Breast cancer (HCC) 01/20/2017    left side   • Depression     4/1/22 Hx of depression - resolved per pt at this time    • Diabetes mellitus (HCC)    • GERD (gastroesophageal reflux disease)    • Helicobacter pylori gastritis 05/23/2023   • History of chemotherapy 2017    chest radiation   • History of COVID-19     4/1/22 Pt reports COVID in 2021   • Hyperlipidemia    • Hypertension    • Left wrist pain 07/22/2019   • Migraine    • Obesity     Post medical management with Phentermine/topiramate    • Prediabetes    • Sleep apnea     Hx of sleep apnea - using CPAP in the past but since gastric surgery no further issues    • Tendinitis     4/1/22 Pt reports right hand tendinitis        Past Surgical History:   Procedure Laterality Date   • ABDOMINOPLASTY N/A 04/06/2022    Procedure: ABDOMINOPLASTY;  " "Surgeon: Blade Whitfield MD;  Location:  MAIN OR;  Service: Plastics   • BREAST BIOPSY Left 12/06/2016    stereo   • BREAST LUMPECTOMY W/ NEEDLE LOCALIZATION Left 01/20/2017    RADIATION,HORMONE THERAPY  (DCIS LEFT BREAST)   • COLONOSCOPY     • GASTRECTOMY SLEEVE LAPAROSCOPIC     • LIPOSUCTION  12/2023   • WI EXC EXCSV SKN ABD INFRAUMBILICAL PANNICULECTOMY N/A 04/06/2022    Procedure: LIPECTOMY;  Surgeon: Blade Whitfield MD;  Location:  MAIN OR;  Service: Plastics   • WI MASTOPEXY Bilateral 10/06/2022    Procedure: MASTOPEXY BREAST;  Surgeon: Blade Whitfield MD;  Location:  MAIN OR;  Service: Plastics   • REDUCTION MAMMAPLASTY     • TUBAL LIGATION     • WRIST SURGERY Right         No Known Allergies     Objective:    /80   Pulse 65   Temp (!) 96.8 °F (36 °C)   Resp 16   Ht 5' 3\" (1.6 m)   Wt 84.1 kg (185 lb 6.4 oz)   LMP 12/15/2018 (Approximate) Comment: Hx of tubal ligation  BMI 32.84 kg/m²     Physical Exam:  Constitutional:  she  appears well-developed and well-nourished. No distress.   HENT:   Head: Normocephalic and atraumatic.   Neck: Normal range of motion.   Pulmonary/Chest: Effort normal.   Musculoskeletal: Normal range of motion.   Neurological: she  is alert and oriented to person, place, and time.   Skin: she  is not diaphoretic.   Psychiatric: she  has a normal mood and affect. her  behavior is normal.        LABS:    Lab Results   Component Value Date    HGBA1C 6.3 (H) 09/24/2024      Lab Results   Component Value Date     05/11/2018    SODIUM 139 01/17/2025    K 3.9 01/17/2025     01/17/2025    CO2 26 01/17/2025    ANIONGAP 14 05/11/2018    AGAP 8 01/17/2025    BUN 21 01/17/2025    CREATININE 0.95 01/17/2025    GLUC 108 01/17/2025    GLUF 104 (H) 09/24/2024    CALCIUM 10.1 01/17/2025    AST 21 01/17/2025    ALT 25 01/17/2025    ALKPHOS 112 (H) 01/17/2025    TP 8.6 (H) 01/17/2025    TBILI 0.62 01/17/2025    EGFR 68 01/17/2025 09/24/2024   Lab Results   Component " Value Date    KLQ5GGTAFEOR 2.590 09/27/2021

## 2025-03-19 NOTE — ANESTHESIA POSTPROCEDURE EVALUATION
Post-Op Assessment Note    CV Status:  Stable  Pain Score: 0    Pain management: adequate       Mental Status:  Alert and awake   Hydration Status:  Euvolemic   PONV Controlled:  Controlled   Airway Patency:  Patent     Post Op Vitals Reviewed: Yes    No anethesia notable event occurred.    Staff: Anesthesiologist, CRNA       Last Filed PACU Vitals:  Vitals Value Taken Time   Temp 97.6 °F (36.4 °C) 03/19/25 1321   Pulse 84 03/19/25 1321   /69 03/19/25 1321   Resp 16 03/19/25 1321   SpO2 95 % 03/19/25 1321       Modified Joby:     Vitals Value Taken Time   Activity 1 03/19/25 1321   Respiration 2 03/19/25 1321   Circulation 2 03/19/25 1321   Consciousness 1 03/19/25 1321   Oxygen Saturation 2 03/19/25 1321     Modified Joby Score: 8

## 2025-03-19 NOTE — ASSESSMENT & PLAN NOTE
Following with PCP, she reports that she has been taking atorvastatin, Expect improvement with weight loss, repeat testing ordered by PCP.

## 2025-03-19 NOTE — ANESTHESIA PREPROCEDURE EVALUATION
Procedure:  EGD    Relevant Problems   CARDIO   (+) Ascending aortic aneurysm (HCC)   (+) Essential hypertension   (+) Mixed hyperlipidemia      GI/HEPATIC   (+) Hepatic steatosis      MUSCULOSKELETAL   (+) Chronic midline low back pain without sciatica      NEURO/PSYCH   (+) Chronic midline low back pain without sciatica      Ear/Nose/Throat   (+) Benign paroxysmal positional vertigo due to bilateral vestibular disorder   (+) Deviated nasal septum      Oncology   (+) Ductal carcinoma in situ (DCIS) of breast      Surgery/Wound/Pain   (+) History of bilateral breast reduction surgery   (+) Status post laparoscopic sleeve gastrectomy      Other   (+) Dyspepsia   (+) History of diabetes mellitus        Physical Exam    Airway    Mallampati score: II  TM Distance: >3 FB  Neck ROM: full     Dental   No notable dental hx     Cardiovascular  Cardiovascular exam normal    Pulmonary  Pulmonary exam normal     Other Findings  post-pubertal.      Anesthesia Plan  ASA Score- 3     Anesthesia Type- IV sedation with anesthesia with ASA Monitors.         Additional Monitors:     Airway Plan:            Plan Factors-Exercise tolerance (METS): >4 METS.    Chart reviewed. EKG reviewed.  Existing labs reviewed. Patient summary reviewed.    Patient is not a current smoker. Patient not instructed to abstain from smoking on day of procedure. Patient did not smoke on day of surgery.            Induction-     Postoperative Plan-     Perioperative Resuscitation Plan - Level 1 - Full Code.       Informed Consent- Anesthetic plan and risks discussed with patient.  I personally reviewed this patient with the CRNA. Discussed and agreed on the Anesthesia Plan with the CRNA..      NPO Status:  Vitals Value Taken Time   Date of last liquid 03/19/25 03/19/25 1218   Time of last liquid 0700 03/19/25 1218   Date of last solid 03/18/25 03/19/25 1218   Time of last solid 2000 03/19/25 1218         Lab Results   Component Value Date    HGBA1C 6.3 (H)  09/24/2024       Lab Results   Component Value Date     05/11/2018    K 3.9 01/17/2025     01/17/2025    CO2 26 01/17/2025    ANIONGAP 14 05/11/2018    BUN 21 01/17/2025    CREATININE 0.95 01/17/2025    GLUCOSE 105 (H) 05/11/2018    GLUF 104 (H) 09/24/2024    CALCIUM 10.1 01/17/2025    CORRECTEDCA 9.5 02/05/2019    AST 21 01/17/2025    ALT 25 01/17/2025    ALKPHOS 112 (H) 01/17/2025    EGFR 68 01/17/2025       Lab Results   Component Value Date    WBC 10.22 (H) 01/17/2025    HGB 16.1 (H) 01/17/2025    HCT 49.6 (H) 01/17/2025    MCV 89 01/17/2025     01/17/2025     Sinus tachycardia  Low voltage QRS  Nonspecific T wave abnormality  Abnormal ECG  When compared with ECG of 17-Jan-2025 19:09, (unconfirmed)  Nonspecific T wave abnormality now evident in Inferior leads  Nonspecific T wave abnormality, worse in Lateral leads  Confirmed by Osmin Ramírez ([id number]) on 1/17/2025 10:51:35 PM     Specimen Collected: 01/17/25 20:40       Echo 2021 echo   1. Normal left ventricular size and systolic function, abnormal septal motion otherwise no regional wall motion abnormality. EF around 55%. Suspected early grade 1 diastolic dysfunction.  2. Normal right ventricular size and systolic function.  3. Normal left and right atrial size.  4. Aortic valve sclerosis with some focal calcification, no aortic valve stenosis or regurgitation.  5. Mitral valve sclerosis, trace mitral valve regurgitation.  6. Trace tricuspid and pulmonic valve regurgitation.  7. No obvious pulmonary hypertension.  8. No pericardial effusion.  9. Dilated aortic root and proximal ascending aorta measuring up to 4.1 cm.

## 2025-03-19 NOTE — ASSESSMENT & PLAN NOTE
Continue Zepbound -- so far she has only had 3.14% weight loss since starting Zepbound  Will increase Zepbound to 10mg weekly an remain on this dose until the next visit  Recommend changing breakfast of Toast/OJ to a higher protein/lower carb-- discussed options. Suggest stop drinking orange juice and change to small cassandra/mandarin instead.   Discussed importance of regular exercise.  Continue Regular walking. Recommend  working up to 150-300  minutes per week including full body strength training 2x per week. Strength training videos sent via [employer]    Recommend referral to RD for follow up visit due to history of bariatric surgeyr.    Patient is requesting refill for   lisinopril (ZESTRIL) 20 MG tablet 90 tablets dispensed with 0 refills   dilTIAZem (DILACOR XR) 180 MG 24 hr capsule 90 capsules dispensed with 0 refills  Last OV 5-, patient has upcoming appointment on 5-    Medication loaded set to e-prescribe pending your approval.

## 2025-03-19 NOTE — H&P
"History and Physical -  [facility]  Florencia Stewart 55 y.o. female MRN: [id number]                  HPI: Florencia Stewart is a 55 y.o. year old female who presents for GERD,bloating, nausea, gastric sleeve      REVIEW OF SYSTEMS: Per the HPI, and otherwise unremarkable.    Historical Information   Past Medical History:   Diagnosis Date    Anemia     4/1/22 Pt reports hx of anemia - no current issues at this time     Anesthesia     4/1/22 Pt reports her mother had reaction to anesthesia - reports her mother \"didnt wake up for two weeks\"     Aortic aneurysm (HCC)     Breast cancer (HCC) 01/20/2017    left side    Depression     4/1/22 Hx of depression - resolved per pt at this time     Diabetes mellitus (HCC)     GERD (gastroesophageal reflux disease)     Helicobacter pylori gastritis 05/23/2023    History of chemotherapy 2017    chest radiation    History of COVID-19     4/1/22 Pt reports COVID in 2021    Hyperlipidemia     Hypertension     Left wrist pain 07/22/2019    Migraine     Obesity     Post medical management with Phentermine/topiramate     Prediabetes     Sleep apnea     Hx of sleep apnea - using CPAP in the past but since gastric surgery no further issues     Tendinitis     4/1/22 Pt reports right hand tendinitis      Past Surgical History:   Procedure Laterality Date    ABDOMINOPLASTY N/A 04/06/2022    Procedure: ABDOMINOPLASTY;  Surgeon: Blade Whitfield MD;  Location:  MAIN OR;  Service: Plastics    BREAST BIOPSY Left 12/06/2016    stereo    BREAST LUMPECTOMY W/ NEEDLE LOCALIZATION Left 01/20/2017    RADIATION,HORMONE THERAPY  (DCIS LEFT BREAST)    COLONOSCOPY      GASTRECTOMY SLEEVE LAPAROSCOPIC      LIPOSUCTION  12/2023    MO EXC EXCSV SKN ABD INFRAUMBILICAL PANNICULECTOMY N/A 04/06/2022    Procedure: LIPECTOMY;  Surgeon: Blade Whitfield MD;  Location:  MAIN OR;  Service: Plastics    MO MASTOPEXY Bilateral 10/06/2022    Procedure: MASTOPEXY BREAST;  Surgeon: Blade" MD Roseann;  Location:  MAIN OR;  Service: Plastics    REDUCTION MAMMAPLASTY      TUBAL LIGATION      WRIST SURGERY Right      Social History   Social History     Substance and Sexual Activity   Alcohol Use Not Currently    Comment: occasionally     Social History     Substance and Sexual Activity   Drug Use Never    Comment: Denies any former or current use      Social History     Tobacco Use   Smoking Status Never    Passive exposure: Never   Smokeless Tobacco Never   Tobacco Comments    Denies former or current use     Family History   Problem Relation Age of Onset    Hyperlipidemia Mother     Diabetes Mother     Hypertension Mother     Colon cancer Father     Liver cancer Father     Hypertension Sister     No Known Problems Sister     No Known Problems Sister     Hypertension Brother     No Known Problems Maternal Grandmother     No Known Problems Maternal Grandfather     Skin cancer Paternal Grandmother     Lymphoma Paternal Grandfather     No Known Problems Maternal Aunt     No Known Problems Maternal Aunt     No Known Problems Maternal Aunt     No Known Problems Maternal Aunt     No Known Problems Paternal Aunt     No Known Problems Paternal Aunt     No Known Problems Paternal Aunt     No Known Problems Paternal Aunt     No Known Problems Daughter     No Known Problems Daughter     No Known Problems Daughter     Breast cancer Neg Hx        Meds/Allergies       Current Outpatient Medications:     amLODIPine (NORVASC) 10 mg tablet    atorvastatin (LIPITOR) 80 mg tablet    ergocalciferol (VITAMIN D2) 50,000 units    metoprolol tartrate (LOPRESSOR) 25 mg tablet    omeprazole (PriLOSEC) 40 MG capsule    simethicone (MYLICON) 125 MG chewable tablet    tirzepatide (Zepbound) 10 mg/0.5 mL auto-injector    Current Facility-Administered Medications:     Inclisiran Sodium (LEQVIO) subcutaneous injection 284 mg, 284 mg, Subcutaneous, Once    lactated ringers infusion, 50 mL/hr, Intravenous, Continuous    No Known  Allergies    Objective     /75   Pulse 66   Temp 97.6 °F (36.4 °C) (Temporal)   Resp 16   LMP 12/15/2018 (Approximate) Comment: Hx of tubal ligation  SpO2 99%       PHYSICAL EXAM    Gen: NAD  Head: NCAT  CV: RRR  CHEST: Clear  ABD: soft, NT/ND  EXT: no edema      ASSESSMENT/PLAN:  This is a 55 y.o. year old female here for EGD, and she is stable and optimized for her procedure.

## 2025-03-26 ENCOUNTER — RESULTS FOLLOW-UP (OUTPATIENT)
Dept: GASTROENTEROLOGY | Facility: CLINIC | Age: 55
End: 2025-03-26

## 2025-03-26 PROCEDURE — 88305 TISSUE EXAM BY PATHOLOGIST: CPT | Performed by: PATHOLOGY

## 2025-03-26 PROCEDURE — 88342 IMHCHEM/IMCYTCHM 1ST ANTB: CPT | Performed by: PATHOLOGY

## 2025-04-21 ENCOUNTER — TELEPHONE (OUTPATIENT)
Dept: BARIATRICS | Facility: CLINIC | Age: 55
End: 2025-04-21

## 2025-04-21 NOTE — TELEPHONE ENCOUNTER
PA for Zepbound 10 mg SUBMITTED to [employer]    via    [x]CMM-KEY: [id number]  []Surescripts-Case ID #    []Availity-Auth ID #  NDC #    []Faxed to plan   []Other website    []Phone call Case ID #      [x]PA sent as URGENT    All office notes, labs and other pertaining documents and studies sent. Clinical questions answered. Awaiting determination from insurance company.     Turnaround time for your insurance to make a decision on your Prior Authorization can take 7-21 business days.

## 2025-04-21 NOTE — TELEPHONE ENCOUNTER
PA for Zepbound 10 mg APPROVED     Date(s) approved 4/21/25/4/21/26        Patient advised by          [x]MyChart Message  []Phone call   []LMOM  []L/M to call office as no active Communication consent on file  []Unable to leave detailed message as VM not approved on Communication consent       Pharmacy advised by    []Fax  [x]Phone call  []Secure Chat    Specialty Pharmacy    []      Approval letter scanned into [employer] Yes

## 2025-06-04 ENCOUNTER — APPOINTMENT (OUTPATIENT)
Dept: LAB | Facility: HOSPITAL | Age: 55
End: 2025-06-04
Payer: COMMERCIAL

## 2025-06-04 DIAGNOSIS — R73.03 PREDIABETES: ICD-10-CM

## 2025-06-04 DIAGNOSIS — E78.2 MIXED HYPERLIPIDEMIA: ICD-10-CM

## 2025-06-04 LAB
CHOLEST SERPL-MCNC: 287 MG/DL (ref ?–200)
EST. AVERAGE GLUCOSE BLD GHB EST-MCNC: 117 MG/DL
HBA1C MFR BLD: 5.7 %
HDLC SERPL-MCNC: 48 MG/DL
LDLC SERPL CALC-MCNC: 200 MG/DL (ref 0–100)
TRIGL SERPL-MCNC: 196 MG/DL (ref ?–150)

## 2025-06-04 PROCEDURE — 83036 HEMOGLOBIN GLYCOSYLATED A1C: CPT

## 2025-06-04 PROCEDURE — 80061 LIPID PANEL: CPT

## 2025-06-04 PROCEDURE — 36415 COLL VENOUS BLD VENIPUNCTURE: CPT

## 2025-06-05 ENCOUNTER — OFFICE VISIT (OUTPATIENT)
Dept: FAMILY MEDICINE CLINIC | Facility: CLINIC | Age: 55
End: 2025-06-05

## 2025-06-05 VITALS
BODY MASS INDEX: 32.66 KG/M2 | DIASTOLIC BLOOD PRESSURE: 76 MMHG | OXYGEN SATURATION: 99 % | TEMPERATURE: 97.6 F | HEIGHT: 63 IN | WEIGHT: 184.3 LBS | RESPIRATION RATE: 18 BRPM | SYSTOLIC BLOOD PRESSURE: 118 MMHG | HEART RATE: 82 BPM

## 2025-06-05 DIAGNOSIS — E88.1 LIPODYSTROPHY: ICD-10-CM

## 2025-06-05 DIAGNOSIS — R73.03 PREDIABETES: ICD-10-CM

## 2025-06-05 DIAGNOSIS — I71.21 ANEURYSM OF ASCENDING AORTA WITHOUT RUPTURE (HCC): ICD-10-CM

## 2025-06-05 DIAGNOSIS — E66.811 OBESITY, CLASS I, BMI 30-34.9: ICD-10-CM

## 2025-06-05 DIAGNOSIS — I10 ESSENTIAL HYPERTENSION: Primary | ICD-10-CM

## 2025-06-05 DIAGNOSIS — E78.2 MIXED HYPERLIPIDEMIA: ICD-10-CM

## 2025-06-05 PROBLEM — J34.2 DEVIATED NASAL SEPTUM: Status: RESOLVED | Noted: 2023-02-08 | Resolved: 2025-06-05

## 2025-06-05 PROCEDURE — 99214 OFFICE O/P EST MOD 30 MIN: CPT | Performed by: FAMILY MEDICINE

## 2025-06-05 PROCEDURE — 3078F DIAST BP <80 MM HG: CPT | Performed by: FAMILY MEDICINE

## 2025-06-05 PROCEDURE — 3074F SYST BP LT 130 MM HG: CPT | Performed by: FAMILY MEDICINE

## 2025-06-05 RX ORDER — ATORVASTATIN CALCIUM 80 MG/1
80 TABLET, FILM COATED ORAL DAILY
Qty: 90 TABLET | Refills: 2 | Status: SHIPPED | OUTPATIENT
Start: 2025-06-05 | End: 2025-09-03

## 2025-06-05 RX ORDER — EZETIMIBE 10 MG/1
10 TABLET ORAL DAILY
Qty: 90 TABLET | Refills: 3 | Status: SHIPPED | OUTPATIENT
Start: 2025-06-05 | End: 2026-05-31

## 2025-06-05 RX ORDER — AMLODIPINE BESYLATE 10 MG/1
10 TABLET ORAL DAILY
Qty: 90 TABLET | Refills: 3 | Status: SHIPPED | OUTPATIENT
Start: 2025-06-05 | End: 2025-09-03

## 2025-06-05 NOTE — ASSESSMENT & PLAN NOTE
Stable  Continues optimize blood pressure control and avoid persistent strenuous activity.   Most recent CT scan showed 4.3 cm aortic aneurysm  Will need repeat CT scan for aneurysm surveillance  ED precautions provided for sudden onset chest pain    Orders:    CT chest wo contrast; Future

## 2025-06-05 NOTE — ASSESSMENT & PLAN NOTE
Hemoglobin A1c has improved significantly  Continue Zepbound  Continue low carbohydrate diet

## 2025-06-05 NOTE — ASSESSMENT & PLAN NOTE
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

## 2025-06-05 NOTE — PROGRESS NOTES
Name: Florencia Stewart      : 1970      MRN: [id number]  Encounter Provider: Rick Hua MD  Encounter Date: 2025   Encounter department: [facility]  :  Assessment & Plan  Essential hypertension  Stable  Continue amlodipine and metoprolol  Blood pressure goal is less than 130/80    Orders:    amLODIPine (NORVASC) 10 mg tablet; Take 1 tablet (10 mg total) by mouth daily     Aneurysm of ascending aorta without rupture (HCC)  Stable  Continues optimize blood pressure control and avoid persistent strenuous activity.   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  Will add Zetia  Reassess lipid panel in a few weeks  Consider cardiology evaluation  Orders:    ezetimibe (ZETIA) 10 mg tablet; Take 1 tablet (10 mg total) by mouth daily    LDL cholesterol, direct; Future    atorvastatin (LIPITOR) 80 mg tablet; Take 1 tablet (80 mg total) by mouth daily    Prediabetes  Hemoglobin A1c has improved significantly  Continue Zepbound  Continue low carbohydrate diet         Lipodystrophy    Orders:    atorvastatin (LIPITOR) 80 mg tablet; Take 1 tablet (80 mg total) by mouth daily    Obesity, Class I, BMI 30-34.9  Continue Zepbound  Follow-up with weight management              History of Present Illness   55-year-old female with a history of prediabetes, obesity, ascending aortic aneurysm, and hypertension who presents today for follow-up.  She is doing well overall.  She is losing weight from Zepbound.  She is following closely with weight management.  She reports adherence to her to her  "medications      Review of Systems   Constitutional:  Negative for appetite change, chills, diaphoresis, fatigue and fever.   Eyes:  Negative for visual disturbance.   Respiratory:  Negative for shortness of breath and wheezing.    Cardiovascular:  Negative for chest pain and palpitations.   Gastrointestinal:  Negative for abdominal distention, abdominal pain, blood in stool, nausea, rectal pain and vomiting.   Genitourinary:  Negative for dysuria, hematuria and urgency.   Musculoskeletal:  Negative for back pain.   Skin:  Negative for rash.   Neurological:  Negative for dizziness, seizures, syncope, light-headedness and headaches.   Psychiatric/Behavioral:  Negative for dysphoric mood.        Objective   /76 (BP Location: Left arm, Patient Position: Sitting, Cuff Size: Standard)   Pulse 82   Temp 97.6 °F (36.4 °C) (Temporal)   Resp 18   Ht 5' 3\" (1.6 m)   Wt 83.6 kg (184 lb 4.8 oz)   LMP 12/15/2018 (Approximate) Comment: Hx of tubal ligation  SpO2 99%   BMI 32.65 kg/m²      Physical Exam  Constitutional:       General: She is not in acute distress.     Appearance: Normal appearance. She is well-developed. She is obese. She is not ill-appearing, toxic-appearing or diaphoretic.   HENT:      Head: Normocephalic and atraumatic.      Right Ear: External ear normal.      Left Ear: External ear normal.      Nose: Nose normal.      Mouth/Throat:      Mouth: Mucous membranes are moist.     Eyes:      General: No scleral icterus.        Right eye: No discharge.         Left eye: No discharge.      Extraocular Movements: Extraocular movements intact.      Pupils: Pupils are equal, round, and reactive to light.       Cardiovascular:      Rate and Rhythm: Normal rate and regular rhythm.      Heart sounds: Normal heart sounds. No murmur heard.     No friction rub. No gallop.   Pulmonary:      Effort: Pulmonary effort is normal. No respiratory distress.      Breath sounds: Normal breath sounds. No stridor. No " wheezing or rhonchi.   Abdominal:      General: Bowel sounds are normal. There is no distension.      Palpations: Abdomen is soft. There is no mass.      Tenderness: There is no abdominal tenderness. There is no guarding or rebound.     Musculoskeletal:         General: Normal range of motion.      Cervical back: Normal range of motion.      Right lower leg: No edema.      Left lower leg: No edema.     Skin:     General: Skin is warm.      Findings: No lesion or rash.     Neurological:      General: No focal deficit present.      Mental Status: She is alert and oriented to person, place, and time.      Cranial Nerves: No cranial nerve deficit.      Sensory: No sensory deficit.      Motor: No weakness.      Gait: Gait normal.     Psychiatric:         Mood and Affect: Mood normal.         Behavior: Behavior normal.

## 2025-06-07 DIAGNOSIS — I71.21 ANEURYSM OF ASCENDING AORTA WITHOUT RUPTURE (HCC): ICD-10-CM

## 2025-06-09 RX ORDER — METOPROLOL TARTRATE 25 MG/1
TABLET, FILM COATED ORAL
Qty: 90 TABLET | Refills: 1 | Status: SHIPPED | OUTPATIENT
Start: 2025-06-09

## 2025-06-16 ENCOUNTER — HOSPITAL ENCOUNTER (OUTPATIENT)
Dept: CT IMAGING | Facility: HOSPITAL | Age: 55
Discharge: HOME | End: 2025-06-16
Attending: FAMILY MEDICINE
Payer: COMMERCIAL

## 2025-06-16 DIAGNOSIS — I71.21 ANEURYSM OF ASCENDING AORTA WITHOUT RUPTURE (HCC): ICD-10-CM

## 2025-06-16 PROCEDURE — 71250 CT THORAX DX C-: CPT

## 2025-06-19 ENCOUNTER — OFFICE VISIT (OUTPATIENT)
Dept: BARIATRICS | Facility: CLINIC | Age: 55
End: 2025-06-19
Payer: COMMERCIAL

## 2025-06-19 VITALS
SYSTOLIC BLOOD PRESSURE: 124 MMHG | HEART RATE: 91 BPM | HEIGHT: 63 IN | WEIGHT: 186.4 LBS | TEMPERATURE: 97.8 F | DIASTOLIC BLOOD PRESSURE: 80 MMHG | OXYGEN SATURATION: 96 % | BODY MASS INDEX: 33.03 KG/M2

## 2025-06-19 DIAGNOSIS — R73.03 PREDIABETES: ICD-10-CM

## 2025-06-19 DIAGNOSIS — E66.811 OBESITY, CLASS I, BMI 30-34.9: Primary | ICD-10-CM

## 2025-06-19 PROCEDURE — 99214 OFFICE O/P EST MOD 30 MIN: CPT | Performed by: PHYSICIAN ASSISTANT

## 2025-06-19 RX ORDER — TIRZEPATIDE 12.5 MG/.5ML
12.5 INJECTION, SOLUTION SUBCUTANEOUS WEEKLY
Qty: 2 ML | Refills: 1 | Status: SHIPPED | OUTPATIENT
Start: 2025-06-19

## 2025-06-19 NOTE — ASSESSMENT & PLAN NOTE
She is reporting decreased appetite and early satiety and A1C is lower, however she has not had any weight loss since starting Zepbound.  She has been off Zepbound 10mg x 2 weeks due to illness, she plans to resume right away and has 2 doses left of Zepbound 10mg.  Advised her that if she is going to be off zepbound over two weeks, dose should be reduced to lower side effects.   Will increase Zepbound to 12.5mg weekly  Advised her to contact office with update after two weeks on this dose- if tolerating, will increase to 15mg weekly.  Will refer to Bariatric Surgery for surgical evaluation since she has not had surgical eval and to follow for medical weight loss  Will also refer to refer to Bariatric Dietician.   Discussed importance of regular exercise. Recommend  working up to 150 minutes per week including full body strength training 2x per week. Strength training videos sent via [employer].

## 2025-06-19 NOTE — PROGRESS NOTES
Assessment & Plan  Obesity, Class I, BMI 30-34.9  She is reporting decreased appetite and early satiety and A1C is lower, however she has not had any weight loss since starting Zepbound.  She has been off Zepbound 10mg x 2 weeks due to illness, she plans to resume right away and has 2 doses left of Zepbound 10mg.  Advised her that if she is going to be off zepbound over two weeks, dose should be reduced to lower side effects.   Will increase Zepbound to 12.5mg weekly  Advised her to contact office with update after two weeks on this dose- if tolerating, will increase to 15mg weekly.  Will refer to Bariatric Surgery for surgical evaluation since she has not had surgical eval and to follow for medical weight loss  Will also refer to refer to Bariatric Dietician.   Discussed importance of regular exercise. Recommend  working up to 150 minutes per week including full body strength training 2x per week. Strength training videos sent via [employer].      Prediabetes  A1C  has improved.        Return for Surgical Eval (new patient/transfer), 3 month Weight follow up, and Surgical RD visit. .       Subjective:   Chief Complaint   Patient presents with   • Follow-up     MWM 3M F/U; Waist- 37 in       Patient ID: Florencia Stewart  is a 55 y.o. female with excess weight/obesity here for Weight Management Follow up Visit    HPI: Here for Medical Weight Management Follow Up Visit    -S/P sleeve gastrectomy approximately May 2019 w/dr chandler   Weight was 227lb prior to surgery. Earl was 160lb. She had started to gain weight about 2 years after surgery  Previously  on phentermine(SE headaches), wellbutrin and metformin.   Cannot take topamax due to kidney stones   Takes multivitamin and Vitamin D supplement weekly     Obesity/Excess Weight:  Current BMI: Body mass index is 33.02 kg/m².   Initial Weight: 186  Last visit Weight: 185  Current Weight: 186    Current Weight Management Plan:   Current Medication: Zepbound 10mg  "weekly   Medication Side effects:  None      Has been off zepbound 2 weeks due to Asthma/ilness, Plans to resume.   Zepbound does lower appetite/cravings  Gets full quicker, eating smaller portion    Food Recall:  Breakfast: Coffee with milk and 1 pack sugar  Lunch: Small portion of rice with chicken   Dinner: Cottage cheese/pineapple   Nto really snacking     Hydration: Water-- 5 bottlles of water    Lifestyle History:  Exercise: walking 2 miles per day, most day of the week  Stopped x 2 weeks while sick.         Wt Readings from Last 20 Encounters:   06/19/25 84.6 kg (186 lb 6.4 oz)   06/05/25 83.6 kg (184 lb 4.8 oz)   03/19/25 84.1 kg (185 lb 6.4 oz)   03/06/25 84.4 kg (186 lb)   03/05/25 84.4 kg (186 lb)   01/13/25 85.7 kg (188 lb 15 oz)   01/13/25 85.5 kg (188 lb 6.4 oz)   01/02/25 85.7 kg (189 lb)   12/05/24 85.9 kg (189 lb 4.8 oz)   10/11/24 86.8 kg (191 lb 6.4 oz)   09/24/24 90.7 kg (200 lb)   09/16/24 90.8 kg (200 lb 3.2 oz)   09/05/24 88 kg (194 lb)   02/05/24 83.5 kg (184 lb)   01/31/24 82.1 kg (181 lb)   12/05/23 84.7 kg (186 lb 12.8 oz)   11/16/23 83.9 kg (185 lb)   10/19/23 84.7 kg (186 lb 12.8 oz)   08/16/23 86.6 kg (191 lb)   08/10/23 83.9 kg (185 lb)        Review of Systems    Past Medical History[1]    Past Surgical History[2]     No Known Allergies     Objective:    /80 (BP Location: Left arm, Patient Position: Sitting, Cuff Size: Large)   Pulse 91   Temp 97.8 °F (36.6 °C) (Tympanic)   Ht 5' 3\" (1.6 m)   Wt 84.6 kg (186 lb 6.4 oz)   LMP 12/15/2018 (Approximate) Comment: Hx of tubal ligation  SpO2 96%   BMI 33.02 kg/m²     Physical Exam:  Constitutional:  she  appears well-developed and well-nourished. No distress.   HENT:   Head: Normocephalic and atraumatic.   Neck: Normal range of motion.   Pulmonary/Chest: Effort normal.   Musculoskeletal: Normal range of motion.   Neurological: she  is alert and oriented to person, place, and time.   Skin: she  is not diaphoretic.   Psychiatric: " "she  has a normal mood and affect. her  behavior is normal.        LABS:    Lab Results   Component Value Date    HGBA1C 5.7 (H) 06/04/2025      Lab Results   Component Value Date     05/11/2018    SODIUM 139 01/17/2025    K 3.9 01/17/2025     01/17/2025    CO2 26 01/17/2025    ANIONGAP 14 05/11/2018    AGAP 8 01/17/2025    BUN 21 01/17/2025    CREATININE 0.95 01/17/2025    GLUC 108 01/17/2025    GLUF 104 (H) 09/24/2024    CALCIUM 10.1 01/17/2025    AST 21 01/17/2025    ALT 25 01/17/2025    ALKPHOS 112 (H) 01/17/2025    TP 8.6 (H) 01/17/2025    TBILI 0.62 01/17/2025    EGFR 68 01/17/2025 06/04/2025   Lab Results   Component Value Date    YZK7XTJPWEPW 2.590 09/27/2021             [1]  Past Medical History:  Diagnosis Date   • Anemia     4/1/22 Pt reports hx of anemia - no current issues at this time    • Anesthesia     4/1/22 Pt reports her mother had reaction to anesthesia - reports her mother \"didnt wake up for two weeks\"    • Aortic aneurysm (HCC)    • Breast cancer (HCC) 01/20/2017    left side   • Depression     4/1/22 Hx of depression - resolved per pt at this time    • Diabetes mellitus (HCC)    • GERD (gastroesophageal reflux disease)    • Helicobacter pylori gastritis 05/23/2023   • History of chemotherapy 2017    chest radiation   • History of COVID-19     4/1/22 Pt reports COVID in 2021   • Hyperlipidemia    • Hypertension    • Left wrist pain 07/22/2019   • Migraine    • Obesity     Post medical management with Phentermine/topiramate    • Prediabetes    • Sleep apnea     Hx of sleep apnea - using CPAP in the past but since gastric surgery no further issues    • Tendinitis     4/1/22 Pt reports right hand tendinitis    [2]  Past Surgical History:  Procedure Laterality Date   • ABDOMINOPLASTY N/A 04/06/2022    Procedure: ABDOMINOPLASTY;  Surgeon: Blade Whitfield MD;  Location:  MAIN OR;  Service: Plastics   • BREAST BIOPSY Left 12/06/2016    stereo   • BREAST LUMPECTOMY W/ NEEDLE " LOCALIZATION Left 01/20/2017    RADIATION,HORMONE THERAPY  (DCIS LEFT BREAST)   • COLONOSCOPY     • GASTRECTOMY SLEEVE LAPAROSCOPIC     • LIPOSUCTION  12/2023   • VA EXC EXCSV SKN ABD INFRAUMBILICAL PANNICULECTOMY N/A 04/06/2022    Procedure: LIPECTOMY;  Surgeon: Blade Whitfield MD;  Location:  MAIN OR;  Service: Plastics   • VA MASTOPEXY Bilateral 10/06/2022    Procedure: MASTOPEXY BREAST;  Surgeon: Blade Whitfield MD;  Location:  MAIN OR;  Service: Plastics   • REDUCTION MAMMAPLASTY     • TUBAL LIGATION     • WRIST SURGERY Right

## 2025-06-24 ENCOUNTER — DOCUMENTATION (OUTPATIENT)
Dept: ADMINISTRATIVE | Facility: OTHER | Age: 55
End: 2025-06-24

## 2025-06-24 NOTE — PROGRESS NOTES
06/24/25 1:43 PM     Chart reviewed for Diabetic Eye Exam ; nothing is submitted to the patient's insurance at this time.     Ilana Farias MA   [facility]

## 2025-07-11 DIAGNOSIS — E66.811 OBESITY, CLASS I, BMI 30-34.9: ICD-10-CM

## 2025-07-11 NOTE — TELEPHONE ENCOUNTER
Reason for call:   [x] Refill   [] Prior Auth  [x] Other: Not a Duplicate - Medication put back    Office:   [] PCP/Provider -   [x] Specialty/Provider - [facility] - Justice Shepherd PA-C     Medication:  tirzepatide (Zepbound) 12.5 mg/0.5 mL auto-injector    Dose/Frequency:  Inject 0.5 mL (12.5 mg total) under the skin once a week     Quantity: 2 mL    Pharmacy: [id number] - [place] - [address] [phone]    Local Pharmacy   Does the patient have enough for 3 days?   [] Yes   [x] No - Send as HP to POD    Mail Away Pharmacy   Does the patient have enough for 10 days?   [] Yes   [] No - Send as HP to POD

## 2025-07-14 RX ORDER — TIRZEPATIDE 12.5 MG/.5ML
12.5 INJECTION, SOLUTION SUBCUTANEOUS WEEKLY
Qty: 2 ML | Refills: 1 | Status: SHIPPED | OUTPATIENT
Start: 2025-07-14

## 2025-07-23 ENCOUNTER — APPOINTMENT (OUTPATIENT)
Dept: LAB | Facility: HOSPITAL | Age: 55
End: 2025-07-23
Payer: COMMERCIAL

## 2025-07-23 DIAGNOSIS — E78.2 MIXED HYPERLIPIDEMIA: ICD-10-CM

## 2025-07-23 LAB — LDLC SERPL DIRECT ASSAY-MCNC: 95 MG/DL (ref 0–100)

## 2025-07-23 PROCEDURE — 83721 ASSAY OF BLOOD LIPOPROTEIN: CPT

## 2025-07-23 PROCEDURE — 36415 COLL VENOUS BLD VENIPUNCTURE: CPT

## 2025-07-23 NOTE — TELEPHONE ENCOUNTER
Copied from CRM #[id number]. Topic: General Inquiry - Patient Advice  >> Jul 23, 2025  9:11 AM Aubree wrote:  Type:  Sooner Apoointment Request    Caller is requesting a sooner appointment.  Caller declined first available appointment listed below.  Caller will not accept being placed on the waitlist and is requesting a message be sent to doctor.  Name of Caller:pt  When is the first available appointment?9/4  Would the patient rather a call back or a response via [facility]? Call back  Best Call Back Number:[phone]   Additional Information: pt would to see if he can be seen before 7/28. He has paperwork to filled out and he needs to speak to the dr.  Please call to advise  Thanks   Reason for call:   [x] Refill   [] Prior Auth  [] Other: not duplicate request- pharmacy states they did not receive script please resend     Office:   [] PCP/Provider -   [x] Specialty/Provider -  Nely Schwartz PA-C     Medication:     tirzepatide (Zepbound) 5 mg/0.5 mL auto-injector       Dose/Frequency: Inject 0.5 mL (5 mg total) under the skin once a week,     Quantity: 2mL    Pharmacy: [id number] - [place] - [address]     Does the patient have enough for 3 days?   [] Yes   [x] No - Send as HP to POD

## 2025-07-24 ENCOUNTER — VBI (OUTPATIENT)
Dept: ADMINISTRATIVE | Facility: OTHER | Age: 55
End: 2025-07-24

## 2025-07-24 NOTE — TELEPHONE ENCOUNTER
07/24/25 1:02 PM     Chart reviewed for Hemoglobin A1c was/were submitted to the patient's insurance.     Alirio Buchanan MA   [facility]

## 2025-08-04 ENCOUNTER — OFFICE VISIT (OUTPATIENT)
Dept: FAMILY MEDICINE CLINIC | Facility: CLINIC | Age: 55
End: 2025-08-04

## 2025-08-04 VITALS
HEART RATE: 68 BPM | RESPIRATION RATE: 18 BRPM | TEMPERATURE: 97.6 F | SYSTOLIC BLOOD PRESSURE: 116 MMHG | BODY MASS INDEX: 31.82 KG/M2 | DIASTOLIC BLOOD PRESSURE: 66 MMHG | OXYGEN SATURATION: 97 % | WEIGHT: 179.6 LBS | HEIGHT: 63 IN

## 2025-08-04 DIAGNOSIS — I71.21 ANEURYSM OF ASCENDING AORTA WITHOUT RUPTURE (HCC): ICD-10-CM

## 2025-08-04 DIAGNOSIS — E78.2 MIXED HYPERLIPIDEMIA: ICD-10-CM

## 2025-08-04 DIAGNOSIS — E66.811 OBESITY, CLASS I, BMI 30-34.9: ICD-10-CM

## 2025-08-04 DIAGNOSIS — F32.89 OTHER DEPRESSION: ICD-10-CM

## 2025-08-04 DIAGNOSIS — I10 ESSENTIAL HYPERTENSION: Primary | ICD-10-CM

## 2025-08-04 DIAGNOSIS — R73.03 PREDIABETES: ICD-10-CM

## 2025-08-04 DIAGNOSIS — E88.1 LIPODYSTROPHY: ICD-10-CM

## 2025-08-04 PROBLEM — H81.13 BENIGN PAROXYSMAL POSITIONAL VERTIGO DUE TO BILATERAL VESTIBULAR DISORDER: Status: RESOLVED | Noted: 2021-07-28 | Resolved: 2025-08-04

## 2025-08-04 PROBLEM — Z00.00 ANNUAL PHYSICAL EXAM: Status: RESOLVED | Noted: 2019-10-09 | Resolved: 2025-08-04

## 2025-08-04 PROCEDURE — 3078F DIAST BP <80 MM HG: CPT | Performed by: FAMILY MEDICINE

## 2025-08-04 PROCEDURE — 3074F SYST BP LT 130 MM HG: CPT | Performed by: FAMILY MEDICINE

## 2025-08-04 PROCEDURE — 99214 OFFICE O/P EST MOD 30 MIN: CPT | Performed by: FAMILY MEDICINE

## 2025-08-04 RX ORDER — BUPROPION HYDROCHLORIDE 150 MG/1
150 TABLET ORAL 2 TIMES DAILY
Qty: 60 TABLET | Refills: 5 | Status: SHIPPED | OUTPATIENT
Start: 2025-08-04 | End: 2025-08-04

## 2025-08-04 RX ORDER — BUPROPION HYDROCHLORIDE 150 MG/1
150 TABLET, EXTENDED RELEASE ORAL 2 TIMES DAILY
Qty: 60 TABLET | Refills: 1 | Status: SHIPPED | OUTPATIENT
Start: 2025-08-04